# Patient Record
Sex: MALE | URBAN - METROPOLITAN AREA
[De-identification: names, ages, dates, MRNs, and addresses within clinical notes are randomized per-mention and may not be internally consistent; named-entity substitution may affect disease eponyms.]

---

## 2023-08-03 PROBLEM — N20.0 KIDNEY STONES: Status: ACTIVE | Noted: 2023-08-03

## 2023-08-03 PROBLEM — Z87.81: Status: ACTIVE | Noted: 2023-08-03

## 2023-08-03 PROBLEM — R19.7 DIARRHEA OF PRESUMED INFECTIOUS ORIGIN: Status: ACTIVE | Noted: 2023-08-03

## 2023-08-03 PROBLEM — R53.81 PHYSICAL DEBILITY: Status: ACTIVE | Noted: 2023-08-03

## 2023-08-03 PROBLEM — R63.0 ANOREXIA: Status: ACTIVE | Noted: 2023-08-03

## 2023-08-03 PROBLEM — F03.90 DEMENTIA WITHOUT BEHAVIORAL DISTURBANCE (HCC): Status: ACTIVE | Noted: 2023-08-03

## 2023-08-03 PROBLEM — R33.9 URINARY RETENTION: Status: ACTIVE | Noted: 2023-08-03

## 2023-08-11 ENCOUNTER — HOSPITAL ENCOUNTER (OUTPATIENT)
Facility: MEDICAL CENTER | Age: 88
End: 2023-08-11
Attending: NURSE PRACTITIONER
Payer: MEDICARE

## 2023-08-11 LAB
G LAMBLIA+C PARVUM AG STL QL RAPID: NORMAL
SIGNIFICANT IND 70042: NORMAL
SITE SITE: NORMAL
SOURCE SOURCE: NORMAL

## 2023-08-11 PROCEDURE — 87328 CRYPTOSPORIDIUM AG IA: CPT

## 2023-09-17 ENCOUNTER — HOSPITAL ENCOUNTER (OUTPATIENT)
Facility: MEDICAL CENTER | Age: 88
End: 2023-09-17
Attending: INTERNAL MEDICINE
Payer: COMMERCIAL

## 2023-09-19 ENCOUNTER — HOSPITAL ENCOUNTER (INPATIENT)
Facility: MEDICAL CENTER | Age: 88
LOS: 8 days | DRG: 871 | End: 2023-09-27
Attending: EMERGENCY MEDICINE | Admitting: INTERNAL MEDICINE
Payer: MEDICARE

## 2023-09-19 ENCOUNTER — APPOINTMENT (OUTPATIENT)
Dept: RADIOLOGY | Facility: MEDICAL CENTER | Age: 88
DRG: 871 | End: 2023-09-19
Attending: EMERGENCY MEDICINE
Payer: MEDICARE

## 2023-09-19 DIAGNOSIS — A41.9 SEPSIS, DUE TO UNSPECIFIED ORGANISM, UNSPECIFIED WHETHER ACUTE ORGAN DYSFUNCTION PRESENT (HCC): ICD-10-CM

## 2023-09-19 DIAGNOSIS — N17.9 SEPSIS WITH ACUTE RENAL FAILURE, DUE TO UNSPECIFIED ORGANISM, UNSPECIFIED ACUTE RENAL FAILURE TYPE, UNSPECIFIED WHETHER SEPTIC SHOCK PRESENT (HCC): ICD-10-CM

## 2023-09-19 DIAGNOSIS — A41.9 SEPSIS WITH ACUTE RENAL FAILURE, DUE TO UNSPECIFIED ORGANISM, UNSPECIFIED ACUTE RENAL FAILURE TYPE, UNSPECIFIED WHETHER SEPTIC SHOCK PRESENT (HCC): ICD-10-CM

## 2023-09-19 DIAGNOSIS — F03.90 DEMENTIA, UNSPECIFIED DEMENTIA SEVERITY, UNSPECIFIED DEMENTIA TYPE, UNSPECIFIED WHETHER BEHAVIORAL, PSYCHOTIC, OR MOOD DISTURBANCE OR ANXIETY (HCC): ICD-10-CM

## 2023-09-19 DIAGNOSIS — N39.0 URINARY TRACT INFECTION WITHOUT HEMATURIA, SITE UNSPECIFIED: ICD-10-CM

## 2023-09-19 DIAGNOSIS — R65.20 SEPSIS WITH ACUTE RENAL FAILURE, DUE TO UNSPECIFIED ORGANISM, UNSPECIFIED ACUTE RENAL FAILURE TYPE, UNSPECIFIED WHETHER SEPTIC SHOCK PRESENT (HCC): ICD-10-CM

## 2023-09-19 DIAGNOSIS — I95.89 OTHER SPECIFIED HYPOTENSION: ICD-10-CM

## 2023-09-19 PROBLEM — N28.9 RENAL INSUFFICIENCY: Status: ACTIVE | Noted: 2023-09-19

## 2023-09-19 PROBLEM — B96.89 UTI DUE TO KLEBSIELLA SPECIES: Status: ACTIVE | Noted: 2023-09-19

## 2023-09-19 PROBLEM — D69.6 THROMBOCYTOPENIA (HCC): Status: ACTIVE | Noted: 2023-09-19

## 2023-09-19 PROBLEM — D64.9 ANEMIA: Status: ACTIVE | Noted: 2023-09-19

## 2023-09-19 LAB
ALBUMIN SERPL BCP-MCNC: 2.1 G/DL (ref 3.2–4.9)
ALBUMIN/GLOB SERPL: 0.8 G/DL
ALP SERPL-CCNC: 147 U/L (ref 30–99)
ALT SERPL-CCNC: 13 U/L (ref 2–50)
AMORPH CRY #/AREA URNS HPF: PRESENT /HPF
ANION GAP SERPL CALC-SCNC: 13 MMOL/L (ref 7–16)
APPEARANCE UR: ABNORMAL
AST SERPL-CCNC: 22 U/L (ref 12–45)
BACTERIA #/AREA URNS HPF: ABNORMAL /HPF
BASOPHILS # BLD AUTO: 0 % (ref 0–1.8)
BASOPHILS # BLD: 0 K/UL (ref 0–0.12)
BILIRUB SERPL-MCNC: 0.7 MG/DL (ref 0.1–1.5)
BILIRUB UR QL STRIP.AUTO: NEGATIVE
BUN SERPL-MCNC: 40 MG/DL (ref 8–22)
BURR CELLS BLD QL SMEAR: NORMAL
CALCIUM ALBUM COR SERPL-MCNC: 8.9 MG/DL (ref 8.5–10.5)
CALCIUM SERPL-MCNC: 7.4 MG/DL (ref 8.5–10.5)
CHLORIDE SERPL-SCNC: 108 MMOL/L (ref 96–112)
CO2 SERPL-SCNC: 17 MMOL/L (ref 20–33)
COLOR UR: YELLOW
CORTIS SERPL-MCNC: 22 UG/DL (ref 0–23)
CREAT SERPL-MCNC: 1.74 MG/DL (ref 0.5–1.4)
EKG IMPRESSION: NORMAL
EOSINOPHIL # BLD AUTO: 0 K/UL (ref 0–0.51)
EOSINOPHIL NFR BLD: 0 % (ref 0–6.9)
EPI CELLS #/AREA URNS HPF: NEGATIVE /HPF
ERYTHROCYTE [DISTWIDTH] IN BLOOD BY AUTOMATED COUNT: 63.7 FL (ref 35.9–50)
GFR SERPLBLD CREATININE-BSD FMLA CKD-EPI: 37 ML/MIN/1.73 M 2
GLOBULIN SER CALC-MCNC: 2.5 G/DL (ref 1.9–3.5)
GLUCOSE SERPL-MCNC: 98 MG/DL (ref 65–99)
GLUCOSE UR STRIP.AUTO-MCNC: NEGATIVE MG/DL
HCT VFR BLD AUTO: 29.3 % (ref 42–52)
HGB BLD-MCNC: 9.3 G/DL (ref 14–18)
HYALINE CASTS #/AREA URNS LPF: ABNORMAL /LPF
INR PPP: 1.46 (ref 0.87–1.13)
KETONES UR STRIP.AUTO-MCNC: NEGATIVE MG/DL
LACTATE SERPL-SCNC: 1.6 MMOL/L (ref 0.5–2)
LEUKOCYTE ESTERASE UR QL STRIP.AUTO: ABNORMAL
LYMPHOCYTES # BLD AUTO: 0.15 K/UL (ref 1–4.8)
LYMPHOCYTES NFR BLD: 0.9 % (ref 22–41)
MANUAL DIFF BLD: NORMAL
MCH RBC QN AUTO: 29 PG (ref 27–33)
MCHC RBC AUTO-ENTMCNC: 31.7 G/DL (ref 32.3–36.5)
MCV RBC AUTO: 91.3 FL (ref 81.4–97.8)
METAMYELOCYTES NFR BLD MANUAL: 0.8 %
MICRO URNS: ABNORMAL
MONOCYTES # BLD AUTO: 0.28 K/UL (ref 0–0.85)
MONOCYTES NFR BLD AUTO: 1.7 % (ref 0–13.4)
MORPHOLOGY BLD-IMP: NORMAL
MUCOUS THREADS #/AREA URNS HPF: ABNORMAL /HPF
NEUTROPHILS # BLD AUTO: 15.94 K/UL (ref 1.82–7.42)
NEUTROPHILS NFR BLD: 95.8 % (ref 44–72)
NEUTS BAND NFR BLD MANUAL: 0.8 % (ref 0–10)
NITRITE UR QL STRIP.AUTO: POSITIVE
NRBC # BLD AUTO: 0 K/UL
NRBC BLD-RTO: 0 /100 WBC (ref 0–0.2)
OVALOCYTES BLD QL SMEAR: NORMAL
PH UR STRIP.AUTO: 6 [PH] (ref 5–8)
PLATELET # BLD AUTO: 114 K/UL (ref 164–446)
PLATELET BLD QL SMEAR: NORMAL
PMV BLD AUTO: 11.4 FL (ref 9–12.9)
POIKILOCYTOSIS BLD QL SMEAR: NORMAL
POTASSIUM SERPL-SCNC: 3.6 MMOL/L (ref 3.6–5.5)
PROT SERPL-MCNC: 4.6 G/DL (ref 6–8.2)
PROT UR QL STRIP: 100 MG/DL
PROTHROMBIN TIME: 17.9 SEC (ref 12–14.6)
RBC # BLD AUTO: 3.21 M/UL (ref 4.7–6.1)
RBC # URNS HPF: ABNORMAL /HPF
RBC BLD AUTO: PRESENT
RBC UR QL AUTO: ABNORMAL
RENAL EPI CELLS #/AREA URNS HPF: ABNORMAL /HPF
SODIUM SERPL-SCNC: 138 MMOL/L (ref 135–145)
SP GR UR STRIP.AUTO: 1.01
TRANS CELLS #/AREA URNS HPF: ABNORMAL /HPF
UROBILINOGEN UR STRIP.AUTO-MCNC: NORMAL MG/DL
WBC # BLD AUTO: 16.5 K/UL (ref 4.8–10.8)
WBC #/AREA URNS HPF: ABNORMAL /HPF

## 2023-09-19 PROCEDURE — 96374 THER/PROPH/DIAG INJ IV PUSH: CPT

## 2023-09-19 PROCEDURE — 87015 SPECIMEN INFECT AGNT CONCNTJ: CPT

## 2023-09-19 PROCEDURE — 87077 CULTURE AEROBIC IDENTIFY: CPT | Mod: 91

## 2023-09-19 PROCEDURE — 36415 COLL VENOUS BLD VENIPUNCTURE: CPT

## 2023-09-19 PROCEDURE — 71045 X-RAY EXAM CHEST 1 VIEW: CPT

## 2023-09-19 PROCEDURE — 303105 HCHG CATHETER EXTRA

## 2023-09-19 PROCEDURE — 81001 URINALYSIS AUTO W/SCOPE: CPT

## 2023-09-19 PROCEDURE — 99285 EMERGENCY DEPT VISIT HI MDM: CPT

## 2023-09-19 PROCEDURE — 87040 BLOOD CULTURE FOR BACTERIA: CPT

## 2023-09-19 PROCEDURE — 83605 ASSAY OF LACTIC ACID: CPT

## 2023-09-19 PROCEDURE — 700105 HCHG RX REV CODE 258: Performed by: EMERGENCY MEDICINE

## 2023-09-19 PROCEDURE — 85007 BL SMEAR W/DIFF WBC COUNT: CPT

## 2023-09-19 PROCEDURE — 85025 COMPLETE CBC W/AUTO DIFF WBC: CPT

## 2023-09-19 PROCEDURE — 51702 INSERT TEMP BLADDER CATH: CPT

## 2023-09-19 PROCEDURE — 770020 HCHG ROOM/CARE - TELE (206)

## 2023-09-19 PROCEDURE — 74018 RADEX ABDOMEN 1 VIEW: CPT

## 2023-09-19 PROCEDURE — 87106 FUNGI IDENTIFICATION YEAST: CPT

## 2023-09-19 PROCEDURE — 87186 SC STD MICRODIL/AGAR DIL: CPT | Mod: 91

## 2023-09-19 PROCEDURE — 99222 1ST HOSP IP/OBS MODERATE 55: CPT | Mod: AI | Performed by: HOSPITALIST

## 2023-09-19 PROCEDURE — 87086 URINE CULTURE/COLONY COUNT: CPT

## 2023-09-19 PROCEDURE — 85610 PROTHROMBIN TIME: CPT

## 2023-09-19 PROCEDURE — 700105 HCHG RX REV CODE 258: Performed by: HOSPITALIST

## 2023-09-19 PROCEDURE — 93005 ELECTROCARDIOGRAM TRACING: CPT

## 2023-09-19 PROCEDURE — 700111 HCHG RX REV CODE 636 W/ 250 OVERRIDE (IP): Mod: JZ | Performed by: EMERGENCY MEDICINE

## 2023-09-19 PROCEDURE — 82533 TOTAL CORTISOL: CPT

## 2023-09-19 PROCEDURE — 80053 COMPREHEN METABOLIC PANEL: CPT

## 2023-09-19 RX ORDER — MEMANTINE HYDROCHLORIDE 10 MG/1
5 TABLET ORAL DAILY
Status: DISCONTINUED | OUTPATIENT
Start: 2023-09-20 | End: 2023-09-27 | Stop reason: HOSPADM

## 2023-09-19 RX ORDER — AMOXICILLIN 250 MG
2 CAPSULE ORAL 2 TIMES DAILY
Status: DISCONTINUED | OUTPATIENT
Start: 2023-09-19 | End: 2023-09-27 | Stop reason: HOSPADM

## 2023-09-19 RX ORDER — OMEPRAZOLE 20 MG/1
20 CAPSULE, DELAYED RELEASE ORAL DAILY
Status: DISCONTINUED | OUTPATIENT
Start: 2023-09-20 | End: 2023-09-27 | Stop reason: HOSPADM

## 2023-09-19 RX ORDER — ONDANSETRON 4 MG/1
4 TABLET, ORALLY DISINTEGRATING ORAL EVERY 4 HOURS PRN
Status: DISCONTINUED | OUTPATIENT
Start: 2023-09-19 | End: 2023-09-27 | Stop reason: HOSPADM

## 2023-09-19 RX ORDER — OXYBUTYNIN CHLORIDE 5 MG/1
5 TABLET, EXTENDED RELEASE ORAL DAILY
COMMUNITY

## 2023-09-19 RX ORDER — LABETALOL HYDROCHLORIDE 5 MG/ML
10 INJECTION, SOLUTION INTRAVENOUS EVERY 4 HOURS PRN
Status: DISCONTINUED | OUTPATIENT
Start: 2023-09-19 | End: 2023-09-27 | Stop reason: HOSPADM

## 2023-09-19 RX ORDER — TAMSULOSIN HYDROCHLORIDE 0.4 MG/1
0.4 CAPSULE ORAL EVERY EVENING
Status: DISCONTINUED | OUTPATIENT
Start: 2023-09-20 | End: 2023-09-27 | Stop reason: HOSPADM

## 2023-09-19 RX ORDER — SODIUM CHLORIDE 9 MG/ML
INJECTION, SOLUTION INTRAVENOUS CONTINUOUS
Status: ON HOLD | COMMUNITY
Start: 2023-09-17 | End: 2023-09-27

## 2023-09-19 RX ORDER — CEFTRIAXONE 2 G/1
2000 INJECTION, POWDER, FOR SOLUTION INTRAMUSCULAR; INTRAVENOUS ONCE
Status: DISCONTINUED | OUTPATIENT
Start: 2023-09-19 | End: 2023-09-19

## 2023-09-19 RX ORDER — TERAZOSIN 5 MG/1
5 CAPSULE ORAL NIGHTLY
COMMUNITY
End: 2023-10-17

## 2023-09-19 RX ORDER — BISACODYL 10 MG
10 SUPPOSITORY, RECTAL RECTAL
Status: DISCONTINUED | OUTPATIENT
Start: 2023-09-19 | End: 2023-09-27 | Stop reason: HOSPADM

## 2023-09-19 RX ORDER — GUAIFENESIN 200 MG/10ML
10 LIQUID ORAL EVERY 4 HOURS PRN
COMMUNITY
End: 2023-10-17

## 2023-09-19 RX ORDER — SODIUM CHLORIDE, SODIUM LACTATE, POTASSIUM CHLORIDE, CALCIUM CHLORIDE 600; 310; 30; 20 MG/100ML; MG/100ML; MG/100ML; MG/100ML
1000 INJECTION, SOLUTION INTRAVENOUS ONCE
Status: COMPLETED | OUTPATIENT
Start: 2023-09-19 | End: 2023-09-19

## 2023-09-19 RX ORDER — LANOLIN ALCOHOL/MO/W.PET/CERES
3 CREAM (GRAM) TOPICAL
COMMUNITY

## 2023-09-19 RX ORDER — SODIUM CHLORIDE, SODIUM LACTATE, POTASSIUM CHLORIDE, AND CALCIUM CHLORIDE .6; .31; .03; .02 G/100ML; G/100ML; G/100ML; G/100ML
500 INJECTION, SOLUTION INTRAVENOUS
Status: COMPLETED | OUTPATIENT
Start: 2023-09-19 | End: 2023-09-19

## 2023-09-19 RX ORDER — TERAZOSIN 5 MG/1
5 CAPSULE ORAL DAILY
Status: ON HOLD | COMMUNITY
End: 2023-09-27

## 2023-09-19 RX ORDER — ONDANSETRON 2 MG/ML
4 INJECTION INTRAMUSCULAR; INTRAVENOUS EVERY 4 HOURS PRN
Status: DISCONTINUED | OUTPATIENT
Start: 2023-09-19 | End: 2023-09-27 | Stop reason: HOSPADM

## 2023-09-19 RX ORDER — POLYETHYLENE GLYCOL 3350 17 G/17G
1 POWDER, FOR SOLUTION ORAL
Status: DISCONTINUED | OUTPATIENT
Start: 2023-09-19 | End: 2023-09-27 | Stop reason: HOSPADM

## 2023-09-19 RX ORDER — OMEPRAZOLE 20 MG/1
20 CAPSULE, DELAYED RELEASE ORAL DAILY
COMMUNITY

## 2023-09-19 RX ORDER — MEMANTINE HYDROCHLORIDE 5 MG/1
5 TABLET ORAL DAILY
COMMUNITY

## 2023-09-19 RX ORDER — SODIUM CHLORIDE, SODIUM LACTATE, POTASSIUM CHLORIDE, CALCIUM CHLORIDE 600; 310; 30; 20 MG/100ML; MG/100ML; MG/100ML; MG/100ML
INJECTION, SOLUTION INTRAVENOUS CONTINUOUS
Status: DISCONTINUED | OUTPATIENT
Start: 2023-09-19 | End: 2023-09-20

## 2023-09-19 RX ORDER — AMOXICILLIN 250 MG
1 CAPSULE ORAL 2 TIMES DAILY
COMMUNITY

## 2023-09-19 RX ORDER — TAMSULOSIN HYDROCHLORIDE 0.4 MG/1
0.4 CAPSULE ORAL EVERY EVENING
COMMUNITY

## 2023-09-19 RX ORDER — POTASSIUM CHLORIDE 750 MG/1
10 TABLET, EXTENDED RELEASE ORAL DAILY
COMMUNITY

## 2023-09-19 RX ORDER — CEFTRIAXONE 2 G/1
2000 INJECTION, POWDER, FOR SOLUTION INTRAMUSCULAR; INTRAVENOUS ONCE
Status: COMPLETED | OUTPATIENT
Start: 2023-09-19 | End: 2023-09-19

## 2023-09-19 RX ORDER — OXYBUTYNIN CHLORIDE 5 MG/1
5 TABLET, EXTENDED RELEASE ORAL DAILY
Status: DISCONTINUED | OUTPATIENT
Start: 2023-09-20 | End: 2023-09-27 | Stop reason: HOSPADM

## 2023-09-19 RX ORDER — ENOXAPARIN SODIUM 100 MG/ML
40 INJECTION SUBCUTANEOUS DAILY
Status: DISCONTINUED | OUTPATIENT
Start: 2023-09-20 | End: 2023-09-20

## 2023-09-19 RX ADMIN — SODIUM CHLORIDE, POTASSIUM CHLORIDE, SODIUM LACTATE AND CALCIUM CHLORIDE 1000 ML: 600; 310; 30; 20 INJECTION, SOLUTION INTRAVENOUS at 11:47

## 2023-09-19 RX ADMIN — SODIUM CHLORIDE, POTASSIUM CHLORIDE, SODIUM LACTATE AND CALCIUM CHLORIDE 500 ML: 600; 310; 30; 20 INJECTION, SOLUTION INTRAVENOUS at 19:11

## 2023-09-19 RX ADMIN — SODIUM CHLORIDE, POTASSIUM CHLORIDE, SODIUM LACTATE AND CALCIUM CHLORIDE: 600; 310; 30; 20 INJECTION, SOLUTION INTRAVENOUS at 22:49

## 2023-09-19 RX ADMIN — CEFTRIAXONE SODIUM 2000 MG: 2 INJECTION, POWDER, FOR SOLUTION INTRAMUSCULAR; INTRAVENOUS at 12:49

## 2023-09-19 ASSESSMENT — ENCOUNTER SYMPTOMS
FEVER: 0
SHORTNESS OF BREATH: 0
ABDOMINAL PAIN: 0
DIZZINESS: 0
NAUSEA: 0
NERVOUS/ANXIOUS: 0
BACK PAIN: 0

## 2023-09-19 NOTE — ED PROVIDER NOTES
"CHIEF COMPLAINT  Chief Complaint   Patient presents with    Hypotension     Pt BIBA from [facility]. Pt noted to be hypotensive, systolic in the 80s.  Pt received 2L NS from [facility] facility and sent here. Pt denies dizziness at this time.     Abnormal Labs     Pt has blood work performed at [facility] last night and results came back with potassium of 2.8 and WBC count of 17.  Pt received 60mcg potassium replacement at the [facility].        LIMITATION TO HISTORY   Select: The patient has a history of dementia    HPI    Trev Carl is a 88 y.o. male with a history of dementia who presents to the Emergency Department for hypotension and abnormal labs onset prior to arrival. The patient states \"all of a sudden I was on the floor\". He reports he does not know where he was when this happened. He states he knows he is in the ED, but thinks he is in [facility] and does not know what city he is in. The patient states he has pain in his little finger, but denies any other pain or shortness of breath. Per nursing, the patient was noted to have a systolic blood pressure in the 80's at an outlying facility. He was given 2L NS from [facility] facility and was sent here. He also had blood work performed last night and the results came back with potassium of 2.8 and WBC of 17. He received 60mcg potassium replacement at the [facility].    OUTSIDE HISTORIAN(S):  Select: Nursing staff    EXTERNAL RECORDS REVIEWED  Select: Other   Urinary retention  The patient was admitted to the hospital  on 7- with obstructive acute renal failure with ureteral lithiasis and evidence of urinary tract  infection. He underwent urology consultation and subsequently double-J stenting on July 28. The bladder was noted to have  significant trabeculation and oxybutynin was initiated. Urology has requested the [facility] to remain in place until outpatient followup  with voiding trial in approximately 1 week.  Status post fracture of hip  Status post discharge " from skilled nursing facility.  Has home health ordered.  Denies pain on exam.     Physical debility  Status post recent discharge from skilled nursing facility.  Has home health.     Kidney stones  The patient was admitted to the hospital  on 7- with obstructive acute renal failure with ureteral lithiasis and evidence of urinary tract  infection. He underwent urology consultation and subsequently double-J stenting on July 28. The bladder was noted to have  significant trabeculation and oxybutynin was initiated. Urology has requested the [facility] to remain in place until outpatient followup  with voiding trial in approximately 1 week.  Diarrhea of presumed infectious origin  Patient is having constant liquid stools.  No foul odor reported.  To have a ER assess.     Dementia without behavioral disturbance (HCC)  Patient is oriented to person and situation.  Pleasant and cooperative.  Staff reports patient has been pleasant and cooperative.  No problems with mood or behavior.  Continue 24/7 care and group home setting with assist with all ADLs and medication administration.  Reassess as needed for changes in status      PAST MEDICAL HISTORY  Past Medical History:   Diagnosis Date    Hearing loss      .    SURGICAL HISTORY  History reviewed. No pertinent surgical history.  Patient recently had bilateral ureteral stents placed because of obstructive kidney stones and urosepsis    FAMILY HISTORY  No family history noted.   Unable to provide much of a history for this.    SOCIAL HISTORY  Social History     Socioeconomic History    Marital status:      Spouse name: Not on file    Number of children: Not on file    Years of education: Not on file    Highest education level: Not on file   Occupational History    Not on file   Tobacco Use    Smoking status: Not on file    Smokeless tobacco: Not on file   Substance and Sexual Activity    Alcohol use: Not on file    Drug use: Not on file    Sexual activity: Not on  "file   Other Topics Concern    Not on file   Social History Narrative    Not on file     Social Determinants of Health     Financial Resource Strain: Not on file   Food Insecurity: Not on file   Transportation Needs: Not on file   Physical Activity: Not on file   Stress: Not on file   Social Connections: Not on file   Intimate Partner Violence: Not on file   Housing Stability: Not on file         CURRENT MEDICATIONS  No current facility-administered medications on file prior to encounter.     Current Outpatient Medications on File Prior to Encounter   Medication Sig Dispense Refill    melatonin 3 MG Tab Take 3 mg by mouth at bedtime.      memantine (NAMENDA) 5 MG Tab Take 5 mg by mouth every day.      omeprazole (PRILOSEC) 20 MG delayed-release capsule Take 20 mg by mouth every day.      oxybutynin SR (DITROPAN-XL) 5 MG TABLET SR 24 HR Take 5 mg by mouth every day.      potassium chloride SA (KDUR) 20 MEQ Tab CR Take 60 mEq by mouth every day. 20 meq x 3 tablets = 60 meq      tamsulosin (FLOMAX) 0.4 MG capsule Take 0.4 mg by mouth every evening.      terazosin (HYTRIN) 5 MG Cap Take 5 mg by mouth every evening.      terazosin (HYTRIN) 5 MG Cap Take 5 mg by mouth every day.      senna-docusate (PERICOLACE OR SENOKOT S) 8.6-50 MG Tab Take 1 Tablet by mouth 2 times a day.      Sodium Chloride (NS) Solution Infuse  into a venous catheter continuous.      nystatin (MYCOSTATIN) 469195 UNIT/ML Suspension Take 500,000 Units by mouth 4 times a day.      guaiFENesin (ROBITUSSIN) 100 MG/5ML liquid Take 10 mL by mouth every four hours as needed for Cough.             ALLERGIES  No Known Allergies    PHYSICAL EXAM  VITAL SIGNS:BP 92/53   Pulse 87   Temp 35.9 °C (96.6 °F) (Temporal)   Resp 20   Ht 1.778 m (5' 10\")   Wt 90.7 kg (200 lb)   SpO2 96%   BMI 28.70 kg/m²     Constitutional: Elderly in appearance, confused, no acute ditress  HENT: Normocephalic, Atraumatic, Bilateral external ears normal. Dry mucus " membranes  Eyes:  conjunctiva are normal.   Neck: Supple.  Nontender midline  Cardiovascular: Regular rate and rhythm without murmurs gallops or rubs.   Thorax & Lungs: No respiratory distress. Breathing comfortably. Lungs are clear to auscultation bilaterally, there are no wheezes no rales. Chest wall is nontender.  Abdomen: Soft, non distended, non tender, chronic graham catheter in place  Skin: Warm, Dry, No erythema,   Back: No tenderness, No CVA tenderness.  Musculoskeletal: No clubbing or cyanosis, good range of motion, 2-3+ pitting edema in lower extremities, generalized weakness throughout  Neurologic: Alert & oriented x 1, normal sensation moving all extremities appears normal   Psychiatric: Pleasant     DIAGNOSTIC STUDIES / PROCEDURES  EKG  I have independently interpreted this EKG  Interpreted below by myself     LABS  Results for orders placed or performed during the hospital encounter of 09/19/23   LACTIC ACID   Result Value Ref Range    Lactic Acid 1.6 0.5 - 2.0 mmol/L   CBC WITH DIFFERENTIAL   Result Value Ref Range    WBC 16.5 (H) 4.8 - 10.8 K/uL    RBC 3.21 (L) 4.70 - 6.10 M/uL    Hemoglobin 9.3 (L) 14.0 - 18.0 g/dL    Hematocrit 29.3 (L) 42.0 - 52.0 %    MCV 91.3 81.4 - 97.8 fL    MCH 29.0 27.0 - 33.0 pg    MCHC 31.7 (L) 32.3 - 36.5 g/dL    RDW 63.7 (H) 35.9 - 50.0 fL    Platelet Count 114 (L) 164 - 446 K/uL    MPV 11.4 9.0 - 12.9 fL    Neutrophils-Polys 95.80 (H) 44.00 - 72.00 %    Lymphocytes 0.90 (L) 22.00 - 41.00 %    Monocytes 1.70 0.00 - 13.40 %    Eosinophils 0.00 0.00 - 6.90 %    Basophils 0.00 0.00 - 1.80 %    Nucleated RBC 0.00 0.00 - 0.20 /100 WBC    Neutrophils (Absolute) 15.94 (H) 1.82 - 7.42 K/uL    Lymphs (Absolute) 0.15 (L) 1.00 - 4.80 K/uL    Monos (Absolute) 0.28 0.00 - 0.85 K/uL    Eos (Absolute) 0.00 0.00 - 0.51 K/uL    Baso (Absolute) 0.00 0.00 - 0.12 K/uL    NRBC (Absolute) 0.00 K/uL   COMP METABOLIC PANEL   Result Value Ref Range    Sodium 138 135 - 145 mmol/L    Potassium 3.6  3.6 - 5.5 mmol/L    Chloride 108 96 - 112 mmol/L    Co2 17 (L) 20 - 33 mmol/L    Anion Gap 13.0 7.0 - 16.0    Glucose 98 65 - 99 mg/dL    Bun 40 (H) 8 - 22 mg/dL    Creatinine 1.74 (H) 0.50 - 1.40 mg/dL    Calcium 7.4 (L) 8.5 - 10.5 mg/dL    Correct Calcium 8.9 8.5 - 10.5 mg/dL    AST(SGOT) 22 12 - 45 U/L    ALT(SGPT) 13 2 - 50 U/L    Alkaline Phosphatase 147 (H) 30 - 99 U/L    Total Bilirubin 0.7 0.1 - 1.5 mg/dL    Albumin 2.1 (L) 3.2 - 4.9 g/dL    Total Protein 4.6 (L) 6.0 - 8.2 g/dL    Globulin 2.5 1.9 - 3.5 g/dL    A-G Ratio 0.8 g/dL   URINALYSIS    Specimen: Urine   Result Value Ref Range    Color Yellow     Character Turbid (A)     Specific Gravity 1.015 <1.035    Ph 6.0 5.0 - 8.0    Glucose Negative Negative mg/dL    Ketones Negative Negative mg/dL    Protein 100 (A) Negative mg/dL    Bilirubin Negative Negative    Urobilinogen, Urine Normal Negative    Nitrite Positive (A) Negative    Leukocyte Esterase Large (A) Negative    Occult Blood Moderate (A) Negative    Micro Urine Req Microscopic    URINE MICROSCOPIC (W/UA)   Result Value Ref Range    WBC 20-50 (A) /hpf    RBC 10-20 (A) /hpf    Bacteria Moderate (A) None /hpf    Epithelial Cells Negative /hpf    Epithelial Cells Renal Moderate (A) /hpf    Trans Epithelial Cells Few /hpf    Mucous Threads Few /hpf    Amorphous Crystal Present /hpf    Hyaline Cast 0-2 /lpf   DIFFERENTIAL MANUAL   Result Value Ref Range    Bands-Stabs 0.80 0.00 - 10.00 %    Metamyelocytes 0.80 %    Manual Diff Status PERFORMED    PERIPHERAL SMEAR REVIEW   Result Value Ref Range    Peripheral Smear Review see below    PLATELET ESTIMATE   Result Value Ref Range    Plt Estimation Decreased    MORPHOLOGY   Result Value Ref Range    RBC Morphology Present     Poikilocytosis 2+     Ovalocytes 1+     Echinocytes 2+    ESTIMATED GFR   Result Value Ref Range    GFR (CKD-EPI) 37 (A) >60 mL/min/1.73 m 2   Prothrombin time (INR)   Result Value Ref Range    PT 17.9 (H) 12.0 - 14.6 sec    INR 1.46  (H) 0.87 - 1.13   CORTISOL   Result Value Ref Range    Cortisol 22.0 0.0 - 23.0 ug/dL   EKG   Result Value Ref Range    Report       [facility] Emergency Dept.    Test Date:  2023  Pt Name:    TOOTIE DOWNING                   Department: ER  MRN:        [id number]                      Room:       [facility]  Gender:     Male                         Technician: [id number]  :        [id number]                   Requested By:RONNY VELASQUEZ  Order #:    [id number]                    Reading MD: RONNY VELASQUEZ MD    Measurements  Intervals                                Axis  Rate:       73                           P:          0  NH:         0                            QRS:        -23  QRSD:       93                           T:          36  QT:         416  QTc:        459    Interpretive Statements  Atrial fibrillation  Borderline left axis deviation  Low voltage, precordial leads  No previous ECG available for comparison  Electronically Signed On 2023 14:54:34 PDT by RONNY VELASQUEZ MD           RADIOLOGY  I have independently interpreted the diagnostic imaging associated with this visit and am waiting the final reading from the radiologist.   My preliminary interpretation is as follows: X-ray of the abdomen does show bilateral aorta stents in in place possible pneumonia on chest x-ray multi patchy areas      Radiologist interpretation:   [id number] VIEW   Final Result      1.  Bilateral ureteral stents are in place and appear appropriately positioned.   2.  Nonspecific bowel gas pattern.      DX-CHEST-PORTABLE (1 VIEW)   Final Result      Patchy bilateral airspace opacities suggestive of multifocal pneumonitis.           COURSE & MEDICAL DECISION MAKING    ED COURSE:    ED Observation Status? Yes;I am placing the patient in to an observation status due to a diagnostic uncertainty as well as therapeutic intensity. Patient placed in observation status at 10:50 AM 2023    Observation  plan is as follows: Will evaluate for sepsis until proven otherwise. Will administer antibiotics and fluids.    Severe dehydration dehydrated weak hypotensive, will give fluids and evaluate for sepsis until proven otherwise, antibiotics and fluids    INTERVENTIONS BY ME:  Medications   senna-docusate (Pericolace Or Senokot S) 8.6-50 MG per tablet 2 Tablet (has no administration in time range)     And   polyethylene glycol/lytes (Miralax) PACKET 1 Packet (has no administration in time range)     And   magnesium hydroxide (Milk Of Magnesia) suspension 30 mL (has no administration in time range)     And   bisacodyl (Dulcolax) suppository 10 mg (has no administration in time range)   Respiratory Therapy Consult (has no administration in time range)   lactated ringers infusion (BOLUS) (has no administration in time range)   lactated ringers infusion (has no administration in time range)   enoxaparin (Lovenox) inj 40 mg (has no administration in time range)   cefTRIAXone (Rocephin) syringe 2,000 mg (has no administration in time range)   labetalol (Normodyne/Trandate) injection 10 mg (has no administration in time range)   ondansetron (Zofran) syringe/vial injection 4 mg (has no administration in time range)   ondansetron (Zofran ODT) dispertab 4 mg (has no administration in time range)   cefTRIAXone (Rocephin) injection 2,000 mg (2,000 mg Intravenous Given 9/19/23 1249)   lactated ringers (LR) bolus (0 mL Intravenous Stopped 9/19/23 1437)       11:14 AM - Patient seen and examined at bedside. The patient is an 88 year old male who presents today for hypotension and abnormal labs. At outside facility, the patient's systolic blood pressure was in the 80's, his potassium was 2.8, and his WBC was 17. He has a history of dementia and is unable to give an accurate history. He appears dehydrated. Will evaluate for sepsis. Discussed plan of care, including obtaining lab work and imaging to monitor his symptoms. Patient agrees to  the plan of care. Ordered for DX-Chest, [id number] view, EKG, LA, CBC w/ diff, CMMP, UA, Urine culture, and Blood culture x2 to evaluate his symptoms.      Response on recheck: Patient is unchanged on recheck.      I have discussed management of the patient with the following physicians and sources:   Hospitalist for admission    Patient discharged from ED observation at 6:41 PM 09/19/23 will be escalated to admission.      INITIAL ASSESSMENT, COURSE AND PLAN  Care Narrative: Patient presents to the emergency department for evaluation.  The patient is altered but apparently that is his baseline.  He was hypotensive I did start the patient with 2 L bolus of lactated Ringer's.  Septic protocol was initiated and it does appear the patient has a significant white blood cell count of 17 here as well as what appears to be a urinary tract infection.  Of started the patient on empiric antibiotics of Rocephin.  At this point because of his hypotension and need for antibiotics we will admit the patient for overnight antibiotic treatment.  He does have stents in place which can certainly cause a pyuria.  The elevated white blood cell count as well as the hypotension is certainly a concern and we will admit the patient for further treatment and care.  Have spoken to the hospitalist for this admission.      HYDRATION: Based on the patient's presentation of hypotension and sepsis,  the patient was given IV fluids. IV Hydration was used because oral hydration is unable to be done due to the patient's symptoms. Upon recheck following hydration, the patient was improved.    CRITICAL CARE  The very real possibilty of a deterioration of this patient's condition required the highest level of my preparedness for sudden, emergent intervention.  I provided critical care services, which included medication orders, frequent reevaluations of the patient's condition and response to treatment, ordering and reviewing test results, and  discussing the case with various consultants.  The critical care time associated with the care of the patient was 33 minutes. Review chart for interventions. This time is exclusive of any other billable procedures.          ADDITIONAL PROBLEM LIST  1.  Dementia  2.  Bilateral ureteral stents  DISPOSITION AND DISCUSSIONS  Patient will be admitted for further treatment and care.    FINAL DIAGNOSIS  1. Urinary tract infection without hematuria, site unspecified    2. Sepsis, due to unspecified organism, unspecified whether acute organ dysfunction present (HCC)    3. Other specified hypotension    4. Dementia, unspecified dementia severity, unspecified dementia type, unspecified whether behavioral, psychotic, or mood disturbance or anxiety (HCC)       Jean MORENO (Scribe), am scribing for, and in the presence of, Jonny Spear M.D..    Electronically signed by: Jean Izquierdo (Scribe), 9/19/2023    Jonny MORENO M.D. personally performed the services described in this documentation, as scribed by Jean Izquierdo in my presence, and it is both accurate and complete.          Electronically signed by: Jonny Spear M.D.,6:41 PM 09/19/23

## 2023-09-19 NOTE — H&P
Hospital Medicine History & Physical Note    Date of Service  2023    Primary Care Physician  DELPHINE Leslie.      Code Status  Full Code    Chief Complaint  Chief Complaint   Patient presents with    Hypotension     Pt BIBA from [facility]. Pt noted to be hypotensive, systolic in the 80s.  Pt received 2L NS from outlying facility and sent here. Pt denies dizziness at this time.     Abnormal Labs     Pt has blood work performed at  last night and results came back with potassium of 2.8 and WBC count of 17.  Pt received 60mcg potassium replacement at the .        History of Presenting Illness  Trev Carl is a 88 y.o. male with cognitive decline who presented 2023 with a urinary tract infection and hypotension from [facility].  Reportedly he had recently been diagnosed with the UTI two days prior and was on oral antibiotics but despite this was found to be hypotensive at the [facility] care facility.  He was given 2L of NS and then sent to [facility] for further care.  The patient has poor recall of recent events but states he normally lives with his grandson but has been at the [facility] recently.    I discussed the plan of care with bedside RN.    Review of Systems  Review of Systems   Constitutional:  Negative for fever and malaise/fatigue.   Respiratory:  Negative for shortness of breath.    Gastrointestinal:  Negative for abdominal pain and nausea.   Genitourinary:  Positive for dysuria and frequency.   Musculoskeletal:  Negative for back pain.   Neurological:  Negative for dizziness.   Psychiatric/Behavioral:  The patient is not nervous/anxious.        Past Medical History   has a past medical history of Hearing loss.    Surgical History   has no past surgical history on file.     Family History  He states his father is alive at 105 yrs old (can't confirm)  Mother  of a stroke   Family history reviewed with patient. There is no family history that is pertinent to the  chief complaint.     Social History   Retired.  States he is .  Had children and has grandchild.  Nonsmoker. Occasional beer.    Allergies  No Known Allergies    Medications  Prior to Admission Medications   Prescriptions Last Dose Informant Patient Reported? Taking?   melatonin 3 MG Tab 9/18/2023 at 2100  Yes Yes   Sig: Take 3 mg by mouth at bedtime.   memantine (NAMENDA) 5 MG Tab 9/19/2023 at 0800  Yes Yes   Sig: Take 5 mg by mouth every day.   omeprazole (PRILOSEC) 20 MG delayed-release capsule 9/19/2023 at 0800  Yes Yes   Sig: Take 20 mg by mouth every day.      Facility-Administered Medications: None       Physical Exam  Temp:  [35.9 °C (96.6 °F)] 35.9 °C (96.6 °F)  Pulse:  [] 83  Resp:  [16-26] 22  BP: ()/(48-65) 86/48  SpO2:  [91 %-96 %] 91 %  Blood Pressure : 93/56   Temperature: 35.9 °C (96.6 °F)   Pulse: 100   Respiration: (!) 25   Pulse Oximetry: 93 %       Physical Exam  Vitals reviewed.   Constitutional:       Appearance: Normal appearance. He is not diaphoretic.   HENT:      Head: Normocephalic and atraumatic.      Nose: Nose normal.      Mouth/Throat:      Mouth: Mucous membranes are moist.      Pharynx: No oropharyngeal exudate.   Eyes:      General: No scleral icterus.        Right eye: No discharge.         Left eye: No discharge.      Extraocular Movements: Extraocular movements intact.      Conjunctiva/sclera: Conjunctivae normal.   Cardiovascular:      Rate and Rhythm: Normal rate and regular rhythm.      Pulses:           Radial pulses are 2+ on the right side and 2+ on the left side.        Dorsalis pedis pulses are 2+ on the right side and 2+ on the left side.      Heart sounds: No murmur heard.  Pulmonary:      Effort: Pulmonary effort is normal. No respiratory distress.      Breath sounds: Normal breath sounds. No wheezing or rales.   Abdominal:      General: Bowel sounds are normal. There is no distension.      Palpations: Abdomen is soft.   Musculoskeletal:          "General: No swelling or tenderness.      Cervical back: Neck supple. No muscular tenderness.      Right lower leg: No edema.      Left lower leg: No edema.   Lymphadenopathy:      Cervical: No cervical adenopathy.   Skin:     Coloration: Skin is not jaundiced or pale.   Neurological:      General: No focal deficit present.      Mental Status: He is alert. He is disoriented.      Cranial Nerves: No cranial nerve deficit.   Psychiatric:         Attention and Perception: Attention normal.         Mood and Affect: Mood normal.         Speech: Speech normal.         Behavior: Behavior normal.         Cognition and Memory: Cognition is impaired.         Laboratory:  Recent Labs     09/19/23  1150   WBC 16.5*   RBC 3.21*   HEMOGLOBIN 9.3*   HEMATOCRIT 29.3*   MCV 91.3   MCH 29.0   MCHC 31.7*   RDW 63.7*   PLATELETCT 114*   MPV 11.4     Recent Labs     09/19/23  1150   SODIUM 138   POTASSIUM 3.6   CHLORIDE 108   CO2 17*   GLUCOSE 98   BUN 40*   CREATININE 1.74*   CALCIUM 7.4*     Recent Labs     09/19/23  1150   ALTSGPT 13   ASTSGOT 22   ALKPHOSPHAT 147*   TBILIRUBIN 0.7   GLUCOSE 98     Recent Labs     09/19/23  1150   INR 1.46*     No results for input(s): \"NTPROBNP\" in the last 72 hours.      No results for input(s): \"TROPONINT\" in the last 72 hours.    Imaging:  [id number] VIEW   Final Result      1.  Bilateral ureteral stents are in place and appear appropriately positioned.   2.  Nonspecific bowel gas pattern.      DX-CHEST-PORTABLE (1 VIEW)   Final Result      Patchy bilateral airspace opacities suggestive of multifocal pneumonitis.            Assessment/Plan:  Justification for Admission Status  I anticipate this patient will require at least two midnights for appropriate medical management, necessitating inpatient admission because failure of outpatient antibiotic for UTI and hypotension requiring further evaluation and IV fluids.    Patient will need a Med/Surg bed on EMERGENCY service .  The need is secondary " to IV antibiotics and IV fluids for sepsis from UTI.    * Sepsis (HCC)  Assessment & Plan  This is Sepsis Present on admission  SIRS criteria identified on my evaluation include: Tachycardia, with heart rate greater than 90 BPM and Leukocytosis, with WBC greater than 12,000  Clinical indicators of end organ dysfunction include Hypotension with systolic blood pressure less than 90 or MAP less than 65  Source is urinary  Sepsis protocol initiated  Crystalloid Fluid Administration: Fluid resuscitation ordered per standard protocol - 30 mL/kg per current or ideal body weight  IV antibiotics as appropriate for source of sepsis  Reassessment: I have reassessed the patient's hemodynamic status    Thrombocytopenia (HCC)  Assessment & Plan  Plt:114  Monitor cbc    UTI due to Klebsiella species  Assessment & Plan  From urine culture from 9/17  Monitor culture for susceptibilities.  IV rocephin    Anemia  Assessment & Plan  Hgb:9.3 without sign of hemorrhage  Monitor CBC    Renal insufficiency  Assessment & Plan  9/19 BUN:40, Cr:1.74  IV fluids  Monitor I/O's  No prior labs here     Dementia without behavioral disturbance (HCC)- (present on admission)  Assessment & Plan  Unknown baseline mentation.  Will need family input        VTE prophylaxis: SCDs/TEDs

## 2023-09-19 NOTE — ED TRIAGE NOTES
"Chief Complaint   Patient presents with    Hypotension     Pt BIBA from [facility]. Pt noted to be hypotensive, systolic in the 80s.  Pt received 2L NS from outlying facility and sent here. Pt denies dizziness at this time.     Abnormal Labs     Pt has blood work performed at [facility] last night and results came back with potassium of 2.8 and WBC count of 17.  Pt received 60mcg potassium replacement at the [facility].      BP 92/53   Pulse 87   Temp 35.9 °C (96.6 °F) (Temporal)   Resp 20   Ht 1.778 m (5' 10\")   Wt 90.7 kg (200 lb)   SpO2 96%   BMI 28.70 kg/m²     Pt placed on the monitor. Pt denies any complaints at this time. Pt does have a graham catheter in place with cloudy drainage present. EKG ordered on arrival.   "

## 2023-09-19 NOTE — ED NOTES
Med rec updated and complete. Allergies reviewed.  Per MARS from [facility]. No antibiotics noted.    Home pharmacy  [facility] [phone]

## 2023-09-19 NOTE — ED NOTES
Bedside report with LUCY Barry.  Pt connected to the monitor.  Fluid bolus currently infusing.  Call light within reach.

## 2023-09-20 ENCOUNTER — APPOINTMENT (OUTPATIENT)
Dept: CARDIOLOGY | Facility: MEDICAL CENTER | Age: 88
DRG: 871 | End: 2023-09-20
Attending: INTERNAL MEDICINE
Payer: MEDICARE

## 2023-09-20 PROBLEM — N17.9 ACUTE KIDNEY INJURY (HCC): Status: ACTIVE | Noted: 2023-09-19

## 2023-09-20 PROBLEM — I48.91 ATRIAL FIBRILLATION (HCC): Status: ACTIVE | Noted: 2023-09-20

## 2023-09-20 PROBLEM — R78.81 BACTEREMIA: Status: ACTIVE | Noted: 2023-09-20

## 2023-09-20 PROBLEM — R65.21 SEPTIC SHOCK (HCC): Status: ACTIVE | Noted: 2023-09-19

## 2023-09-20 LAB
ALBUMIN SERPL BCP-MCNC: 2.3 G/DL (ref 3.2–4.9)
ALBUMIN/GLOB SERPL: 0.8 G/DL
ALP SERPL-CCNC: 183 U/L (ref 30–99)
ALT SERPL-CCNC: 19 U/L (ref 2–50)
ANION GAP SERPL CALC-SCNC: 13 MMOL/L (ref 7–16)
ANION GAP SERPL CALC-SCNC: 14 MMOL/L (ref 7–16)
AST SERPL-CCNC: 30 U/L (ref 12–45)
BACTERIA UR CULT: ABNORMAL
BACTERIA UR CULT: ABNORMAL
BASOPHILS # BLD AUTO: 0.6 % (ref 0–1.8)
BASOPHILS # BLD: 0.18 K/UL (ref 0–0.12)
BILIRUB SERPL-MCNC: 0.7 MG/DL (ref 0.1–1.5)
BUN SERPL-MCNC: 30 MG/DL (ref 8–22)
BUN SERPL-MCNC: 32 MG/DL (ref 8–22)
CALCIUM ALBUM COR SERPL-MCNC: 9.3 MG/DL (ref 8.5–10.5)
CALCIUM SERPL-MCNC: 7.9 MG/DL (ref 8.5–10.5)
CALCIUM SERPL-MCNC: 8.1 MG/DL (ref 8.5–10.5)
CHLORIDE SERPL-SCNC: 109 MMOL/L (ref 96–112)
CHLORIDE SERPL-SCNC: 110 MMOL/L (ref 96–112)
CO2 SERPL-SCNC: 16 MMOL/L (ref 20–33)
CO2 SERPL-SCNC: 18 MMOL/L (ref 20–33)
CREAT SERPL-MCNC: 1.51 MG/DL (ref 0.5–1.4)
CREAT SERPL-MCNC: 1.54 MG/DL (ref 0.5–1.4)
EKG IMPRESSION: NORMAL
EOSINOPHIL # BLD AUTO: 0.03 K/UL (ref 0–0.51)
EOSINOPHIL NFR BLD: 0.1 % (ref 0–6.9)
ERYTHROCYTE [DISTWIDTH] IN BLOOD BY AUTOMATED COUNT: 63.7 FL (ref 35.9–50)
ERYTHROCYTE [DISTWIDTH] IN BLOOD BY AUTOMATED COUNT: 64 FL (ref 35.9–50)
GFR SERPLBLD CREATININE-BSD FMLA CKD-EPI: 43 ML/MIN/1.73 M 2
GFR SERPLBLD CREATININE-BSD FMLA CKD-EPI: 44 ML/MIN/1.73 M 2
GLOBULIN SER CALC-MCNC: 2.9 G/DL (ref 1.9–3.5)
GLUCOSE BLD STRIP.AUTO-MCNC: 113 MG/DL (ref 65–99)
GLUCOSE BLD STRIP.AUTO-MCNC: 143 MG/DL (ref 65–99)
GLUCOSE BLD STRIP.AUTO-MCNC: 180 MG/DL (ref 65–99)
GLUCOSE BLD STRIP.AUTO-MCNC: 68 MG/DL (ref 65–99)
GLUCOSE SERPL-MCNC: 158 MG/DL (ref 65–99)
GLUCOSE SERPL-MCNC: 63 MG/DL (ref 65–99)
HCT VFR BLD AUTO: 33.5 % (ref 42–52)
HCT VFR BLD AUTO: 33.8 % (ref 42–52)
HGB BLD-MCNC: 10.5 G/DL (ref 14–18)
HGB BLD-MCNC: 10.9 G/DL (ref 14–18)
IMM GRANULOCYTES # BLD AUTO: 1 K/UL (ref 0–0.11)
IMM GRANULOCYTES NFR BLD AUTO: 3.4 % (ref 0–0.9)
LACTATE SERPL-SCNC: 2.8 MMOL/L (ref 0.5–2)
LV EJECT FRACT  99904: 60
LV EJECT FRACT MOD 2C 99903: 82.76
LV EJECT FRACT MOD 4C 99902: 59.3
LYMPHOCYTES # BLD AUTO: 1.7 K/UL (ref 1–4.8)
LYMPHOCYTES NFR BLD: 5.8 % (ref 22–41)
MCH RBC QN AUTO: 28.5 PG (ref 27–33)
MCH RBC QN AUTO: 29.1 PG (ref 27–33)
MCHC RBC AUTO-ENTMCNC: 31.3 G/DL (ref 32.3–36.5)
MCHC RBC AUTO-ENTMCNC: 32.2 G/DL (ref 32.3–36.5)
MCV RBC AUTO: 90.1 FL (ref 81.4–97.8)
MCV RBC AUTO: 90.8 FL (ref 81.4–97.8)
MONOCYTES # BLD AUTO: 0.64 K/UL (ref 0–0.85)
MONOCYTES NFR BLD AUTO: 2.2 % (ref 0–13.4)
NEUTROPHILS # BLD AUTO: 25.65 K/UL (ref 1.82–7.42)
NEUTROPHILS NFR BLD: 87.9 % (ref 44–72)
NRBC # BLD AUTO: 0 K/UL
NRBC BLD-RTO: 0 /100 WBC (ref 0–0.2)
PLATELET # BLD AUTO: 121 K/UL (ref 164–446)
PLATELET # BLD AUTO: 95 K/UL (ref 164–446)
PMV BLD AUTO: 10.7 FL (ref 9–12.9)
PMV BLD AUTO: 10.8 FL (ref 9–12.9)
POTASSIUM SERPL-SCNC: 3.9 MMOL/L (ref 3.6–5.5)
POTASSIUM SERPL-SCNC: 4 MMOL/L (ref 3.6–5.5)
PROT SERPL-MCNC: 5.2 G/DL (ref 6–8.2)
RBC # BLD AUTO: 3.69 M/UL (ref 4.7–6.1)
RBC # BLD AUTO: 3.75 M/UL (ref 4.7–6.1)
SIGNIFICANT IND 70042: ABNORMAL
SITE SITE: ABNORMAL
SODIUM SERPL-SCNC: 140 MMOL/L (ref 135–145)
SODIUM SERPL-SCNC: 140 MMOL/L (ref 135–145)
SOURCE SOURCE: ABNORMAL
TSH SERPL DL<=0.005 MIU/L-ACNC: 0.86 UIU/ML (ref 0.38–5.33)
WBC # BLD AUTO: 14.2 K/UL (ref 4.8–10.8)
WBC # BLD AUTO: 29.2 K/UL (ref 4.8–10.8)

## 2023-09-20 PROCEDURE — 700105 HCHG RX REV CODE 258

## 2023-09-20 PROCEDURE — 87077 CULTURE AEROBIC IDENTIFY: CPT

## 2023-09-20 PROCEDURE — 99233 SBSQ HOSP IP/OBS HIGH 50: CPT | Mod: GC | Performed by: INTERNAL MEDICINE

## 2023-09-20 PROCEDURE — 700105 HCHG RX REV CODE 258: Performed by: INTERNAL MEDICINE

## 2023-09-20 PROCEDURE — 700101 HCHG RX REV CODE 250: Performed by: INTERNAL MEDICINE

## 2023-09-20 PROCEDURE — 92610 EVALUATE SWALLOWING FUNCTION: CPT

## 2023-09-20 PROCEDURE — 85025 COMPLETE CBC W/AUTO DIFF WBC: CPT

## 2023-09-20 PROCEDURE — 700102 HCHG RX REV CODE 250 W/ 637 OVERRIDE(OP): Performed by: HOSPITALIST

## 2023-09-20 PROCEDURE — 84443 ASSAY THYROID STIM HORMONE: CPT

## 2023-09-20 PROCEDURE — 93306 TTE W/DOPPLER COMPLETE: CPT | Mod: 26 | Performed by: INTERNAL MEDICINE

## 2023-09-20 PROCEDURE — A9270 NON-COVERED ITEM OR SERVICE: HCPCS | Performed by: HOSPITALIST

## 2023-09-20 PROCEDURE — A9270 NON-COVERED ITEM OR SERVICE: HCPCS

## 2023-09-20 PROCEDURE — 93010 ELECTROCARDIOGRAM REPORT: CPT | Performed by: INTERNAL MEDICINE

## 2023-09-20 PROCEDURE — 80048 BASIC METABOLIC PNL TOTAL CA: CPT

## 2023-09-20 PROCEDURE — 700111 HCHG RX REV CODE 636 W/ 250 OVERRIDE (IP)

## 2023-09-20 PROCEDURE — 99291 CRITICAL CARE FIRST HOUR: CPT | Performed by: INTERNAL MEDICINE

## 2023-09-20 PROCEDURE — 700102 HCHG RX REV CODE 250 W/ 637 OVERRIDE(OP)

## 2023-09-20 PROCEDURE — 93005 ELECTROCARDIOGRAM TRACING: CPT | Performed by: INTERNAL MEDICINE

## 2023-09-20 PROCEDURE — 83605 ASSAY OF LACTIC ACID: CPT | Mod: 91

## 2023-09-20 PROCEDURE — 700101 HCHG RX REV CODE 250

## 2023-09-20 PROCEDURE — 80053 COMPREHEN METABOLIC PANEL: CPT

## 2023-09-20 PROCEDURE — 85027 COMPLETE CBC AUTOMATED: CPT

## 2023-09-20 PROCEDURE — 36415 COLL VENOUS BLD VENIPUNCTURE: CPT

## 2023-09-20 PROCEDURE — 93306 TTE W/DOPPLER COMPLETE: CPT

## 2023-09-20 PROCEDURE — 87040 BLOOD CULTURE FOR BACTERIA: CPT

## 2023-09-20 PROCEDURE — 82962 GLUCOSE BLOOD TEST: CPT | Mod: 91

## 2023-09-20 PROCEDURE — 770022 HCHG ROOM/CARE - ICU (200)

## 2023-09-20 RX ORDER — HEPARIN SODIUM 5000 [USP'U]/ML
5000 INJECTION, SOLUTION INTRAVENOUS; SUBCUTANEOUS EVERY 8 HOURS
Status: DISCONTINUED | OUTPATIENT
Start: 2023-09-20 | End: 2023-09-21

## 2023-09-20 RX ORDER — SODIUM CHLORIDE, SODIUM LACTATE, POTASSIUM CHLORIDE, AND CALCIUM CHLORIDE .6; .31; .03; .02 G/100ML; G/100ML; G/100ML; G/100ML
30 INJECTION, SOLUTION INTRAVENOUS ONCE
Status: COMPLETED | OUTPATIENT
Start: 2023-09-20 | End: 2023-09-20

## 2023-09-20 RX ORDER — NOREPINEPHRINE BITARTRATE 0.03 MG/ML
0-1 INJECTION, SOLUTION INTRAVENOUS CONTINUOUS
Status: DISCONTINUED | OUTPATIENT
Start: 2023-09-20 | End: 2023-09-21

## 2023-09-20 RX ORDER — SODIUM CHLORIDE 9 MG/ML
500 INJECTION, SOLUTION INTRAVENOUS ONCE
Status: COMPLETED | OUTPATIENT
Start: 2023-09-20 | End: 2023-09-20

## 2023-09-20 RX ORDER — SODIUM CHLORIDE, SODIUM LACTATE, POTASSIUM CHLORIDE, AND CALCIUM CHLORIDE .6; .31; .03; .02 G/100ML; G/100ML; G/100ML; G/100ML
1000 INJECTION, SOLUTION INTRAVENOUS ONCE
Status: COMPLETED | OUTPATIENT
Start: 2023-09-20 | End: 2023-09-20

## 2023-09-20 RX ORDER — NOREPINEPHRINE BITARTRATE 0.03 MG/ML
INJECTION, SOLUTION INTRAVENOUS
Status: ACTIVE
Start: 2023-09-20 | End: 2023-09-20

## 2023-09-20 RX ORDER — DEXTROSE MONOHYDRATE 25 G/50ML
25 INJECTION, SOLUTION INTRAVENOUS
Status: DISCONTINUED | OUTPATIENT
Start: 2023-09-20 | End: 2023-09-27 | Stop reason: HOSPADM

## 2023-09-20 RX ADMIN — SODIUM CHLORIDE, POTASSIUM CHLORIDE, SODIUM LACTATE AND CALCIUM CHLORIDE 1500 ML: 600; 310; 30; 20 INJECTION, SOLUTION INTRAVENOUS at 08:54

## 2023-09-20 RX ADMIN — NOREPINEPHRINE BITARTRATE 0.1 MCG/KG/MIN: 1 INJECTION INTRAVENOUS at 08:13

## 2023-09-20 RX ADMIN — MEROPENEM 500 MG: 500 INJECTION, POWDER, FOR SOLUTION INTRAVENOUS at 22:57

## 2023-09-20 RX ADMIN — SODIUM CHLORIDE, POTASSIUM CHLORIDE, SODIUM LACTATE AND CALCIUM CHLORIDE 1000 ML: 600; 310; 30; 20 INJECTION, SOLUTION INTRAVENOUS at 08:00

## 2023-09-20 RX ADMIN — DEXTROSE MONOHYDRATE 25 G: 25 INJECTION, SOLUTION INTRAVENOUS at 07:52

## 2023-09-20 RX ADMIN — SENNOSIDES AND DOCUSATE SODIUM 2 TABLET: 50; 8.6 TABLET ORAL at 17:27

## 2023-09-20 RX ADMIN — OXYBUTYNIN CHLORIDE 5 MG: 5 TABLET, EXTENDED RELEASE ORAL at 05:10

## 2023-09-20 RX ADMIN — MEROPENEM 500 MG: 500 INJECTION, POWDER, FOR SOLUTION INTRAVENOUS at 09:12

## 2023-09-20 RX ADMIN — SENNOSIDES AND DOCUSATE SODIUM 2 TABLET: 50; 8.6 TABLET ORAL at 05:10

## 2023-09-20 RX ADMIN — SODIUM CHLORIDE 500 ML: 9 INJECTION, SOLUTION INTRAVENOUS at 22:06

## 2023-09-20 RX ADMIN — OMEPRAZOLE 20 MG: 20 CAPSULE, DELAYED RELEASE ORAL at 05:10

## 2023-09-20 RX ADMIN — TAMSULOSIN HYDROCHLORIDE 0.4 MG: 0.4 CAPSULE ORAL at 17:27

## 2023-09-20 RX ADMIN — MEROPENEM 500 MG: 500 INJECTION, POWDER, FOR SOLUTION INTRAVENOUS at 13:29

## 2023-09-20 RX ADMIN — MEMANTINE HYDROCHLORIDE 5 MG: 10 TABLET ORAL at 05:10

## 2023-09-20 RX ADMIN — SODIUM CHLORIDE, POTASSIUM CHLORIDE, SODIUM LACTATE AND CALCIUM CHLORIDE: 600; 310; 30; 20 INJECTION, SOLUTION INTRAVENOUS at 07:39

## 2023-09-20 ASSESSMENT — PATIENT HEALTH QUESTIONNAIRE - PHQ9
1. LITTLE INTEREST OR PLEASURE IN DOING THINGS: NOT AT ALL
2. FEELING DOWN, DEPRESSED, IRRITABLE, OR HOPELESS: NOT AT ALL
SUM OF ALL RESPONSES TO PHQ9 QUESTIONS 1 AND 2: 0

## 2023-09-20 ASSESSMENT — COGNITIVE AND FUNCTIONAL STATUS - GENERAL
MOVING FROM LYING ON BACK TO SITTING ON SIDE OF FLAT BED: UNABLE
MOBILITY SCORE: 6
SUGGESTED CMS G CODE MODIFIER DAILY ACTIVITY: CN
CLIMB 3 TO 5 STEPS WITH RAILING: TOTAL
TOILETING: TOTAL
PERSONAL GROOMING: TOTAL
DRESSING REGULAR UPPER BODY CLOTHING: TOTAL
SUGGESTED CMS G CODE MODIFIER MOBILITY: CN
HELP NEEDED FOR BATHING: TOTAL
DAILY ACTIVITIY SCORE: 6
DRESSING REGULAR LOWER BODY CLOTHING: TOTAL
EATING MEALS: TOTAL
TURNING FROM BACK TO SIDE WHILE IN FLAT BAD: UNABLE
STANDING UP FROM CHAIR USING ARMS: TOTAL
MOVING TO AND FROM BED TO CHAIR: UNABLE
WALKING IN HOSPITAL ROOM: TOTAL

## 2023-09-20 ASSESSMENT — FIBROSIS 4 INDEX
FIB4 SCORE: 5.65
FIB4 SCORE: 5.65
FIB4 SCORE: 4.71
FIB4 SCORE: 4.71

## 2023-09-20 ASSESSMENT — ENCOUNTER SYMPTOMS
SHORTNESS OF BREATH: 1
HEMOPTYSIS: 0
DIZZINESS: 0
COUGH: 0
DIARRHEA: 0
VOMITING: 0
PALPITATIONS: 0
FEVER: 0
SPEECH CHANGE: 0
ABDOMINAL PAIN: 0
NAUSEA: 0
CHILLS: 0
ORTHOPNEA: 0
BLURRED VISION: 0
HEADACHES: 0
WEIGHT LOSS: 0
FOCAL WEAKNESS: 0
SPUTUM PRODUCTION: 0
WEAKNESS: 1
BLOOD IN STOOL: 0
DOUBLE VISION: 0

## 2023-09-20 ASSESSMENT — LIFESTYLE VARIABLES
EVER HAD A DRINK FIRST THING IN THE MORNING TO STEADY YOUR NERVES TO GET RID OF A HANGOVER: NO
CONSUMPTION TOTAL: INCOMPLETE
ALCOHOL_USE: NO
EVER FELT BAD OR GUILTY ABOUT YOUR DRINKING: NO
DOES PATIENT WANT TO STOP DRINKING: NO
HOW MANY TIMES IN THE PAST YEAR HAVE YOU HAD 5 OR MORE DRINKS IN A DAY: 0
TOTAL SCORE: 0
DOES PATIENT WANT TO STOP DRINKING: NO
AVERAGE NUMBER OF DAYS PER WEEK YOU HAVE A DRINK CONTAINING ALCOHOL: 0
ON A TYPICAL DAY WHEN YOU DRINK ALCOHOL HOW MANY DRINKS DO YOU HAVE: 0
TOTAL SCORE: 0
HAVE PEOPLE ANNOYED YOU BY CRITICIZING YOUR DRINKING: NO
CONSUMPTION TOTAL: NEGATIVE
AVERAGE NUMBER OF DAYS PER WEEK YOU HAVE A DRINK CONTAINING ALCOHOL: 0
HAVE YOU EVER FELT YOU SHOULD CUT DOWN ON YOUR DRINKING: NO
ON A TYPICAL DAY WHEN YOU DRINK ALCOHOL HOW MANY DRINKS DO YOU HAVE: 0
EVER HAD A DRINK FIRST THING IN THE MORNING TO STEADY YOUR NERVES TO GET RID OF A HANGOVER: NO
TOTAL SCORE: 0
HAVE PEOPLE ANNOYED YOU BY CRITICIZING YOUR DRINKING: NO
ALCOHOL_USE: NO
HOW MANY TIMES IN THE PAST YEAR HAVE YOU HAD 5 OR MORE DRINKS IN A DAY: 0
DOES PATIENT WANT TO TALK TO SOMEONE ABOUT QUITTING: NO
HAVE YOU EVER FELT YOU SHOULD CUT DOWN ON YOUR DRINKING: NO

## 2023-09-20 ASSESSMENT — PAIN DESCRIPTION - PAIN TYPE: TYPE: ACUTE PAIN

## 2023-09-20 NOTE — DIETARY
"Nutrition Services: Day 1 of admit.  Trev Carl is a 88 y.o. male with admitting DX of Sepsis (HCC).    RD alerted to pt with Nutrition Screening Score of 2:  Nutrition Screen   Have you recently lost weight without trying? Unsure       Have you been eating poorly because of a decreased appetite? No       SCORE 2 Abnormal        Does the patient currently have a > Stage 3 Pressure ulcer, wound dehiscence or significant burns? Does the patient have a history of pressure ulcers?  Yes         Assessment:  Height: 177.8 cm (5' 10\")  Weight: 81.9 kg (180 lb 8.9 oz)  Body mass index is 25.91 kg/m²., BMI classification: Normal-overweight.    Diet/Intake: Regular diet ordered this afternoon. No meals recorded yet in ADLs.    Evaluation/Plan:   Pt carries a Dx of Dementia without behavioral disturbance (HCC). Unable to obtain reliable weight Hx from pt. No recent admissions for review of weight trends.  Wound Team evaluation pending.  No RD interventions warranted @ this time. Will follow.  "

## 2023-09-20 NOTE — PROGRESS NOTES
Lab called to injury about lactic acid results.    Per  there needs to be an gray top sent.      Lab to come draw the lab.

## 2023-09-20 NOTE — PROGRESS NOTES
Handoff report received from ED shift nurse. Pt care assumed. Brought in by hospital bed. POC discussed with Pt and Pt verbalizes no questions at this time. Pt is AAOx2, on RA on Tele monitoring, and VSS. Call light and belongings within reach, bed in lowest and locked position, and Pt educated on use of call light. Will continue to monitor.

## 2023-09-20 NOTE — DOCUMENTATION QUERY
[facility]                                                                       Query Response Note      PATIENT:               TOOTIE DOWNING  ACCT #:                  [id number]  MRN:                     [id number]  :                      1935  ADMIT DATE:       2023 10:27 AM  DISCH DATE:          RESPONDING  PROVIDER #:        [id number]           QUERY TEXT:    Use of supplemental oxygen use is documented in the Medical Record.    The patient's Clinical Indicators include:  87yo with dx of gram negative sepsis, shock, gastroenteritis, UTI, dementia, anemia     Epic Nurse VS RR 25-26, pulse oximetry 97 on 2 L bnc   Epic Nurse VS pulse oximetry 80% on RA    Risk factors: gram negative sepsis with shock, UTI, anemia, thrombocytopenia, dementia  Treatments: IVF, supplemental oxygen, monitoring, imaging, medication    Contact me with questions.     Thank you,   NICOLE Roper, CDI  [email or web address]  Options provided:   -- Acute respiratory failure with hypoxia POA   -- Acute respiratory failure with hypoxia not POA   -- Hypoxia POA   -- Hypoxia not POA   -- Other explanation:other explanation-, please specify   -- Unable to determine      Query created by: Dian Zarate on 2023 9:49 AM    RESPONSE TEXT:    Acute respiratory failure with hypoxia not POA          Electronically signed by:  ANN MARIE FREEMAN MD 2023 10:01 AM

## 2023-09-20 NOTE — H&P
Critical Care/Pulmonary H&P    Date of Service: 9/20/2023    Date of Admission:  9/19/2023 10:27 AM    Consulting Physician: EMILY Shipley*    Chief Complaint:  Hypotension (Pt BIBA from [facility]. Pt noted to be hypotensive, systolic in the 80s.  Pt received 2L NS from outlying facility and sent here. Pt denies dizziness at this time. ) and Abnormal Labs (Pt has blood work performed at [facility] last night and results came back with potassium of 2.8 and WBC count of 17.  Pt received 60mcg potassium replacement at the SNF. )      History of Present Illness: Trev Carl is a 88 y.o. male with a past medical history of dementia came to ED from nursing facility for hypotension and abnormal lab findings. Patient was altered per reports on arrival although uncertain what his cognitive baseline is. He knew he was in the ED but otherwise had no idea where he was or why he's here.    In ED patient was hypotensive to the 80s and was given fluid bolus both at the outside facility and in the ED. WBC was elevated and low potassium was repleted. He was started on empiric abx with initial cultures drawn before being sent to tele floor. However, patients clinical picture continued to decline as MAPs remained low despite fluid boluses. U/a suggests infection and b/c grew positive for klebsiella pneumonia. Patient was transferred to ICU on norepi and abx escalated to meropenem.    Review of Systems   Unable to perform ROS: Dementia       Home Medications       Reviewed by Abigail Granda (Pharmacy Tech) on 09/19/23 at 1556  Med List Status: Complete     Medication Last Dose Status   guaiFENesin (ROBITUSSIN) 100 MG/5ML liquid 9/14/2023 Active   melatonin 3 MG Tab 9/18/2023 Active   memantine (NAMENDA) 5 MG Tab 9/19/2023 Active   nystatin (MYCOSTATIN) 954614 UNIT/ML Suspension 9/6/2023 Active   omeprazole (PRILOSEC) 20 MG delayed-release capsule 9/19/2023 Active   oxybutynin SR (DITROPAN-XL) 5 MG TABLET SR 24 HR 9/19/2023  "Active   potassium chloride SA (KDUR) 20 MEQ Tab CR 9/19/2023 Active   senna-docusate (PERICOLACE OR SENOKOT S) 8.6-50 MG Tab 9/19/2023 Active   Sodium Chloride (NS) Solution 9/19/2023 Active   tamsulosin (FLOMAX) 0.4 MG capsule 9/18/2023 Active   terazosin (HYTRIN) 5 MG Cap 9/18/2023 Active   terazosin (HYTRIN) 5 MG Cap 9/18/2023 Active                    Social History     Tobacco Use    Smoking status: Never    Smokeless tobacco: Never   Vaping Use    Vaping Use: Every day        Past Medical History:   Diagnosis Date    Hearing loss        History reviewed. No pertinent surgical history.    Allergies: Patient has no known allergies.    No family history on file.    Vitals:    09/19/23 1035 09/19/23 1039 09/19/23 1047 09/19/23 1102   Height: 1.778 m (5' 10\")      Weight: 90.7 kg (200 lb)      Weight % change since last entry.: 0 %      BP:  92/53 (!) 86/50 94/53   Pulse:  87  71   BMI (Calculated): 28.7      Resp:  20 18 (!) 24   Temp:  35.9 °C (96.6 °F)     TempSrc:  Temporal      09/19/23 1133 09/19/23 1134 09/19/23 1203 09/19/23 1230   Height:       Weight:       Weight % change since last entry.:       BP: 105/64  90/64 91/62   Pulse: (!) 163  77 87   BMI (Calculated):       Resp:  20  20   Temp:       TempSrc:        09/19/23 1245 09/19/23 1315 09/19/23 1330 09/19/23 1400   Height:       Weight:       Weight % change since last entry.:       BP: 117/65 112/59 115/55 (!) 89/53   Pulse: 83 98 87 78   BMI (Calculated):       Resp: 20 20 20 (!) 23   Temp:       TempSrc:        09/19/23 1430 09/19/23 1500 09/19/23 1628 09/19/23 1700   Height:       Weight:       Weight % change since last entry.:       BP: 90/54 93/56 100/56 103/52   Pulse: (!) 101 100 92 80   BMI (Calculated):       Resp: (!) 26 (!) 25 17 16   Temp:       TempS:        09/19/23 1730 09/19/23 1800 09/19/23 1902 09/19/23 2000   Height:       Weight:       Weight % change since last entry.:       BP: 91/53 (!) 89/55 (!) 86/48 (!) 88/50   Pulse: " "90 79 83 91   BMI (Calculated):       Resp: 16 16 (!) 22    Temp:       TempSrc:        09/19/23 2030 09/19/23 2100 09/19/23 2130 09/19/23 2200   Height:       Weight:       Weight % change since last entry.:       BP: 94/52 (!) 87/50 96/52 (!) 82/53   Pulse: (!) 105 84 89 91   BMI (Calculated):       Resp:   (!) 23 (!) 24   Temp:       TempSrc:        09/19/23 2242 09/19/23 2328 09/20/23 0012 09/20/23 0018   Height:    1.778 m (5' 10\")   Weight:   81.3 kg (179 lb 3.7 oz) 81.3 kg (179 lb 3.7 oz)   Weight % change since last entry.:   -10.38 % 0 %   BP: 95/54 125/81     Pulse: 98 73     BMI (Calculated):    25.72   Resp: 19 18     Temp:  36.1 °C (97 °F)     TempSrc:  Temporal      09/20/23 0324 09/20/23 0400 09/20/23 0722 09/20/23 0801   Height:       Weight:  82.6 kg (182 lb 1.6 oz)     Weight % change since last entry.:  1.6 %     BP: 101/61  (!) 75/42 (!) 67/41   Pulse: 71  95 100   BMI (Calculated):       Resp: 18 19 20   Temp: 36.4 °C (97.6 °F)  36.9 °C (98.5 °F) 36.8 °C (98.2 °F)   TempSrc: Temporal  Temporal     09/20/23 0803 09/20/23 0805 09/20/23 0814 09/20/23 0820   Height:       Weight:       Weight % change since last entry.:       BP:  (!) 72/42 (!) 77/43 (!) 84/49   Pulse: (!) 115  (!) 110 74   BMI (Calculated):       Resp:       Temp:       TempSrc:        09/20/23 0835 09/20/23 0900 09/20/23 0905 09/20/23 0915   Height:       Weight:  81.9 kg (180 lb 8.9 oz)     Weight % change since last entry.:  -0.85 %     BP: (!) 87/51 100/55 94/51    Pulse:  86 99 95   BMI (Calculated):       Resp:  (!) 25 (!) 26 (!) 26   Temp:  36.6 °C (97.8 °F)     TempSrc:  Temporal      09/20/23 0930 09/20/23 0945   Height:     Weight:     Weight % change since last entry.:     BP: 99/57 93/63   Pulse: (!) 104 92   BMI (Calculated):     Resp: (!) 36 (!) 22   Temp:     TempSrc:         Physical Exam  Constitutional:       Appearance: He is ill-appearing.   HENT:      Head: Normocephalic and atraumatic.      Right Ear: " Tympanic membrane, ear canal and external ear normal.      Left Ear: Tympanic membrane, ear canal and external ear normal.      Nose: Nose normal.      Mouth/Throat:      Pharynx: Oropharynx is clear.   Eyes:      Extraocular Movements: Extraocular movements intact.      Conjunctiva/sclera: Conjunctivae normal.      Pupils: Pupils are equal, round, and reactive to light.   Cardiovascular:      Rate and Rhythm: Normal rate. Rhythm irregular.      Pulses: Normal pulses.      Heart sounds: Normal heart sounds.   Pulmonary:      Effort: Pulmonary effort is normal.      Breath sounds: Normal breath sounds.   Abdominal:      General: Abdomen is flat. Bowel sounds are normal.      Palpations: Abdomen is soft.   Musculoskeletal:         General: Normal range of motion.      Cervical back: Normal range of motion.   Skin:     General: Skin is warm.   Neurological:      Mental Status: He is disoriented.             Intake/Output Summary (Last 24 hours) at 9/20/2023 0837  Last data filed at 9/20/2023 0000  Gross per 24 hour   Intake 120 ml   Output 800 ml   Net -680 ml       Recent Labs     09/19/23  1150 09/20/23  0143   WBC 16.5* 14.2*   NEUTSPOLYS 95.80*  --    LYMPHOCYTES 0.90*  --    MONOCYTES 1.70  --    EOSINOPHILS 0.00  --    BASOPHILS 0.00  --    ASTSGOT 22  --    ALTSGPT 13  --    ALKPHOSPHAT 147*  --    TBILIRUBIN 0.7  --      Recent Labs     09/19/23  1150 09/20/23  0143   SODIUM 138 140   POTASSIUM 3.6 4.0   CHLORIDE 108 110   CO2 17* 16*   BUN 40* 32*   CREATININE 1.74* 1.51*   CALCIUM 7.4* 8.1*     Recent Labs     09/19/23  1150 09/20/23  0143   ALTSGPT 13  --    ASTSGOT 22  --    ALKPHOSPHAT 147*  --    TBILIRUBIN 0.7  --    GLUCOSE 98 63*         [id number] VIEW   Final Result      1.  Bilateral ureteral stents are in place and appear appropriately positioned.   2.  Nonspecific bowel gas pattern.      DX-CHEST-PORTABLE (1 VIEW)   Final Result      Patchy bilateral airspace opacities suggestive of multifocal  pneumonitis.      EC-ECHOCARDIOGRAM COMPLETE W/O CONT    (Results Pending)       Patient Active Problem List   Diagnosis    Kidney stones    Diarrhea of presumed infectious origin    Urinary retention    Dementia without behavioral disturbance (HCC)    Physical debility    Status post fracture of hip    Anorexia    Acute kidney injury (HCC)    Anemia    Septic shock (HCC)    UTI due to Klebsiella species    Thrombocytopenia (HCC)    Bacteremia    Atrial fibrillation (HCC)       Assessment and Plan:    * Septic shock (HCC)  Assessment & Plan  This is Sepsis Present on admission  SIRS criteria identified on my evaluation include: Tachycardia, with heart rate greater than 90 BPM and Leukocytosis, with WBC greater than 12,000  Clinical indicators of end organ dysfunction include Hypotension with systolic blood pressure less than 90 or MAP less than 65  Source is urinary  Sepsis protocol initiated  Crystalloid Fluid Administration: Fluid resuscitation ordered per standard protocol - 30 mL/kg per current or ideal body weight  IV antibiotics as appropriate for source of sepsis    Patient has UTI and blood cultures positive for klebsiella pneumonia. cxr also shows infiltrates concerning for pneumonitis  Continue meropenem with pending susceptibilities  IV maintenance fluids  MAP goal > 65. On norepi, wean as tolerated  Lactic acid trend  Seizure precautions  Aspiration precautions  Fall precautions    UTI due to Klebsiella species  Assessment & Plan  From urine culture from 9/17  Also with klebsiella bacteremia  Monitor culture for susceptibilities.  Meropenem    Bacteremia  Assessment & Plan  Cultures growing klebsiella  cxr also shows patchy infiltrates concerning for pneumonitis  Continue meropenem  Refer to plan for shock    Acute kidney injury (HCC)  Assessment & Plan  9/19 BUN:40, Cr:1.74    Cr slight improvement now 1.51  IV fluids  Monitor I/O's  Ctm. Has UTI and shock component    Atrial fibrillation  (HCC)  Assessment & Plan  Unclear history. Possibly paroxysmal  Not in RVR  TSH  ECHO  CHADSvasc 2      Thrombocytopenia (HCC)  Assessment & Plan  Plt:95  Monitor cbc    Anemia  Assessment & Plan  Hgb:9.3 without sign of hemorrhage  Monitor CBC    Dementia without behavioral disturbance (HCC)- (present on admission)  Assessment & Plan  Unknown baseline mentation.  Will need family input  memantine          Hoa Chan M.D., MD  [facility]  Patient is critically ill.

## 2023-09-20 NOTE — ASSESSMENT & PLAN NOTE
Chronic stable anemia. No signs of active bleed. Denies lightheadedness,dyspnoe likely dilutional anemia.   F/u CBC

## 2023-09-20 NOTE — ASSESSMENT & PLAN NOTE
He likely has underlying CKD, creatinine improving today after adequate perfusion with Levophed and fluid resuscitation  Bilateral ureteral stents are in place  Monitor renal function and urine output  Avoid nephrotoxins and renal dose medications

## 2023-09-20 NOTE — CARE PLAN
Problem: Pain - Standard  Goal: Alleviation of pain or a reduction in pain to the patient’s comfort goal  Outcome: Progressing     Problem: Knowledge Deficit - Standard  Goal: Patient and family/care givers will demonstrate understanding of plan of care, disease process/condition, diagnostic tests and medications  Outcome: Progressing     Problem: Hemodynamics  Goal: Patient's hemodynamics, fluid balance and neurologic status will be stable or improve  Outcome: Progressing     Problem: Mechanical Ventilation  Goal: Safe management of artificial airway and ventilation  Outcome: Progressing     Problem: Fall Risk  Goal: Patient will remain free from falls  Outcome: Progressing     Problem: Pain - Standard  Goal: Alleviation of pain or a reduction in pain to the patient’s comfort goal  Outcome: Progressing   The patient is Watcher - Medium risk of patient condition declining or worsening    Shift Goals  Clinical Goals: Increase BP  Patient Goals: Rest  Family Goals: MARIVEL    Progress made toward(s) clinical / shift goals:  Patient resting in bed. Titration down on pressor. Turned q2 hours while in bed.

## 2023-09-20 NOTE — ASSESSMENT & PLAN NOTE
From urine culture from 9/17 grew ESBL  klebsiella bacteremia  Repeat blood culture from 9/21/23 shows no growth.  Per ID  IV Meropenem for 7 days followed by chronic therapy by Minocycline.

## 2023-09-20 NOTE — ASSESSMENT & PLAN NOTE
Urine Cultures growing ESBL klebsiella.  Per ID  IV Meropenem for 7 days followed by chronic therapy by Minocycline.

## 2023-09-20 NOTE — PROGRESS NOTES
Pt vitals at 0722 75/42. Dr Mallory notified and at [facility]. 1L LR bolus ordered. BG checked and noted to be 68. D50 given. Rechecked BG of 180. Rapid called d/t BP unresponsive to fluids. Rapid nurse at bedside assisting with care. Pt started on Levophed drip and waiting transfer to CICU.

## 2023-09-20 NOTE — HOSPITAL COURSE
Trev Carl is a 88 y.o. male with cognitive decline who presented 9/19/2023 with a urinary tract infection and hypotension from [facility].  Reportedly he had recently been diagnosed with the UTI two days prior and was on oral antibiotics but despite this was found to be hypotensive at the skilled care facility.  He was given 2L of NS and then sent to [facility] for further care.  The patient has poor recall of recent events but states he normally lives with his grandson but has been at the skilled care recently. He received empiric ceftriaxone for his UTI complicated with sepsis. Patient was transferred to medical floor for further management. His urine culture from 9/17/2023 grew Klebsiella pneumoniae ESBL. We switched ceftriaxone to meropenem. He had episode of hypotension and received 1L bolus of LR. Patient MAP was less than 60 and unable to maintain blood pressure after bolus. Rapid response was called and patient was started on levophed and transferred to IM. Patient was then stabilized and transferred back to the floor. Has been continuing treatment with meropenem per ID, with recommendation for suppression with minocycline after meropenem 7 days are completed. Patient also with stent placement and was seen by urology who did not find emergent intervention need. Patient also with afib, question of whether or not to start anticoagulation. History of dementia and has been at baseline since hospitalization.

## 2023-09-20 NOTE — ASSESSMENT & PLAN NOTE
GNR in the blood, suspect Klebsiella from urinary source  Continue meropenem  Follow-up final culture results and sensitivities  Follow-up repeat surveillance blood culture

## 2023-09-20 NOTE — PROGRESS NOTES
[facility] Internal Medicine Daily Progress Note    Date of Service  9/20/2023    [facility] Team: [facility] IM Purple Team   Attending: Bunny Rodriguez M.d.  Senior Resident: Dr. Duyen chandler   Intern:  Dr. Dinesh barbosa  Contact Number: [phone]    Chief Complaint  Trev Carl is a 88 y.o. male admitted 9/19/2023 with UTI complicated with sepsis.     Hospital Course  Trev Carl is a 88 y.o. male with cognitive decline who presented 9/19/2023 with a urinary tract infection and hypotension from [facility].  Reportedly he had recently been diagnosed with the UTI two days prior and was on oral antibiotics but despite this was found to be hypotensive at the [facility] facility.  He was given 2L of NS and then sent to [facility] for further care.  The patient has poor recall of recent events but states he normally lives with his grandson but has been at the [facility] recently. He received empiric ceftriaxone for his UTI complicated with sepsis. Patient was transferred to medical floor for further management. His urine culture from 9/17/2023 grew Klebsiella pneumoniae ESBL. We switched ceftriaxone to meropenem. He had episode of hypotension and received 1L bolus of LR. Patient MAP was less than 60 and unable to maintain blood pressure after bolus. Rapid response was called and patient was started on levophed and transferred to [facility].       Interval Problem Update  -Presented with UTI complicated with sepsis, received empiric ceftriaxone for it. Patient was transferred to medical floor for further management. His urine culture from 9/17/2023 grew Klebsiella pneumoniae ESBL. We switched ceftriaxone to meropenem. He had episode of hypotension and received 1L bolus of LR. Patient MAP was less than 60 and unable to maintain blood pressure after bolus. Rapid response was called and patient was started on levophed and transferred to IM.     I have discussed this patient's plan of care and discharge plan at  IDT rounds today with Case Management, Nursing, Nursing leadership, and other members of the IDT team.    Consultants/Specialty  critical care    Code Status  Full Code    Disposition  The patient is not medically cleared for discharge to home or a post-acute facility.      I have placed the appropriate orders for post-discharge needs.    Review of Systems  Review of Systems   Constitutional:  Negative for chills, fever and weight loss.   HENT:  Negative for ear discharge, ear pain, nosebleeds and tinnitus.    Eyes:  Negative for blurred vision and double vision.   Respiratory:  Positive for shortness of breath. Negative for cough, hemoptysis and sputum production.    Cardiovascular:  Negative for chest pain, palpitations, orthopnea and leg swelling.   Gastrointestinal:  Negative for abdominal pain, blood in stool, diarrhea, nausea and vomiting.   Genitourinary:  Positive for hematuria. Negative for dysuria.   Neurological:  Positive for weakness. Negative for dizziness, speech change, focal weakness and headaches.        Physical Exam  Temp:  [36.1 °C (97 °F)-36.9 °C (98.5 °F)] 36.6 °C (97.8 °F)  Pulse:  [] 74  Resp:  [8-44] 14  BP: ()/(41-81) 120/56  SpO2:  [80 %-100 %] 98 %    Physical Exam  Constitutional:       Appearance: He is ill-appearing.      Comments: Not oriented to time place and person   HENT:      Head: Normocephalic and atraumatic.      Mouth/Throat:      Mouth: Mucous membranes are dry.      Pharynx: Oropharynx is clear.   Eyes:      Extraocular Movements: Extraocular movements intact.      Pupils: Pupils are equal, round, and reactive to light.   Cardiovascular:      Rate and Rhythm: Tachycardia present. Rhythm irregular.      Heart sounds: Normal heart sounds.   Pulmonary:      Effort: Pulmonary effort is normal. No respiratory distress.      Breath sounds: Normal breath sounds. No rales.   Abdominal:      General: There is no distension.      Palpations: Abdomen is soft.       Tenderness: There is no abdominal tenderness. There is no right CVA tenderness or left CVA tenderness.   Musculoskeletal:         General: Normal range of motion.      Cervical back: Normal range of motion.   Neurological:      Mental Status: He is disoriented.   Psychiatric:      Comments: Abnormal judgement          Fluids    Intake/Output Summary (Last 24 hours) at 9/20/2023 1429  Last data filed at 9/20/2023 1400  Gross per 24 hour   Intake 2240.59 ml   Output 1130 ml   Net 1110.59 ml       Laboratory  Recent Labs     09/19/23  1150 09/20/23  0143   WBC 16.5* 14.2*   RBC 3.21* 3.69*   HEMOGLOBIN 9.3* 10.5*   HEMATOCRIT 29.3* 33.5*   MCV 91.3 90.8   MCH 29.0 28.5   MCHC 31.7* 31.3*   RDW 63.7* 64.0*   PLATELETCT 114* 95*   MPV 11.4 10.7     Recent Labs     09/19/23  1150 09/20/23  0143   SODIUM 138 140   POTASSIUM 3.6 4.0   CHLORIDE 108 110   CO2 17* 16*   GLUCOSE 98 63*   BUN 40* 32*   CREATININE 1.74* 1.51*   CALCIUM 7.4* 8.1*     Recent Labs     09/19/23  1150   INR 1.46*               Imaging  EC-ECHOCARDIOGRAM COMPLETE W/O CONT   Final Result      [id number] VIEW   Final Result      1.  Bilateral ureteral stents are in place and appear appropriately positioned.   2.  Nonspecific bowel gas pattern.      DX-CHEST-PORTABLE (1 VIEW)   Final Result      Patchy bilateral airspace opacities suggestive of multifocal pneumonitis.           Assessment/Plan  Problem Representation:    * Septic shock (HCC)  Assessment & Plan  This is Sepsis Present on admission  SIRS criteria identified on my evaluation include: Tachycardia, with heart rate greater than 90 BPM and Leukocytosis, with WBC greater than 12,000  Clinical indicators of end organ dysfunction include Hypotension with systolic blood pressure less than 90 or MAP less than 65  Source is urinary  Sepsis protocol initiated  Crystalloid Fluid Administration: Fluid resuscitation ordered per standard protocol - 30 mL/kg per current or ideal body weight  IV  antibiotics as appropriate for source of sepsis    Patient has UTI and blood cultures positive for klebsiella pneumonia. cxr also shows infiltrates concerning for pneumonitis  Continue meropenem with pending susceptibilities  IV maintenance fluids  MAP goal > 65. On norepi, wean as tolerated  Lactic acid trend  Seizure precautions  Aspiration precautions  Fall precautions    Thrombocytopenia (HCC)  Assessment & Plan  Plt:95  Monitor cbc    UTI due to Klebsiella species  Assessment & Plan  From urine culture from 9/17  Also with klebsiella bacteremia  Monitor culture for susceptibilities.  Meropenem    Dementia without behavioral disturbance (HCC)- (present on admission)  Assessment & Plan  Unknown baseline mentation.  Will need family input  memantine    Atrial fibrillation (HCC)  Assessment & Plan  Unclear history. Possibly paroxysmal  Not in RVR  TSH  ECHO  CHADSvasc 2      Bacteremia  Assessment & Plan  Cultures growing klebsiella  cxr also shows patchy infiltrates concerning for pneumonitis  Continue meropenem  Refer to plan for shock    Anemia  Assessment & Plan  Hgb:9.3 without sign of hemorrhage  Monitor CBC    Acute kidney injury (HCC)  Assessment & Plan  9/19 BUN:40, Cr:1.74    Cr slight improvement now 1.51  IV fluids  Monitor I/O's  Ctm. Has UTI and shock component         VTE prophylaxis: SCDs/TEDs    I have performed a physical exam and reviewed and updated ROS and Plan today (9/20/2023). In review of yesterday's note (9/19/2023), there are no changes except as documented above.           Glycopyrrolate Pregnancy And Lactation Text: This medication is Pregnancy Category B and is considered safe during pregnancy. It is unknown if it is excreted breast milk.

## 2023-09-20 NOTE — ASSESSMENT & PLAN NOTE
Rate controlled, likely reactive in the setting of sepsis, echo with mild pulmonary pretension, TSH normal  [id number] score 2, After discussion with family member and [facility] facility, patient was not on anti-coag

## 2023-09-20 NOTE — PROGRESS NOTES
Patient moved to -[facility]. Patient is awake and confused. Wounds noted on ears and sacrum. Patient has no belongings. Bed alarm on, call light with in reach and POC discussed.

## 2023-09-20 NOTE — ASSESSMENT & PLAN NOTE
Rate control, likely due to sepsis, echo with mild pulmonary pretension, TSH normal  [id number] score 2  To my knowledge, this is new onset in the setting of sepsis     Will defer anticoagulation at this time as we are not able to consent him on risk v benefits of AC, will reach out to family to get a better idea of his medical conditions

## 2023-09-20 NOTE — PROGRESS NOTES
4 Eyes Skin Assessment Completed by LUCY Arzate and LUCY Murrieta.    Head WDL  Ears WDL  Nose WDL  Mouth WDL  Neck WDL  Breast/Chest WDL  Shoulder Blades WDL  Spine WDL  (R) Arm/Elbow/Hand WDL  (L) Arm/Elbow/Hand WDL  Abdomen WDL  Groin Abrasion  Scrotum/Coccyx/Buttocks Redness, Non-Blanching, Excoriation, and Moisture Fissure, pressure injury noted  (R) Leg WDL  (L) Leg WDL  (R) Heel/Foot/Toe WDL  (L) Heel/Foot/Toe WDL          Devices In Places Tele Box      Interventions In Place Sacral Mepilex    Possible Skin Injury Yes    Pictures Uploaded Into Epic Yes  Wound Consult Placed Yes  RN Wound Prevention Protocol Ordered Yes

## 2023-09-20 NOTE — CARE PLAN
Problem: Knowledge Deficit - Standard  Goal: Patient and family/care givers will demonstrate understanding of plan of care, disease process/condition, diagnostic tests and medications  9/20/2023 0041 by Carito Peñaloza, R.N.  Outcome: Progressing  9/20/2023 0040 by Carito Peñaloza, R.N.  Outcome: Progressing     Problem: Hemodynamics  Goal: Patient's hemodynamics, fluid balance and neurologic status will be stable or improve  9/20/2023 0041 by Carito Peñaloza, R.N.  Outcome: Progressing  9/20/2023 0040 by Carito Peñaloza, R.N.  Outcome: Progressing     Problem: Fluid Volume  Goal: Fluid volume balance will be maintained  9/20/2023 0041 by Carito Peñaloza, R.N.  Outcome: Progressing  9/20/2023 0040 by Carito Peñaloza, R.N.  Outcome: Progressing     Problem: Urinary - Renal Perfusion  Goal: Ability to achieve and maintain adequate renal perfusion and functioning will improve  9/20/2023 0041 by Carito Peñaloza, R.N.  Outcome: Progressing  9/20/2023 0040 by Carito Peñaloza, R.N.  Outcome: Progressing     Problem: Respiratory  Goal: Patient will achieve/maintain optimum respiratory ventilation and gas exchange  9/20/2023 0041 by Carito Peñaloza, R.N.  Outcome: Progressing  9/20/2023 0040 by Carito Peñaloza, R.N.  Outcome: Progressing     Problem: Physical Regulation  Goal: Diagnostic test results will improve  9/20/2023 0041 by Carito Peñaloza, R.N.  Outcome: Progressing  9/20/2023 0040 by Carito Peñaloza, R.N.  Outcome: Progressing  Goal: Signs and symptoms of infection will decrease  9/20/2023 0041 by Carito Peñaloza, R.N.  Outcome: Progressing  9/20/2023 0040 by Carito Peñaloza, R.N.  Outcome: Progressing     Problem: Skin Integrity  Goal: Skin integrity is maintained or improved  9/20/2023 0041 by Carito Peñaloza, R.N.  Outcome: Progressing  9/20/2023 0040 by Carito Peñaloza, R.N.  Outcome: Progressing     Problem: Fall Risk  Goal: Patient will remain free from falls  9/20/2023 0041 by Carito Peñaloza, R.N.  Outcome: Progressing  9/20/2023 0040 by Carito Peñaloza R.N.  Outcome:  Progressing   The patient is Stable - Low risk of patient condition declining or worsening    Shift Goals  Clinical Goals: VSS, safety  Patient Goals: to go home  Family Goals: MARIVEL    Progress made toward(s) clinical / shift goals:  VSS, safety    Patient is not progressing towards the following goals:

## 2023-09-20 NOTE — THERAPY
"Speech Language Pathology   Clinical Swallow Evaluation     Patient Name: Trev Carl  AGE:  88 y.o., SEX:  male  Medical Record #: [id number]  Date of Service: 2023      History of Present Illness  87 y/o male presented  from SNF with UTI and hypotension.    No hx SLP in Epic.     CMHx: Sepsis, UTI, hypotension, dementia, renal insufficiency  PMHx: Kidney stones, physical debility, anorexia    CXR :  \"Patchy bilateral airspace opacities suggestive of multifocal pneumonitis.\"        General Information:  Vitals  O2 (LPM): 2  O2 Delivery Device: Nasal Cannula  Level of Consciousness: Alert, Awake  Patient Behaviors: Confused  Orientation: Self,   Follows Directives: Yes - simple commands only      Prior Living Situation & Level of Function:  Housing / Facility: Skilled Nursing Facility  Lives with - Patient's Self Care Capacity: Attendant / Paid Care Giver  Communication: WFL  Swallowing: WFL       Oral Mechanism Evaluation:  Dentition: Fair, Natural dentition   Facial Symmetry: Equal  Facial Sensation: Equal     Labial Observations: WFL   Lingual Observations: Midline (bulbous protusion on posterior R-dorsal surface)  Motor Speech: WFL           Laryngeal Function:  Secretion Management: Adequate  Voice Quality: WFL  Cough: Perceptually WNL       Subjective  Patient cleared by RN for session. Pt received awake, son at beside, pleasantly confused, eager for PO.      Assessment  Current Method of Nutrition: NPO until cleared by speech pathology  Positioning: Watkins's (60-90 degrees)  Bolus Administration: SLP, Patient  O2 (LPM): 2 O2 Delivery Device: Nasal Cannula  Factor(s) Affecting Performance: Impaired mental status, Impaired command following  Tracheostomy : No          Swallowing Trials:  Swallowing Trials  Ice: WFL  Thin Liquid (TN0): WFL  Liquidised (LQ3): WFL  Soft & Bite Sized (SB6): WFL  Regular (RG7): WFL      Comments: Pt needing 1:1 feeding A d/t tremors (?baseline). Adequate oral bolus " "acceptance/containment. Adequate bite with prolonged but functional mastication of solids. Mild oral residue noted post swallow with trials of dry solids, cleared with thin liquid rinse. No cough/throat clear appreciated with PO intake. Vocal quality remained clear throughout session. Single swallow completed per bolus. No signs of esophageal dysfunction. No overt s/sx of aspiration appreciated.        Clinical Impressions  Patient presents with a functional swallow. Diet modification is not indicated. Service will follow likely 1-2x to ensure diet tolerance and monitor for changes.      Recommendations  Diet Consistency: Regular solids (RG7), thin liquids (TN0)  Instrumentation: None indicated at this time  Medication: As tolerated  Supervision: Encourage self-feeding, Assist with meal tray set up, Direct supervision during meals  Positioning: Fully upright and midline during oral intake  Risk Management : Small bites/sips, Slow rate of intake  Oral Care: BID         SLP Treatment Plan  Treatment Plan: Dysphagia Treatment, Patient/Family/Caregiver Training  SLP Frequency: 2x Per Week  Estimated Duration: Until Therapy Goals Met      Anticipated Discharge Needs  Discharge Recommendations: Anticipate that the patient will have no further speech therapy needs after discharge from the hospital   Therapy Recommendations Upon DC: Patient / Family / Caregiver Education        Patient / Family Goals  Patient / Family Goal #1: \"I really love steak!\"  Short Term Goals  Short Term Goal # 1: Patient will consume a RG/TN diet with no overt s/sx of aspiration.      Lynn Mckenzie MS,CCC-SLP    "

## 2023-09-20 NOTE — ED NOTES
Received bedside report from Jhonny CORONADO RN; assumed care of Pt.    No oxygen.  No SI precautions.  High fall risk precautions.  Cardiac monitor in place.    Introduced self to Pt; informed him that I am part of his care team.  Rounded on Pt. Updated Pt on plan of care. Pt verbalized understanding.  No acute distress at this time.  Will continue to monitor.

## 2023-09-20 NOTE — ASSESSMENT & PLAN NOTE
Present on admission  Urinary and blood source  s/p 30 mL/kg IV fluid bolus  Intravenous meropenem started yesterday with improvement in symtoms  I am titrating norepinephrine to keep mean arterial pressure greater than 65

## 2023-09-20 NOTE — PROGRESS NOTES
Multiple attempts to draw labs.    Unable to.     Reached out to phlebotomy for assistance via volate.

## 2023-09-20 NOTE — ASSESSMENT & PLAN NOTE
Unknown at baseline mentation.  After family member discussion, patient is at baseline dementia.  Continue memantine  PT/OT eval pending

## 2023-09-20 NOTE — PROGRESS NOTES
NOC CROSSCOVER    1 set of Blood cultures positive for gram negative rods.  Patient is already on ceftriaxone.  Ordered another set of blood cultures.

## 2023-09-20 NOTE — CONSULTS
PULMONARY AND CRITICAL CARE MEDICINE CONSULTATION    Date of Consultation:  9/20/2023    Requesting Physician:  Minh Gutierrez MD    Consulting Physician:  Bunny Rodriguez MD    Reason for Consultation: Septic shock    Chief Complaint: Septic shock    History of Present Illness:    I was kindly asked to see and evaluate Trev Carl, a 88 y.o. male for evaluation and management of the above problem.    The history is obtained from healthcare providers and the medical record as this gentleman has dementia and cannot provide a reliable history.  This gentleman has a history of dementia and urolithiasis with bilateral ureteral stents in place.  He was admitted on or about 9/19/2023 with acute cystitis from a urinary source.  I was asked to see him today due to persistent hypotension.    Medications Prior to Admission:    No current facility-administered medications on file prior to encounter.     Current Outpatient Medications on File Prior to Encounter   Medication Sig Dispense Refill    melatonin 3 MG Tab Take 3 mg by mouth at bedtime.      memantine (NAMENDA) 5 MG Tab Take 5 mg by mouth every day.      omeprazole (PRILOSEC) 20 MG delayed-release capsule Take 20 mg by mouth every day.      oxybutynin SR (DITROPAN-XL) 5 MG TABLET SR 24 HR Take 5 mg by mouth every day.      potassium chloride SA (KDUR) 20 MEQ Tab CR Take 60 mEq by mouth every day. 20 meq x 3 tablets = 60 meq      tamsulosin (FLOMAX) 0.4 MG capsule Take 0.4 mg by mouth every evening.      terazosin (HYTRIN) 5 MG Cap Take 5 mg by mouth every evening.      terazosin (HYTRIN) 5 MG Cap Take 5 mg by mouth every day.      senna-docusate (PERICOLACE OR SENOKOT S) 8.6-50 MG Tab Take 1 Tablet by mouth 2 times a day.      nystatin (MYCOSTATIN) 441129 UNIT/ML Suspension Take 500,000 Units by mouth 4 times a day.      guaiFENesin (ROBITUSSIN) 100 MG/5ML liquid Take 10 mL by mouth every four hours as needed for Cough.         Current  Medications:      Current Facility-Administered Medications:     Notify MD and PharmD if FSBG level is less than or equal to 70 mg/dL or patient is showing signs/symptoms of hypoglycemia (tachycardia, palpitations, diaphoresis, clammy, tremulousness, nausea, confused), , , Once **AND** Administer 20 grams of glucose (approximately 8 ounces of fruit juice) every 15 minutes PRN FSBS less than 70 mg/dL, , , PRN **AND** dextrose 50% (D50W) injection 25 g, 25 g, Intravenous, Q15 MIN PRN, DAVID Olson, 25 g at 09/20/23 0752    [Held by provider] heparin injection 5,000 Units, 5,000 Units, Subcutaneous, Q8HRS, Dinesh Cerda M.D.    meropenem (Merrem) 500 mg in  mL IV-MBP, 500 mg, Intravenous, Q8HRS, Dinesh Cerda M.D., Last Rate: 200 mL/hr at 09/20/23 0912, 500 mg at 09/20/23 0912    NOREPINEPHRINE-SODIUM CHLORIDE 8-0.9 MG/250ML-% IV SOLN, , , ,     norepinephrine (Levophed) 8 mg in 250 mL NS infusion (premix), 0-1 mcg/kg/min (Ideal), Intravenous, Continuous, Bunny Rodriguez M.D., Last Rate: 27.4 mL/hr at 09/20/23 0836, 0.2 mcg/kg/min at 09/20/23 0836    MD Alert...ICU Electrolyte Replacement per Pharmacy, , Other, PHARMACY TO DOSE, Bunny Rodriguez M.D.    senna-docusate (Pericolace Or Senokot S) 8.6-50 MG per tablet 2 Tablet, 2 Tablet, Oral, BID, 2 Tablet at 09/20/23 0510 **AND** polyethylene glycol/lytes (Miralax) PACKET 1 Packet, 1 Packet, Oral, QDAY PRN **AND** magnesium hydroxide (Milk Of Magnesia) suspension 30 mL, 30 mL, Oral, QDAY PRN **AND** bisacodyl (Dulcolax) suppository 10 mg, 10 mg, Rectal, QDAY PRN, Bunny Martínez D.O.    Respiratory Therapy Consult, , Nebulization, Continuous RT, Bunny Martínez D.O.    labetalol (Normodyne/Trandate) injection 10 mg, 10 mg, Intravenous, Q4HRS PRN, Bunny Martínez D.O.    ondansetron (Zofran) syringe/vial injection 4 mg, 4 mg, Intravenous, Q4HRS PRN, Bunny Martínez D.O.    ondansetron (Zofran ODT) dispertab 4 mg, 4 mg, Oral, Q4HRS  "PRN, Bunny Martínez D.O.    memantine (Namenda) tablet 5 mg, 5 mg, Oral, DAILY, Zee Smiley, A.P.R.N., 5 mg at 09/20/23 0510    omeprazole (PriLOSEC) capsule 20 mg, 20 mg, Oral, DAILY, Zee Smiley A.P.R.N., 20 mg at 09/20/23 0510    oxybutynin SR (Ditropan-XL) tablet 5 mg, 5 mg, Oral, DAILY, Zee Smiley A.P.R.N., 5 mg at 09/20/23 0510    tamsulosin (Flomax) capsule 0.4 mg, 0.4 mg, Oral, Q EVENING, Zee Smiley A.P.R.N.    Allergies:    Patient has no known allergies.    Past Surgical History:    History reviewed. No pertinent surgical history.    Past Medical History:    Past Medical History:   Diagnosis Date    Hearing loss        Social History:    Social History     Socioeconomic History    Marital status:      Spouse name: Not on file    Number of children: Not on file    Years of education: Not on file    Highest education level: Not on file   Occupational History    Not on file   Tobacco Use    Smoking status: Never    Smokeless tobacco: Never   Vaping Use    Vaping Use: Every day   Substance and Sexual Activity    Alcohol use: Not on file    Drug use: Not on file    Sexual activity: Not on file   Other Topics Concern    Not on file   Social History Narrative    Not on file     Social Determinants of Health     Financial Resource Strain: Not on file   Food Insecurity: Not on file   Transportation Needs: Not on file   Physical Activity: Not on file   Stress: Not on file   Social Connections: Not on file   Intimate Partner Violence: Not on file   Housing Stability: Not on file       Family History:    No family history on file.    Review of System:    Review of Systems   Unable to perform ROS: Acuity of condition       Physical Examination:    BP 94/51   Pulse 99   Temp 36.6 °C (97.8 °F) (Temporal)   Resp (!) 26   Ht 1.778 m (5' 10\")   Wt 81.9 kg (180 lb 8.9 oz)   SpO2 92%   BMI 25.91 kg/m²   Physical Exam  Constitutional:       Appearance: He is not diaphoretic. "   HENT:      Head: Normocephalic.      Mouth/Throat:      Pharynx: Oropharynx is clear.   Eyes:      Pupils: Pupils are equal, round, and reactive to light.   Cardiovascular:      Comments: Atrial fibrillation  Pulmonary:      Breath sounds: No wheezing or rales.   Abdominal:      General: There is no distension.      Tenderness: There is no abdominal tenderness.   Musculoskeletal:      Right lower leg: No edema.      Left lower leg: No edema.   Skin:     General: Skin is warm.      Capillary Refill: Capillary refill takes less than 2 seconds.   Neurological:      Comments: Confused, but very pleasant.  No focal weakness.         Laboratory Data:        Recent Labs     09/19/23  1150 09/20/23  0143   WBC 16.5* 14.2*   RBC 3.21* 3.69*   HEMOGLOBIN 9.3* 10.5*   HEMATOCRIT 29.3* 33.5*   MCV 91.3 90.8   MCH 29.0 28.5   MCHC 31.7* 31.3*   RDW 63.7* 64.0*   PLATELETCT 114* 95*   MPV 11.4 10.7     Recent Labs     09/19/23  1150 09/20/23  0143   SODIUM 138 140   POTASSIUM 3.6 4.0   CHLORIDE 108 110   CO2 17* 16*   GLUCOSE 98 63*   BUN 40* 32*   CREATININE 1.74* 1.51*   CALCIUM 7.4* 8.1*                   Imaging:    I personally viewed all the available CXR and CT scan images as well as reviewed the radiology interpretation reports.    [id number] VIEW   Final Result      1.  Bilateral ureteral stents are in place and appear appropriately positioned.   2.  Nonspecific bowel gas pattern.      DX-CHEST-PORTABLE (1 VIEW)   Final Result      Patchy bilateral airspace opacities suggestive of multifocal pneumonitis.      EC-ECHOCARDIOGRAM COMPLETE W/O CONT    (Results Pending)       Assessment and Plan:    * Septic shock (HCC)  Assessment & Plan  Present on admission  Urinary source  I administered 30 mL/kg IV fluid bolus  Intravenous antimicrobial chemotherapy  I am titrating norepinephrine to keep mean arterial pressure greater than 65    Atrial fibrillation (HCC)  Assessment & Plan  Rate control  Echocardiogram  Check thyroid  function  [id number] score 2    Bacteremia  Assessment & Plan  Klebsiella species in the blood  Suspect ESBL Klebsiella from urinary source  Change ceftriaxone to meropenem    UTI due to Klebsiella species  Assessment & Plan  ESBL Klebsiella in the urine  Change ceftriaxone to meropenem    Acute kidney injury (HCC)  Assessment & Plan  He likely has underlying CKD  Bilateral ureteral stents are in place  Monitor renal function and urine output  Avoid nephrotoxins and renal dose medications    Dementia without behavioral disturbance (HCC)- (present on admission)  Assessment & Plan           High risk of deterioration and worsening vital organ dysfunction and death without the above critical care interventions.    I have assessed and reassessed his blood pressure, hemodynamics and cardiovascular status with the titration of norepinephrine.  He is at increased risk for worsening cardiovascular system dysfunction.    Thank you for allowing me to participate in the care of this gentleman.  I will continue to follow him with great interest.    The patient is critically ill.  Critical care time = 35 minutes in directly providing and coordinating critical care and extensive data review.  No time overlap and excludes procedures.    Discussed with hospitalist, RN, [facility] internal medicine resident    Bunny Rodriguez MD  Pulmonary and Critical Care Medicine

## 2023-09-20 NOTE — ASSESSMENT & PLAN NOTE
This is Sepsis Present on admission  SIRS criteria identified on my evaluation include: Tachycardia, with heart rate greater than 90 BPM and Leukocytosis, with WBC greater than 12,000  Clinical indicators of end organ dysfunction include Hypotension with systolic blood pressure less than 90 or MAP less than 65 he was transferred to the ICU for IV pressors  Resolved  Source is urinary stents.  Sepsis protocol was initiated  Crystalloid Fluid Administration: Fluid resuscitation ordered per standard protocol was given.  Patient was transferred to IMCU for vasopressors. Bounced back to medical floor on 09/22/23 after blood pressure was normalized.   Per ID  IV Meropenem for 7 days followed by chronic therapy by Minocycline.

## 2023-09-20 NOTE — ASSESSMENT & PLAN NOTE
resolved  2/2 to septic shock and third spacing of fluids  Bun improving 26, Creatinine at baseline.  F/u renal functions.

## 2023-09-20 NOTE — ED NOTES
Placed Pt on hospital bed; bed alarm armed.    Meal tray at bedside.    Rounded on Pt.    Updated Pt on plan of care. Pt verbalized understanding.  No acute distress at this time.  Will continue to monitor.

## 2023-09-21 LAB
ANION GAP SERPL CALC-SCNC: 10 MMOL/L (ref 7–16)
BACTERIA UR CULT: ABNORMAL
BASOPHILS # BLD AUTO: 0.3 % (ref 0–1.8)
BASOPHILS # BLD: 0.04 K/UL (ref 0–0.12)
BUN SERPL-MCNC: 31 MG/DL (ref 8–22)
CALCIUM SERPL-MCNC: 7.2 MG/DL (ref 8.5–10.5)
CHLORIDE SERPL-SCNC: 112 MMOL/L (ref 96–112)
CO2 SERPL-SCNC: 21 MMOL/L (ref 20–33)
CREAT SERPL-MCNC: 1.35 MG/DL (ref 0.5–1.4)
EOSINOPHIL # BLD AUTO: 0.1 K/UL (ref 0–0.51)
EOSINOPHIL NFR BLD: 0.8 % (ref 0–6.9)
ERYTHROCYTE [DISTWIDTH] IN BLOOD BY AUTOMATED COUNT: 64.2 FL (ref 35.9–50)
GFR SERPLBLD CREATININE-BSD FMLA CKD-EPI: 50 ML/MIN/1.73 M 2
GLUCOSE SERPL-MCNC: 105 MG/DL (ref 65–99)
HCT VFR BLD AUTO: 26.8 % (ref 42–52)
HCT VFR BLD AUTO: 29.7 % (ref 42–52)
HGB BLD-MCNC: 8.4 G/DL (ref 14–18)
HGB BLD-MCNC: 9.6 G/DL (ref 14–18)
IMM GRANULOCYTES # BLD AUTO: 0.38 K/UL (ref 0–0.11)
IMM GRANULOCYTES NFR BLD AUTO: 3 % (ref 0–0.9)
LACTATE SERPL-SCNC: 2.4 MMOL/L (ref 0.5–2)
LACTATE SERPL-SCNC: 2.5 MMOL/L (ref 0.5–2)
LACTATE SERPL-SCNC: 3 MMOL/L (ref 0.5–2)
LYMPHOCYTES # BLD AUTO: 1.76 K/UL (ref 1–4.8)
LYMPHOCYTES NFR BLD: 13.8 % (ref 22–41)
MAGNESIUM SERPL-MCNC: 1.6 MG/DL (ref 1.5–2.5)
MCH RBC QN AUTO: 28.5 PG (ref 27–33)
MCHC RBC AUTO-ENTMCNC: 31.3 G/DL (ref 32.3–36.5)
MCV RBC AUTO: 90.8 FL (ref 81.4–97.8)
MONOCYTES # BLD AUTO: 0.41 K/UL (ref 0–0.85)
MONOCYTES NFR BLD AUTO: 3.2 % (ref 0–13.4)
NEUTROPHILS # BLD AUTO: 10.03 K/UL (ref 1.82–7.42)
NEUTROPHILS NFR BLD: 78.9 % (ref 44–72)
NRBC # BLD AUTO: 0 K/UL
NRBC BLD-RTO: 0 /100 WBC (ref 0–0.2)
PHOSPHATE SERPL-MCNC: 3.2 MG/DL (ref 2.5–4.5)
PLATELET # BLD AUTO: 107 K/UL (ref 164–446)
PMV BLD AUTO: 10.6 FL (ref 9–12.9)
POTASSIUM SERPL-SCNC: 3.2 MMOL/L (ref 3.6–5.5)
RBC # BLD AUTO: 2.95 M/UL (ref 4.7–6.1)
SIGNIFICANT IND 70042: ABNORMAL
SITE SITE: ABNORMAL
SODIUM SERPL-SCNC: 143 MMOL/L (ref 135–145)
SOURCE SOURCE: ABNORMAL
WBC # BLD AUTO: 12.7 K/UL (ref 4.8–10.8)

## 2023-09-21 PROCEDURE — 99233 SBSQ HOSP IP/OBS HIGH 50: CPT | Performed by: HOSPITALIST

## 2023-09-21 PROCEDURE — 80048 BASIC METABOLIC PNL TOTAL CA: CPT

## 2023-09-21 PROCEDURE — 99291 CRITICAL CARE FIRST HOUR: CPT | Performed by: INTERNAL MEDICINE

## 2023-09-21 PROCEDURE — A9270 NON-COVERED ITEM OR SERVICE: HCPCS | Mod: JZ | Performed by: INTERNAL MEDICINE

## 2023-09-21 PROCEDURE — A9270 NON-COVERED ITEM OR SERVICE: HCPCS

## 2023-09-21 PROCEDURE — 770020 HCHG ROOM/CARE - TELE (206)

## 2023-09-21 PROCEDURE — 85025 COMPLETE CBC W/AUTO DIFF WBC: CPT

## 2023-09-21 PROCEDURE — 700105 HCHG RX REV CODE 258

## 2023-09-21 PROCEDURE — 700102 HCHG RX REV CODE 250 W/ 637 OVERRIDE(OP): Performed by: HOSPITALIST

## 2023-09-21 PROCEDURE — 700102 HCHG RX REV CODE 250 W/ 637 OVERRIDE(OP): Mod: JZ | Performed by: INTERNAL MEDICINE

## 2023-09-21 PROCEDURE — 700111 HCHG RX REV CODE 636 W/ 250 OVERRIDE (IP)

## 2023-09-21 PROCEDURE — 85014 HEMATOCRIT: CPT

## 2023-09-21 PROCEDURE — 85018 HEMOGLOBIN: CPT

## 2023-09-21 PROCEDURE — A9270 NON-COVERED ITEM OR SERVICE: HCPCS | Performed by: HOSPITALIST

## 2023-09-21 PROCEDURE — 700102 HCHG RX REV CODE 250 W/ 637 OVERRIDE(OP)

## 2023-09-21 PROCEDURE — 84100 ASSAY OF PHOSPHORUS: CPT

## 2023-09-21 PROCEDURE — 700111 HCHG RX REV CODE 636 W/ 250 OVERRIDE (IP): Mod: JZ | Performed by: INTERNAL MEDICINE

## 2023-09-21 PROCEDURE — 83735 ASSAY OF MAGNESIUM: CPT

## 2023-09-21 RX ORDER — POTASSIUM CHLORIDE 20 MEQ/1
40 TABLET, EXTENDED RELEASE ORAL EVERY 6 HOURS
Status: COMPLETED | OUTPATIENT
Start: 2023-09-21 | End: 2023-09-21

## 2023-09-21 RX ORDER — MAGNESIUM SULFATE HEPTAHYDRATE 40 MG/ML
2 INJECTION, SOLUTION INTRAVENOUS ONCE
Status: COMPLETED | OUTPATIENT
Start: 2023-09-21 | End: 2023-09-21

## 2023-09-21 RX ADMIN — POTASSIUM CHLORIDE 40 MEQ: 1500 TABLET, EXTENDED RELEASE ORAL at 08:25

## 2023-09-21 RX ADMIN — MAGNESIUM SULFATE HEPTAHYDRATE 2 G: 2 INJECTION, SOLUTION INTRAVENOUS at 08:27

## 2023-09-21 RX ADMIN — SENNOSIDES AND DOCUSATE SODIUM 2 TABLET: 50; 8.6 TABLET ORAL at 05:24

## 2023-09-21 RX ADMIN — POTASSIUM CHLORIDE 40 MEQ: 1500 TABLET, EXTENDED RELEASE ORAL at 15:29

## 2023-09-21 RX ADMIN — MEROPENEM 500 MG: 500 INJECTION, POWDER, FOR SOLUTION INTRAVENOUS at 05:22

## 2023-09-21 RX ADMIN — MEROPENEM 500 MG: 500 INJECTION, POWDER, FOR SOLUTION INTRAVENOUS at 22:51

## 2023-09-21 RX ADMIN — OMEPRAZOLE 20 MG: 20 CAPSULE, DELAYED RELEASE ORAL at 05:24

## 2023-09-21 RX ADMIN — RIVAROXABAN 10 MG: 10 TABLET, FILM COATED ORAL at 17:52

## 2023-09-21 RX ADMIN — TAMSULOSIN HYDROCHLORIDE 0.4 MG: 0.4 CAPSULE ORAL at 17:52

## 2023-09-21 RX ADMIN — MEROPENEM 500 MG: 500 INJECTION, POWDER, FOR SOLUTION INTRAVENOUS at 16:29

## 2023-09-21 RX ADMIN — MEMANTINE HYDROCHLORIDE 5 MG: 10 TABLET ORAL at 05:24

## 2023-09-21 RX ADMIN — OXYBUTYNIN CHLORIDE 5 MG: 5 TABLET, EXTENDED RELEASE ORAL at 05:25

## 2023-09-21 RX ADMIN — HEPARIN SODIUM 5000 UNITS: 5000 INJECTION, SOLUTION INTRAVENOUS; SUBCUTANEOUS at 15:29

## 2023-09-21 ASSESSMENT — COGNITIVE AND FUNCTIONAL STATUS - GENERAL
MOVING TO AND FROM BED TO CHAIR: UNABLE
TOILETING: TOTAL
EATING MEALS: A LITTLE
WALKING IN HOSPITAL ROOM: TOTAL
MOVING FROM LYING ON BACK TO SITTING ON SIDE OF FLAT BED: UNABLE
SUGGESTED CMS G CODE MODIFIER MOBILITY: CM
CLIMB 3 TO 5 STEPS WITH RAILING: TOTAL
DRESSING REGULAR LOWER BODY CLOTHING: A LOT
TURNING FROM BACK TO SIDE WHILE IN FLAT BAD: A LOT
DRESSING REGULAR UPPER BODY CLOTHING: TOTAL
MOBILITY SCORE: 7
HELP NEEDED FOR BATHING: TOTAL
DAILY ACTIVITIY SCORE: 11
PERSONAL GROOMING: A LITTLE
STANDING UP FROM CHAIR USING ARMS: TOTAL
SUGGESTED CMS G CODE MODIFIER DAILY ACTIVITY: CL

## 2023-09-21 ASSESSMENT — FIBROSIS 4 INDEX: FIB4 SCORE: 5.66

## 2023-09-21 ASSESSMENT — PAIN DESCRIPTION - PAIN TYPE
TYPE: ACUTE PAIN

## 2023-09-21 NOTE — WOUND TEAM
[facility]  Inpatient Services  Initial Wound and Skin Care Evaluation    Admission Date: 9/19/2023     Last order of IP CONSULT TO WOUND CARE was found on 9/20/2023 from Hospital Encounter on 9/19/2023     HPI, PMH, SH: Reviewed    History reviewed. No pertinent surgical history.  Social History     Tobacco Use    Smoking status: Never    Smokeless tobacco: Never   Substance Use Topics    Alcohol use: Not on file     Chief Complaint   Patient presents with    Hypotension     Pt BIBA from [facility]. Pt noted to be hypotensive, systolic in the 80s.  Pt received 2L NS from outlying facility and sent here. Pt denies dizziness at this time.     Abnormal Labs     Pt has blood work performed at [facility] last night and results came back with potassium of 2.8 and WBC count of 17.  Pt received 60mcg potassium replacement at the [facility].      Diagnosis: Sepsis (HCC) [A41.9]    Unit where seen by Wound Team: T620/00     WOUND CONSULT RELATED TO:  Sacrum     WOUND TEAM PLAN OF CARE - Frequency of Follow-up:   Nursing to follow dressing orders written for wound care. Contact wound team if area fails to progress, deteriorates or with any questions/concerns if something comes up before next scheduled follow up (See below as to whether wound is following and frequency of wound follow up)     Weekly -      WOUND HISTORY:   Brought from [facility], noted to be hypotensive.        WOUND ASSESSMENT/LDA    Wound 09/20/23 Pressure Injury Sacrum POA unstageable (Active)   Date First Assessed/Time First Assessed: 09/20/23 0000   Present on Original Admission: Yes  Hand Hygiene Completed: Yes  Primary Wound Type: Pressure Injury  Location: Sacrum  Wound Description (Comments): POA unstageable      Assessments 9/21/2023 12:16 PM   Wound Image     Site Assessment Yellow;Slough   Periwound Assessment Denuded   Margins Defined edges   Closure Open to air   Drainage Amount Small   Drainage Description Sanguineous   Treatments Cleansed;Site  care   Wound Cleansing Foam Cleanser/Washcloth   Periwound Protectant Barrier Paste   Dressing Status Open to Air   Dressing Cleansing/Solutions Not Applicable   Dressing Options Open to Air   Dressing Change/Treatment Frequency Every Shift, and As Needed   NEXT Dressing Change/Treatment Date 09/21/23   NEXT Weekly Photo (Inpatient Only) 09/28/23   Wound Team Following Weekly   Pressure Injury Stage Unstageable   Non-staged Wound Description Not applicable   Shape irregular   Wound Odor None   WOUND NURSE ONLY - Time Spent with Patient (mins) 30        Vascular:    FIONA:   No results found.    Lab Values:    Lab Results   Component Value Date/Time    WBC 12.7 (H) 09/21/2023 04:05 AM    RBC 2.95 (L) 09/21/2023 04:05 AM    HEMOGLOBIN 8.4 (L) 09/21/2023 04:05 AM    HEMATOCRIT 26.8 (L) 09/21/2023 04:05 AM         Culture Results show:  No results found for this or any previous visit (from the past 720 hour(s)).    Pain Level/Medicated:  Patient denies pain       INTERVENTIONS BY WOUND TEAM:  Chart and images reviewed. Discussed with bedside RN. All areas of concern (based on picture review, LDA review and discussion with bedside RN) have been thoroughly assessed. Documentation of areas based on significant findings. This RN in to assess patient. Performed standard wound care which includes appropriate positioning, dressing removal and non-selective debridement. Pictures and measurements obtained weekly if/when required.    Wound:  Sacrum   Barrier paste applied     Advanced Wound Care Discharge Planning  Number of Clinicians necessary to complete wound care: 2  Is patient requiring IV pain medications for dressing changes:  No   Length of time for dressing change 20 min. (This does not include chart review, pre-medication time, set up, clean up or time spent charting.)    Interdisciplinary consultation: Patient, Bedside RN, Irma (Wound Tech).  Pressure injury and staging reviewed with N/A.    EVALUATION / RATIONALE FOR  TREATMENT:     Date:  09/21/23  Wound Status:  Initial evaluation    Patient with unstageable pressure injury to sacrum with surrounding dermatitis. Slough obscures majority of wound bed. Pt incontinent of stool thus barrier paste applied.          Goals: Steady decrease in wound area and depth weekly.    NURSING PLAN OF CARE ORDERS:  Skin care: See Skin Care orders    NUTRITION RECOMMENDATIONS   Wound Team Recommendations:  N/A    DIET ORDERS (From admission to next 24h)       Start     Ordered    09/20/23 1222  Diet Order Diet: Regular; Tray Modifications (optional): SLP - Deliver to Nursing Station  ALL MEALS        Question Answer Comment   Diet: Regular    Tray Modifications (optional) SLP - Deliver to Nursing Station        09/20/23 1222                    PREVENTATIVE INTERVENTIONS:    Q shift Lawrence - performed per nursing policy  Q shift pressure point assessments - performed per nursing policy    Surface/Positioning  Low Airloss - Currently in Place  Reposition q 2 hours - Currently in Place    Containment/Moisture Prevention    Dri-janak pad - Currently in Place  Barrier paste - Currently in Place    Anticipated discharge plans:  Skilled Nursing        Vac Discharge Needs:  Vac Discharge plan is purely a recommendation from wound team and not a requirement for discharge unless otherwise stated by physician.  Not Applicable Pt not on a wound vac

## 2023-09-21 NOTE — ASSESSMENT & PLAN NOTE
Hemoglobin acutely dropped from 10.4-8.5 today, I suspect that this may be dilutional anemia after patient received IV fluids yesterday in the setting of septic shock  Will repeat hemoglobin at this time

## 2023-09-21 NOTE — CARE PLAN
Patient transferred from [facility] unit to [facility] unit. Admission assessment done. Patient complains of hip pain when moved. Attended to needs. Call light within reach.     The patient is Stable - Low risk of patient condition declining or worsening    Shift Goals  Clinical Goals: Hemodynamic stability, out of ICU  Patient Goals: Rest  Family Goals: MARIVEL      Problem: Knowledge Deficit - Standard  Goal: Patient and family/care givers will demonstrate understanding of plan of care, disease process/condition, diagnostic tests and medications  Outcome: Progressing     Problem: Hemodynamics  Goal: Patient's hemodynamics, fluid balance and neurologic status will be stable or improve  Outcome: Progressing     Problem: Fluid Volume  Goal: Fluid volume balance will be maintained  Outcome: Progressing

## 2023-09-21 NOTE — PROGRESS NOTES
[facility] ICU Progress Note      Admit Date: 9/19/2023  ICU Day:  2      Resident(s): Zev Zhao D.O.  Attending: Dr. Crabtree    Date & Time:   9/21/2023   9:17 AM       Patient ID:    Name:             Trev Carl   YOB: 1935  Age:                 88 y.o.  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  He initially responded to fluid resuscitation and was admitted to the telemetry floor.  He subsequently developed septic shock due to urinary source and was transferred to the ICU for blood pressure support with vasopressors.   Urine cultures growing ESBL klebsiella Pneumoniae, and now blood cultures positive as well.      Hospital Course:  9/19:  Admission  9/20: septic shock requiring vasopressor support    Interval Events:  NAEON.  Was able to be weaned off vasopressors around 1600 yesterday afternoon.    BP remains low, responds well to small fluid boluses.  Has fluctuated between NSR and a-fib.  Net positive 1297ml  Currently maintaining stable SpO2 on 2LPM.  Hemoglobin did decrease, no obvious signs of bleeding.    Consultants:  Critical care     Procedures:  N/a      Review of Systems   Unable to perform ROS: Dementia       PHYSICAL EXAM    Physical Exam  Vitals and nursing note reviewed.   Constitutional:       General: He is not in acute distress.     Appearance: He is ill-appearing (chronically ill appearing). He is not toxic-appearing.   HENT:      Head: Normocephalic and atraumatic.      Mouth/Throat:      Mouth: Mucous membranes are moist.      Pharynx: Oropharynx is clear.   Eyes:      General: No scleral icterus.     Conjunctiva/sclera: Conjunctivae normal.   Cardiovascular:      Rate and Rhythm: Normal rate and regular rhythm.      Heart sounds: No murmur heard.     No friction rub. No  gallop.   Pulmonary:      Effort: No respiratory distress.      Breath sounds: No wheezing, rhonchi or rales.   Abdominal:      General: Abdomen is flat. There is no distension.      Palpations: Abdomen is soft.      Tenderness: There is no abdominal tenderness.   Musculoskeletal:      Right lower leg: Edema present.      Left lower leg: Edema present.      Comments: 1+ pitting edema bilaterally   Skin:     General: Skin is warm and dry.   Neurological:      Mental Status: He is alert.      Comments: A+Ox1-2.  Moving all 4 extremities spontaneously, not participatory in thorough neuro exam.          Respiratory:     Respiration: 17, Pulse Oximetry: 98 %    Chest Tube Drains:          HemoDynamics:  Pulse: 71 Blood Pressure : 113/59      Neuro:      Fluids:       Intake/Output Summary (Last 24 hours) at 2023 0917  Last data filed at 2023 0600  Gross per 24 hour   Intake 2477.2 ml   Output 1255 ml   Net 1222.2 ml       Weight: 80.8 kg (178 lb 2.1 oz)  Body mass index is 25.56 kg/m².    Recent Labs     23  0143 09/20/23  1508 23  0405   SODIUM 140 140 143   POTASSIUM 4.0 3.9 3.2*   CHLORIDE 110 109 112   CO2 16* 18* 21   BUN 32* 30* 31*   CREATININE 1.51* 1.54* 1.35   MAGNESIUM  --   --  1.6   PHOSPHORUS  --   --  3.2   CALCIUM 8.1* 7.9* 7.2*       GI/Nutrition:  Recent Labs     23  1150 23  0143 09/20/23  1508 23  0405   ALTSGPT 13  --  19  --    ASTSGOT 22  --  30  --    ALKPHOSPHAT 147*  --  183*  --    TBILIRUBIN 0.7  --  0.7  --    GLUCOSE 98 63* 158* 105*       Heme:  Recent Labs     23  1150 23  0143 23  1508 23  0405   RBC 3.21* 3.69* 3.75* 2.95*   HEMOGLOBIN 9.3* 10.5* 10.9* 8.4*   HEMATOCRIT 29.3* 33.5* 33.8* 26.8*   PLATELETCT 114* 95* 121* 107*   PROTHROMBTM 17.9*  --   --   --    INR 1.46*  --   --   --        Infectious Disease:  Temp  Av.9 °C (98.4 °F)  Min: 36.7 °C (98.1 °F)  Max: 37 °C (98.6 °F)  Recent Labs     23  1155  09/20/23  0143 09/20/23  1508 09/21/23  0405   WBC 16.5* 14.2* 29.2* 12.7*   NEUTSPOLYS 95.80*  --  87.90* 78.90*   LYMPHOCYTES 0.90*  --  5.80* 13.80*   MONOCYTES 1.70  --  2.20 3.20   EOSINOPHILS 0.00  --  0.10 0.80   BASOPHILS 0.00  --  0.60 0.30   ASTSGOT 22  --  30  --    ALTSGPT 13  --  19  --    ALKPHOSPHAT 147*  --  183*  --    TBILIRUBIN 0.7  --  0.7  --        Meds:   potassium chloride SA  40 mEq      magnesium sulfate  2 g 2 g (09/21/23 0827)    dextrose bolus  25 g      [Held by provider] heparin  5,000 Units      meropenem  500 mg Stopped (09/21/23 0552)    NORepinephrine  0-1 mcg/kg/min (Ideal) Stopped (09/20/23 1644)    MD Alert...Adult ICU Electrolyte Replacement per Pharmacy        senna-docusate  2 Tablet      And    polyethylene glycol/lytes  1 Packet      And    magnesium hydroxide  30 mL      And    bisacodyl  10 mg      Respiratory Therapy Consult        labetalol  10 mg      ondansetron  4 mg      ondansetron  4 mg      memantine  5 mg      omeprazole  20 mg      oxybutynin SR  5 mg      tamsulosin  0.4 mg            Assessment and Plan:  * Septic shock (HCC)  Assessment & Plan  Present on admission  Urinary and blood source  s/p 30 mL/kg IV fluid bolus  Intravenous meropenem started yesterday with improvement in symtoms  Has been stable off norepinephrine now and responsive to fluid boluses.    Bacteremia  Assessment & Plan  GNR in the blood, suspect Klebsiella from urinary source.  He does have ureteral stents in place.  Continue meropenem  Follow-up final culture results and sensitivities  Follow-up repeat surveillance blood culture ordered 9/21  Urology consulted for assessment of whether or not stents will need to come out, appreciate assistance    UTI due to Klebsiella species  Assessment & Plan  Klebsiella in the urine  meropenem  Follow-up sensitivities    Atrial fibrillation (HCC)  Assessment & Plan  Rate controlled, likely reactive in the setting of sepsis, echo with mild pulmonary  pretension, TSH normal  [id number] score 2, will need to discuss with family whether or not patient would tolerate anticoagulation, will hold off for now.      Anemia  Assessment & Plan  Hemoglobin acutely dropped from 10.4-8.5 today, Likely dilutional anemia after patient received IV fluids yesterday in the setting of septic shock  Repeat hemoglobin this afternoon    Acute kidney injury (HCC)  Assessment & Plan  He likely has underlying CKD, creatinine improving today after adequate perfusion with Levophed and fluid resuscitation  Bilateral ureteral stents are in place  Monitor renal function and urine output  Avoid nephrotoxins and renal dose medications    Dementia without behavioral disturbance (HCC)  Assessment & Plan  Continue with memantine  Delirium precautions      Quality Measures:  Feeding:  regular  Analgesia: n/a  Sedation: n/a  Thromboprophylaxis: heparin  Head of bed: >30 degrees  Ulcer prophylaxis: home PPI  Glycemic control: n/a  Bowel care: bowel regimen  Indwelling lines: pIV  Deescalation of antibiotics: keep meropenem in setting of ESBL      CODE STATUS:   Full  DISPO:    Ok to downgrade from ICU.

## 2023-09-21 NOTE — PROGRESS NOTES
"Pulmonary Progress Note    Date of admission  9/19/2023    Chief Complaint: Septic shock     History of Present Illness:  \"I was kindly asked to see and evaluate Trev Carl, a 88 y.o. male for evaluation and management of the above problem.     The history is obtained from healthcare providers and the medical record as this gentleman has dementia and cannot provide a reliable history.  This gentleman has a history of dementia and urolithiasis with bilateral ureteral stents in place.  He was admitted on or about 9/19/2023 with acute cystitis from a urinary source.  I was asked to see him today due to persistent hypotension.\" - from Dr Rodriguez' consult     Hospital Course  9/21 - improving overnight, received 500 cc IVF    Interval Problem Update  Chart review from the past 24 hours includes imaging, laboratory studies, vital signs and notes available.  Pertinent data for today's visit includes    Neuro: dementia, A&Ox1  Cardiac: off of levophed, afib 60-90s  Pulm: on 2lpm  Heme: Hg 8.4 from 10.9  /renal: Cr improved, K 3.2  GI: no acute issues    Endo: glucose WNL with no insulin given in last 24 hours   ID:  UTI sepsis, urine and blood + klebsiella, afebrile - on meropenem    I/O: +2477/-1255=+1222    Review of Systems   Unable to perform ROS: Dementia      Vital Signs for last 24 hours   Temp:  [36.6 °C (97.8 °F)-37 °C (98.6 °F)] 36.8 °C (98.2 °F)  Pulse:  [] 71  Resp:  [8-56] 17  BP: ()/() 113/59  SpO2:  [90 %-100 %] 98 %    Hemodynamic parameters for last 24 hours       Respiratory Information for the last 24 hours       Physical Exam  Vitals reviewed.   Constitutional:       Appearance: Normal appearance.   HENT:      Head: Normocephalic.   Cardiovascular:      Rate and Rhythm: Normal rate. Rhythm irregular.   Pulmonary:      Effort: Pulmonary effort is normal.      Breath sounds: Normal breath sounds.   Abdominal:      Palpations: Abdomen is soft.   Skin:     General: Skin is warm " and dry.   Neurological:      Mental Status: Mental status is at baseline.         Medications  Current Facility-Administered Medications   Medication Dose Route Frequency Provider Last Rate Last Admin    potassium chloride SA (Kdur) tablet 40 mEq  40 mEq Oral Q6HR Alfreda Crabtree D.O.   40 mEq at 09/21/23 0825    magnesium sulfate IVPB premix 2 g  2 g Intravenous Once Alfreda Crabtree D.O. 25 mL/hr at 09/21/23 0827 2 g at 09/21/23 0827    dextrose 50% (D50W) injection 25 g  25 g Intravenous Q15 MIN PRN Zee Smiley A.P.R.N.   25 g at 09/20/23 0752    [Held by provider] heparin injection 5,000 Units  5,000 Units Subcutaneous Q8HRS Dinesh Cerda M.D.        meropenem (Merrem) 500 mg in  mL IV-MBP  500 mg Intravenous Q8HRS Dinesh Cerda M.D.   Stopped at 09/21/23 0552    norepinephrine (Levophed) 8 mg in 250 mL NS infusion (premix)  0-1 mcg/kg/min (Ideal) Intravenous Continuous Bunny Rodriguez M.D.   Stopped at 09/20/23 1644    MD Alert...ICU Electrolyte Replacement per Pharmacy   Other PHARMACY TO DOSE Bunny Rodriguez M.D.        senna-docusate (Pericolace Or Senokot S) 8.6-50 MG per tablet 2 Tablet  2 Tablet Oral BID ANTHONY AlvarengaOVeena   2 Tablet at 09/21/23 0524    And    polyethylene glycol/lytes (Miralax) PACKET 1 Packet  1 Packet Oral QDAY PRN Bunny Martínez D.O.        And    magnesium hydroxide (Milk Of Magnesia) suspension 30 mL  30 mL Oral QDAY PRN Bunny Martínez D.O.        And    bisacodyl (Dulcolax) suppository 10 mg  10 mg Rectal QDAY PRN Bunny Martínez D.O.        Respiratory Therapy Consult   Nebulization Continuous RT Bunny Martínez D.O.        labetalol (Normodyne/Trandate) injection 10 mg  10 mg Intravenous Q4HRS PRN Bunny Martínez D.O.        ondansetron (Zofran) syringe/vial injection 4 mg  4 mg Intravenous Q4HRS PRN ANTHONY AlvarengaO.        ondansetron (Zofran ODT) dispertab 4 mg  4 mg Oral Q4HRS PRN ANTHONY AlvarengaO.        memantine (Namenda)  tablet 5 mg  5 mg Oral DAILY DEBORAH Olson.P.R.N.   5 mg at 09/21/23 0524    omeprazole (PriLOSEC) capsule 20 mg  20 mg Oral DAILY DEBORAH Olson.P.R.N.   20 mg at 09/21/23 0524    oxybutynin SR (Ditropan-XL) tablet 5 mg  5 mg Oral DAILY REJI OlsonP.R.N.   5 mg at 09/21/23 0525    tamsulosin (Flomax) capsule 0.4 mg  0.4 mg Oral Q EVENING REJI OlsonP.R.N.   0.4 mg at 09/20/23 1727       Fluids    Intake/Output Summary (Last 24 hours) at 9/21/2023 0848  Last data filed at 9/21/2023 0600  Gross per 24 hour   Intake 2477.2 ml   Output 1255 ml   Net 1222.2 ml       Laboratory          Recent Labs     09/20/23 0143 09/20/23  1508 09/21/23  0405   SODIUM 140 140 143   POTASSIUM 4.0 3.9 3.2*   CHLORIDE 110 109 112   CO2 16* 18* 21   BUN 32* 30* 31*   CREATININE 1.51* 1.54* 1.35   MAGNESIUM  --   --  1.6   PHOSPHORUS  --   --  3.2   CALCIUM 8.1* 7.9* 7.2*     Recent Labs     09/19/23  1150 09/20/23  0143 09/20/23  1508 09/21/23  0405   ALTSGPT 13  --  19  --    ASTSGOT 22  --  30  --    ALKPHOSPHAT 147*  --  183*  --    TBILIRUBIN 0.7  --  0.7  --    GLUCOSE 98 63* 158* 105*     Recent Labs     09/19/23  1150 09/20/23  0143 09/20/23  1508 09/21/23  0405   WBC 16.5* 14.2* 29.2* 12.7*   NEUTSPOLYS 95.80*  --  87.90* 78.90*   LYMPHOCYTES 0.90*  --  5.80* 13.80*   MONOCYTES 1.70  --  2.20 3.20   EOSINOPHILS 0.00  --  0.10 0.80   BASOPHILS 0.00  --  0.60 0.30   ASTSGOT 22  --  30  --    ALTSGPT 13  --  19  --    ALKPHOSPHAT 147*  --  183*  --    TBILIRUBIN 0.7  --  0.7  --      Recent Labs     09/19/23  1150 09/20/23  0143 09/20/23  1508 09/21/23  0405   RBC 3.21* 3.69* 3.75* 2.95*   HEMOGLOBIN 9.3* 10.5* 10.9* 8.4*   HEMATOCRIT 29.3* 33.5* 33.8* 26.8*   PLATELETCT 114* 95* 121* 107*   PROTHROMBTM 17.9*  --   --   --    INR 1.46*  --   --   --        Imaging  Echo:   Reviewed    Assessment/Plan  * Septic shock (HCC)  Assessment & Plan  Present on admission  Urinary and blood source  s/p  30 mL/kg IV fluid bolus  Intravenous meropenem started yesterday with improvement in symtoms  I am titrating norepinephrine to keep mean arterial pressure greater than 65    Atrial fibrillation (HCC)  Assessment & Plan  Rate control, likely due to sepsis, echo with mild pulmonary pretension, TSH normal  [id number] score 2  To my knowledge, this is new onset in the setting of sepsis     Will defer anticoagulation at this time as we are not able to consent him on risk v benefits of AC, will reach out to family to get a better idea of his medical conditions    Bacteremia  Assessment & Plan  GNR in the blood, suspect Klebsiella from urinary source  Continue meropenem  Follow-up final culture results and sensitivities  Follow-up repeat surveillance blood culture    UTI due to Klebsiella species  Assessment & Plan  Klebsiella in the urine  Change ceftriaxone to meropenem  Follow-up sensitivities  We will reach out to urology to discuss patient's existing ureteral stents, unsure if they need to be removed    Anemia  Assessment & Plan  Hemoglobin acutely dropped from 10.4-8.5 today, I suspect that this may be dilutional anemia after patient received IV fluids yesterday in the setting of septic shock  Will repeat hemoglobin at this time    Acute kidney injury (HCC)  Assessment & Plan  He likely has underlying CKD, creatinine improving today after adequate perfusion with Levophed and fluid resuscitation  Bilateral ureteral stents are in place  Monitor renal function and urine output  Avoid nephrotoxins and renal dose medications    Dementia without behavioral disturbance (HCC)- (present on admission)  Assessment & Plan  Continue with memantine  Delirium precautions         Lines: PIVs  Tubes: graham 9/20  Diet: regular    DVT ppx: heparin  GI ppx: omeprazole  Bowel regiment: yes    I have performed a physical exam and reviewed and updated ROS and Plan today (9/21/2023). In review of yesterday's note (9/20/2023), there are no  changes except as documented above.     Discussed patient condition and risk of morbidity and/or mortality with RN, RT, Pharmacy, and [facility] resident    The patient remains critically ill.  Critical care time = 52 minutes in directly providing and coordinating critical care and extensive data review.  No time overlap and excludes procedures.    Alfreda Crabtree, DO   Pulmonary and Critical Care

## 2023-09-21 NOTE — CARE PLAN
Problem: Hemodynamics  Goal: Patient's hemodynamics, fluid balance and neurologic status will be stable or improve  Outcome: Progressing  Note: Patient remains hemodynamically stable without use of Vasopressors.      Problem: Fall Risk  Goal: Patient will remain free from falls  Outcome: Progressing  Note: Fall precautions discussed and reinforced with patient.     Problem: Pain - Standard  Goal: Alleviation of pain or a reduction in pain to the patient’s comfort goal  Outcome: Progressing  Note: Adequate pain control this shift with reported pain level 0.   The patient is Watcher - Medium risk of patient condition declining or worsening    Shift Goals  Clinical Goals: Maintain hemodynamics  Patient Goals: Sleep  Family Goals: MARIVEL    Progress made toward(s) clinical / shift goals:  Progress toward goals as stated above.     Patient is not progressing towards the following goals:

## 2023-09-21 NOTE — PROGRESS NOTES
[facility] Medicine Daily Progress Note    Date of Service  9/21/2023    Chief Complaint  Trev Carl is a 88 y.o. male admitted 9/19/2023 with hypotension    Hospital Course  Mr. Carl is a 88 y.o. male with cognitive decline who presented 9/19/2023 with a urinary tract infection and hypotension from [facility].  Reportedly he had recently been diagnosed with the UTI two days prior and was on oral antibiotics but despite this was found to be hypotensive at the [facility] care facility.  He was given 2L of NS and then sent to [facility] for further care.  The patient has poor recall of recent events but states he normally lives with his grandson but has been at the [facility] recently.  On 9/20 he was transferred to the ICU due to hypotension requiring IV pressors.     Interval Problem Update  9/21: Mr. Joyce was evaluated and examined in the ICU. He is hypotensive though off IV pressors. He does not have a central line. Due to ESBL Klebsiella in urine and blood he is on IV Meropenem. Repeat blood cultures have ordered.     I have discussed this patient's plan of care and discharge plan at IDT rounds today with Case Management, Nursing, Nursing leadership, and other members of the IDT team.    Consultants/Specialty  Critical care    Code Status  Full Code    Disposition  The patient is not medically cleared for discharge to home or a post-acute facility.  Anticipate discharge to: skilled nursing facility    I have placed the appropriate orders for post-discharge needs.    Review of Systems  Review of Systems   Unable to perform ROS: Dementia        Physical Exam  Temp:  [36.7 °C (98.1 °F)-37 °C (98.6 °F)] 36.8 °C (98.2 °F)  Pulse:  [] 74  Resp:  [6-56] 27  BP: ()/() 93/64  SpO2:  [81 %-100 %] 99 %    Physical Exam  Vitals and nursing note reviewed.   Constitutional:       Appearance: He is ill-appearing. He is not toxic-appearing.   Cardiovascular:      Rate and Rhythm: Normal rate and  regular rhythm.   Pulmonary:      Effort: Pulmonary effort is normal.      Breath sounds: Normal breath sounds.   Abdominal:      General: There is no distension.      Tenderness: There is no abdominal tenderness.   Musculoskeletal:      Right lower leg: Edema present.      Left lower leg: Edema present.   Skin:     Comments: Bruises on arms   Neurological:      Mental Status: He is alert.      Comments: Oriented to person  He moves extremities equally   Psychiatric:      Comments: He is in good spirits         Fluids    Intake/Output Summary (Last 24 hours) at 9/21/2023 1228  Last data filed at 9/21/2023 1000  Gross per 24 hour   Intake 655.07 ml   Output 1325 ml   Net -669.93 ml       Laboratory  Recent Labs     09/20/23  0143 09/20/23  1508 09/21/23  0405   WBC 14.2* 29.2* 12.7*   RBC 3.69* 3.75* 2.95*   HEMOGLOBIN 10.5* 10.9* 8.4*   HEMATOCRIT 33.5* 33.8* 26.8*   MCV 90.8 90.1 90.8   MCH 28.5 29.1 28.5   MCHC 31.3* 32.2* 31.3*   RDW 64.0* 63.7* 64.2*   PLATELETCT 95* 121* 107*   MPV 10.7 10.8 10.6     Recent Labs     09/20/23  0143 09/20/23  1508 09/21/23  0405   SODIUM 140 140 143   POTASSIUM 4.0 3.9 3.2*   CHLORIDE 110 109 112   CO2 16* 18* 21   GLUCOSE 63* 158* 105*   BUN 32* 30* 31*   CREATININE 1.51* 1.54* 1.35   CALCIUM 8.1* 7.9* 7.2*     Recent Labs     09/19/23  1150   INR 1.46*               Imaging  EC-ECHOCARDIOGRAM COMPLETE W/O CONT   Final Result      [id number] VIEW   Final Result      1.  Bilateral ureteral stents are in place and appear appropriately positioned.   2.  Nonspecific bowel gas pattern.      DX-CHEST-PORTABLE (1 VIEW)   Final Result      Patchy bilateral airspace opacities suggestive of multifocal pneumonitis.           Assessment/Plan  * Septic shock (HCC)  Assessment & Plan  This is Sepsis Present on admission  SIRS criteria identified on my evaluation include: Tachycardia, with heart rate greater than 90 BPM and Leukocytosis, with WBC greater than 12,000  Clinical indicators of  end organ dysfunction include Hypotension with systolic blood pressure less than 90 or MAP less than 65 he was transferred to the ICU for IV pressors  Source is urinary  Sepsis protocol initiated  Crystalloid Fluid Administration: Fluid resuscitation ordered per standard protocol was given  IV antibiotics as appropriate for source of sepsis IV Meropenem.         Atrial fibrillation (HCC)  Assessment & Plan  Appears new onset and paroxysmal  TSH 0.86  ECHO reveals a normal EF  CHADSvasc 2  Refrain from anticoagulation for now.      Bacteremia  Assessment & Plan  Cultures growing klebsiella likely from urine source  Continue meropenem      Thrombocytopenia (HCC)  Assessment & Plan  Plt:95  Monitor cbc    UTI due to Klebsiella species  Assessment & Plan  From urine culture from 9/17  Also with klebsiella bacteremia  Meropenem    Anemia  Assessment & Plan  Hb 9.6  We do not have any prior records to refer to.  Stop daily labs    Acute kidney injury (HCC)  Assessment & Plan  9/19 BUN:40, Cr:1.74  Creatinine has improved to 1.35 with IV fluids which will now be stopped.    Dementia without behavioral disturbance (HCC)- (present on admission)  Assessment & Plan  Unknown baseline mentation.  Will need family input  memantine         VTE prophylaxis:    Xarelto 10mg daily as prophylaxis      I have performed a physical exam and reviewed and updated ROS and Plan today (9/21/2023). In review of yesterday's note (9/20/2023), there are no changes except as documented above.

## 2023-09-21 NOTE — PROGRESS NOTES
Lactic that was called and reported as 2.5 was actually resulted at 3. Notified MD of increasing value, no new orders at this time.

## 2023-09-21 NOTE — CONSULTS
UROLOGY Consult Note:    LUKE Sr  Date & Time note created:    9/21/2023   2:13 PM     Referring MD:  Dr. Crabtree    Patient ID:   Name:             Trev Carl   YOB: 1935  Age:                 88 y.o.  male   MRN:               [id number]                                                             Reason for Consult:      History of Kidney Stones  Urosepsis  Erika  ESBL UTIs  Bilateral Hydronephrosis  Chronic Bilateral Ureteral Stents    History of Present Illness:    This is a pleasant 88yoM with history of dementia, kidney stones, rUTIs, and incomplete emptying p/w urosepsis with bilateral ureteral stents in place, last exchanged 8/16/2023. Patient presented with urosepsis, urine and blood cultures + ESBL Klebsiella pneumoniae and yeast. KUB (9/19) demonstrated stents in good position. Patient is on meropenum, WBC is declining, 12.7 (29.2) and ESTEE resolving, Cr 1.35 (1.54). He is afebrile, transferred to floor from ICU 9/21.     On chart review, patient had graham placed for ORIF of hip 06/14/23, with GH following thus CTU was ordered 06/18/23 demonstrating large L ureteral stone and mild hydro. S/p L CULTS 06/18/23. Stent removed 06/26/23.     Represented to S ED on 07/27/23 with 2mm distal L ureteral stone and moderate b/l hydro noted. Graham and b/l stents placed 07/28/23 with pus draining from L ureter. +UTI with Klebsiella at that time.    Subsequently, patient was admitted to the ICU for urosepsis in 8/2023. CT 08/11/23 showed both ureteral stents in good position, with persistent bilateral hydronephrosis and no significant interval decompression despite stent placement 07/28/23. Prior cysto stent exchange revealed small capacity bladder with trabeculations. He has a history of elevated PVRs ~140cc in 7/2023. During 8/2023 admission, a graham was placed for elevated residuals (~525cc). Patient had b/l stent exchange with Dr. Irizarry 08/16/23 for UTI at that time, per ID  recommendations. Prior plan was to continue with stents and do urodynamics to further assess bladder function--possible high pressure, neurogenic bladder contributing to retention and hydronephrosis.     Review of Systems:      Unable to assess due to dementia/poor historian.              Past Medical History:   Past Medical History:   Diagnosis Date    Hearing loss      Active Hospital Problems    Diagnosis     Bacteremia [R78.81]     Atrial fibrillation (HCC) [I48.91]     Acute kidney injury (HCC) [N17.9]     Anemia [D64.9]     Septic shock (HCC) [A41.9, R65.21]     UTI due to Klebsiella species [N39.0, B96.89]     Thrombocytopenia (HCC) [D69.6]     Dementia without behavioral disturbance (HCC) [F03.90]        Past Surgical History:  History reviewed. No pertinent surgical history.    Hospital Medications:    Current Facility-Administered Medications:     potassium chloride SA (Kdur) tablet 40 mEq, 40 mEq, Oral, Q6HR, MARISEL Brandt.IVONNE, 40 mEq at 09/21/23 0825    Notify MD and PharmD if FSBG level is less than or equal to 70 mg/dL or patient is showing signs/symptoms of hypoglycemia (tachycardia, palpitations, diaphoresis, clammy, tremulousness, nausea, confused), , , Once **AND** Administer 20 grams of glucose (approximately 8 ounces of fruit juice) every 15 minutes PRN FSBS less than 70 mg/dL, , , PRN **AND** dextrose 50% (D50W) injection 25 g, 25 g, Intravenous, Q15 MIN PRN, Zee Smiley, A.P.R.N., 25 g at 09/20/23 0752    heparin injection 5,000 Units, 5,000 Units, Subcutaneous, Q8HRS, Dinesh Cerda M.D.    meropenem (Merrem) 500 mg in  mL IV-MBP, 500 mg, Intravenous, Q8HRS, Dinesh Cerda M.D., Stopped at 09/21/23 0552    MD Alert...ICU Electrolyte Replacement per Pharmacy, , Other, PHARMACY TO DOSE, Bunny Rodriguez M.D.    senna-docusate (Pericolace Or Senokot S) 8.6-50 MG per tablet 2 Tablet, 2 Tablet, Oral, BID, 2 Tablet at 09/21/23 0524 **AND** polyethylene glycol/lytes  (Miralax) PACKET 1 Packet, 1 Packet, Oral, QDAY PRN **AND** magnesium hydroxide (Milk Of Magnesia) suspension 30 mL, 30 mL, Oral, QDAY PRN **AND** bisacodyl (Dulcolax) suppository 10 mg, 10 mg, Rectal, QDAY PRN, Bunny Martínez D.O.    Respiratory Therapy Consult, , Nebulization, Continuous RT, Bunny Martínez D.O.    labetalol (Normodyne/Trandate) injection 10 mg, 10 mg, Intravenous, Q4HRS PRN, Bunny Martínez D.O.    ondansetron (Zofran) syringe/vial injection 4 mg, 4 mg, Intravenous, Q4HRS PRN, Bunny Martínez D.O.    ondansetron (Zofran ODT) dispertab 4 mg, 4 mg, Oral, Q4HRS PRN, Bunny Martínez D.O.    memantine (Namenda) tablet 5 mg, 5 mg, Oral, DAILY, Zee Smiley A.P.R.N., 5 mg at 09/21/23 0524    omeprazole (PriLOSEC) capsule 20 mg, 20 mg, Oral, DAILY, Zee Smiley A.P.R.N., 20 mg at 09/21/23 0524    oxybutynin SR (Ditropan-XL) tablet 5 mg, 5 mg, Oral, DAILY, Zee Smiley A.P.R.N., 5 mg at 09/21/23 0525    tamsulosin (Flomax) capsule 0.4 mg, 0.4 mg, Oral, Q EVENING, Zee Smiley A.P.R.N., 0.4 mg at 09/20/23 1727    Current Outpatient Medications:  Medications Prior to Admission   Medication Sig Dispense Refill Last Dose    melatonin 3 MG Tab Take 3 mg by mouth at bedtime.   9/18/2023 at 2100    memantine (NAMENDA) 5 MG Tab Take 5 mg by mouth every day.   9/19/2023 at 0800    omeprazole (PRILOSEC) 20 MG delayed-release capsule Take 20 mg by mouth every day.   9/19/2023 at 0800    oxybutynin SR (DITROPAN-XL) 5 MG TABLET SR 24 HR Take 5 mg by mouth every day.   9/19/2023 at 0800    potassium chloride SA (KDUR) 20 MEQ Tab CR Take 60 mEq by mouth every day. 20 meq x 3 tablets = 60 meq   9/19/2023 at 0800    tamsulosin (FLOMAX) 0.4 MG capsule Take 0.4 mg by mouth every evening.   9/18/2023 at 2000    terazosin (HYTRIN) 5 MG Cap Take 5 mg by mouth every evening.   9/18/2023 at 2000    terazosin (HYTRIN) 5 MG Cap Take 5 mg by mouth every day.   9/18/2023 at 0800    senna-docusate  "(PERICOLACE OR SENOKOT S) 8.6-50 MG Tab Take 1 Tablet by mouth 2 times a day.   2023 at 0800    [] Sodium Chloride (NS) Solution Infuse  into a venous catheter continuous.   2023 at continuous    nystatin (MYCOSTATIN) 839269 UNIT/ML Suspension Take 500,000 Units by mouth 4 times a day.   2023 at finished    guaiFENesin (ROBITUSSIN) 100 MG/5ML liquid Take 10 mL by mouth every four hours as needed for Cough.   2023 at finished       Medication Allergy:  No Known Allergies    Family History:  No family history on file.    Social History:  Social History     Socioeconomic History    Marital status:      Spouse name: Not on file    Number of children: Not on file    Years of education: Not on file    Highest education level: Not on file   Occupational History    Not on file   Tobacco Use    Smoking status: Never    Smokeless tobacco: Never   Vaping Use    Vaping Use: Every day   Substance and Sexual Activity    Alcohol use: Not on file    Drug use: Not on file    Sexual activity: Not on file   Other Topics Concern    Not on file   Social History Narrative    Not on file     Social Determinants of Health     Financial Resource Strain: Not on file   Food Insecurity: Not on file   Transportation Needs: Not on file   Physical Activity: Not on file   Stress: Not on file   Social Connections: Not on file   Intimate Partner Violence: Not on file   Housing Stability: Not on file         Physical Exam:  Vitals/ General Appearance:   Weight/BMI: Body mass index is 25.56 kg/m².  BP 93/64   Pulse 74   Temp 36.7 °C (98 °F) (Temporal)   Resp (!) 27   Ht 1.778 m (5' 10\")   Wt 80.8 kg (178 lb 2.1 oz)   SpO2 99%   Vitals:    23 0800 23 0900 23 1000 23 1200   BP: 105/72 100/58 93/64    Pulse: 68 (!) 105 74    Resp: 19 (!) 28 (!) 27    Temp: 36.8 °C (98.2 °F)   36.7 °C (98 °F)   TempSrc: Temporal   Temporal   SpO2: 98% (!) 81% 99%    Weight:       Height:         Oxygen " Therapy:  Pulse Oximetry: 99 %, O2 (LPM): 0, O2 Delivery Device: None - Room Air    Constitutional:   Well developed, Well nourished, No acute distress  HENMT:  Normocephalic, Atraumatic,.  Eyes:  EOMI, Conjunctiva normal, No discharge.  Lungs:  Normal effort.   : -CVAT. [place] in p0lace draining cloudy ight yellow urine in bag.   Skin: Warm, Dry, No erythema, No rash, no induration.  Neurologic: Alert & oriented x 3, No focal deficits noted.  Psychiatric: Affect normal, Judgment normal, Mood normal.      MDM (Data Review):     Records reviewed and summarized in current documentation    Lab Data Review:  Recent Results (from the past 24 hour(s))   LACTIC ACID    Collection Time: 09/20/23  3:08 PM   Result Value Ref Range    Lactic Acid 2.5 (H) 0.5 - 2.0 mmol/L   CBC WITH DIFFERENTIAL    Collection Time: 09/20/23  3:08 PM   Result Value Ref Range    WBC 29.2 (H) 4.8 - 10.8 K/uL    RBC 3.75 (L) 4.70 - 6.10 M/uL    Hemoglobin 10.9 (L) 14.0 - 18.0 g/dL    Hematocrit 33.8 (L) 42.0 - 52.0 %    MCV 90.1 81.4 - 97.8 fL    MCH 29.1 27.0 - 33.0 pg    MCHC 32.2 (L) 32.3 - 36.5 g/dL    RDW 63.7 (H) 35.9 - 50.0 fL    Platelet Count 121 (L) 164 - 446 K/uL    MPV 10.8 9.0 - 12.9 fL    Neutrophils-Polys 87.90 (H) 44.00 - 72.00 %    Lymphocytes 5.80 (L) 22.00 - 41.00 %    Monocytes 2.20 0.00 - 13.40 %    Eosinophils 0.10 0.00 - 6.90 %    Basophils 0.60 0.00 - 1.80 %    Immature Granulocytes 3.40 (H) 0.00 - 0.90 %    Nucleated RBC 0.00 0.00 - 0.20 /100 WBC    Neutrophils (Absolute) 25.65 (H) 1.82 - 7.42 K/uL    Lymphs (Absolute) 1.70 1.00 - 4.80 K/uL    Monos (Absolute) 0.64 0.00 - 0.85 K/uL    Eos (Absolute) 0.03 0.00 - 0.51 K/uL    Baso (Absolute) 0.18 (H) 0.00 - 0.12 K/uL    Immature Granulocytes (abs) 1.00 (H) 0.00 - 0.11 K/uL    NRBC (Absolute) 0.00 K/uL   Comp Metabolic Panel    Collection Time: 09/20/23  3:08 PM   Result Value Ref Range    Sodium 140 135 - 145 mmol/L    Potassium 3.9 3.6 - 5.5 mmol/L    Chloride 109 96 - 112  mmol/L    Co2 18 (L) 20 - 33 mmol/L    Anion Gap 13.0 7.0 - 16.0    Glucose 158 (H) 65 - 99 mg/dL    Bun 30 (H) 8 - 22 mg/dL    Creatinine 1.54 (H) 0.50 - 1.40 mg/dL    Calcium 7.9 (L) 8.5 - 10.5 mg/dL    Correct Calcium 9.3 8.5 - 10.5 mg/dL    AST(SGOT) 30 12 - 45 U/L    ALT(SGPT) 19 2 - 50 U/L    Alkaline Phosphatase 183 (H) 30 - 99 U/L    Total Bilirubin 0.7 0.1 - 1.5 mg/dL    Albumin 2.3 (L) 3.2 - 4.9 g/dL    Total Protein 5.2 (L) 6.0 - 8.2 g/dL    Globulin 2.9 1.9 - 3.5 g/dL    A-G Ratio 0.8 g/dL   ESTIMATED GFR    Collection Time: 09/20/23  3:08 PM   Result Value Ref Range    GFR (CKD-EPI) 43 (A) >60 mL/min/1.73 m 2   TSH WITH REFLEX TO FT4    Collection Time: 09/20/23  5:25 PM   Result Value Ref Range    TSH 0.860 0.380 - 5.330 uIU/mL   Lactic Acid Every four hours after STAT order    Collection Time: 09/20/23  5:25 PM   Result Value Ref Range    Lactic Acid 2.8 (H) 0.5 - 2.0 mmol/L   Lactic Acid Every four hours after STAT order    Collection Time: 09/20/23 10:45 PM   Result Value Ref Range    Lactic Acid 3.0 (H) 0.5 - 2.0 mmol/L   Lactic Acid -STAT Once    Collection Time: 09/20/23 11:45 PM   Result Value Ref Range    Lactic Acid 2.4 (H) 0.5 - 2.0 mmol/L   CBC WITH DIFFERENTIAL    Collection Time: 09/21/23  4:05 AM   Result Value Ref Range    WBC 12.7 (H) 4.8 - 10.8 K/uL    RBC 2.95 (L) 4.70 - 6.10 M/uL    Hemoglobin 8.4 (L) 14.0 - 18.0 g/dL    Hematocrit 26.8 (L) 42.0 - 52.0 %    MCV 90.8 81.4 - 97.8 fL    MCH 28.5 27.0 - 33.0 pg    MCHC 31.3 (L) 32.3 - 36.5 g/dL    RDW 64.2 (H) 35.9 - 50.0 fL    Platelet Count 107 (L) 164 - 446 K/uL    MPV 10.6 9.0 - 12.9 fL    Neutrophils-Polys 78.90 (H) 44.00 - 72.00 %    Lymphocytes 13.80 (L) 22.00 - 41.00 %    Monocytes 3.20 0.00 - 13.40 %    Eosinophils 0.80 0.00 - 6.90 %    Basophils 0.30 0.00 - 1.80 %    Immature Granulocytes 3.00 (H) 0.00 - 0.90 %    Nucleated RBC 0.00 0.00 - 0.20 /100 WBC    Neutrophils (Absolute) 10.03 (H) 1.82 - 7.42 K/uL    Lymphs (Absolute)  1.76 1.00 - 4.80 K/uL    Monos (Absolute) 0.41 0.00 - 0.85 K/uL    Eos (Absolute) 0.10 0.00 - 0.51 K/uL    Baso (Absolute) 0.04 0.00 - 0.12 K/uL    Immature Granulocytes (abs) 0.38 (H) 0.00 - 0.11 K/uL    NRBC (Absolute) 0.00 K/uL   Basic Metabolic Panel    Collection Time: 09/21/23  4:05 AM   Result Value Ref Range    Sodium 143 135 - 145 mmol/L    Potassium 3.2 (L) 3.6 - 5.5 mmol/L    Chloride 112 96 - 112 mmol/L    Co2 21 20 - 33 mmol/L    Glucose 105 (H) 65 - 99 mg/dL    Bun 31 (H) 8 - 22 mg/dL    Creatinine 1.35 0.50 - 1.40 mg/dL    Calcium 7.2 (L) 8.5 - 10.5 mg/dL    Anion Gap 10.0 7.0 - 16.0   MAGNESIUM    Collection Time: 09/21/23  4:05 AM   Result Value Ref Range    Magnesium 1.6 1.5 - 2.5 mg/dL   PHOSPHORUS    Collection Time: 09/21/23  4:05 AM   Result Value Ref Range    Phosphorus 3.2 2.5 - 4.5 mg/dL   ESTIMATED GFR    Collection Time: 09/21/23  4:05 AM   Result Value Ref Range    GFR (CKD-EPI) 50 (A) >60 mL/min/1.73 m 2   HEMOGLOBIN AND HEMATOCRIT    Collection Time: 09/21/23 12:20 PM   Result Value Ref Range    Hemoglobin 9.6 (L) 14.0 - 18.0 g/dL    Hematocrit 29.7 (L) 42.0 - 52.0 %       Imaging/Procedures Review:    Reviewed    MDM (Assessment and Plan):     History of Kidney Stones  Urosepsis  Erika  ESBL UTIs  Bilateral Hydronephrosis  Chronic Bilateral Ureteral Stents    88yoM w/ hx kidney stones and initially had b/l ureteral stents placed due to infection with unknown source and b/l hydro 7/28/23. Subsequent CT at [facility] (8/11/23) with no evidence of nephrolithiasis and persistent b/l hydro, despite stents in place. B/l stents last exchanged 8/16/23. Previously undergoing work up for high pressure, neurogenic bladder--may be refluxing urine to kidneys and contributing to infections. Here now with urine/blood cultures positive for ESBL K pneumoniae/yeast, on meropenem, improving. KUB (9/19) demonstrating b/l stents in good position.     Plan:  -Continue abx per primary team/culture  sensitivities.  -Continue graham.   -No emergent plans for stent removal/exchange. To consider for worsening clinical picture.   -Urology to follow, appreciate the consult.     Case discussed with Urology, Dr. Lange, who has directed this plan of care.     Yee Wong PA-C  [facility] [place]

## 2023-09-21 NOTE — PROGRESS NOTES
MD rounding and notified of hypotension SBP 80s and possible need to restart Levophed. Per MD administer 500 mL NS bolus.

## 2023-09-22 PROBLEM — E87.29 HYPERCHLOREMIC METABOLIC ACIDOSIS: Status: ACTIVE | Noted: 2023-09-22

## 2023-09-22 LAB
ALBUMIN SERPL BCP-MCNC: 1.9 G/DL (ref 3.2–4.9)
ALBUMIN/GLOB SERPL: 0.7 G/DL
ALP SERPL-CCNC: 187 U/L (ref 30–99)
ALT SERPL-CCNC: 24 U/L (ref 2–50)
ANION GAP SERPL CALC-SCNC: 9 MMOL/L (ref 7–16)
AST SERPL-CCNC: 37 U/L (ref 12–45)
BACTERIA BLD CULT: ABNORMAL
BILIRUB SERPL-MCNC: 0.5 MG/DL (ref 0.1–1.5)
BUN SERPL-MCNC: 26 MG/DL (ref 8–22)
CALCIUM ALBUM COR SERPL-MCNC: 9.3 MG/DL (ref 8.5–10.5)
CALCIUM SERPL-MCNC: 7.6 MG/DL (ref 8.5–10.5)
CHLORIDE SERPL-SCNC: 115 MMOL/L (ref 96–112)
CO2 SERPL-SCNC: 18 MMOL/L (ref 20–33)
CREAT SERPL-MCNC: 1.24 MG/DL (ref 0.5–1.4)
ERYTHROCYTE [DISTWIDTH] IN BLOOD BY AUTOMATED COUNT: 64 FL (ref 35.9–50)
GFR SERPLBLD CREATININE-BSD FMLA CKD-EPI: 56 ML/MIN/1.73 M 2
GLOBULIN SER CALC-MCNC: 2.6 G/DL (ref 1.9–3.5)
GLUCOSE SERPL-MCNC: 90 MG/DL (ref 65–99)
HCT VFR BLD AUTO: 29.4 % (ref 42–52)
HGB BLD-MCNC: 9.3 G/DL (ref 14–18)
LACTATE SERPL-SCNC: 1.6 MMOL/L (ref 0.5–2)
MCH RBC QN AUTO: 28.4 PG (ref 27–33)
MCHC RBC AUTO-ENTMCNC: 31.6 G/DL (ref 32.3–36.5)
MCV RBC AUTO: 89.9 FL (ref 81.4–97.8)
PLATELET # BLD AUTO: 102 K/UL (ref 164–446)
PLATELETS.RETICULATED NFR BLD AUTO: 4.7 % (ref 0.6–13.1)
PMV BLD AUTO: 11.2 FL (ref 9–12.9)
POTASSIUM SERPL-SCNC: 4.2 MMOL/L (ref 3.6–5.5)
PROT SERPL-MCNC: 4.5 G/DL (ref 6–8.2)
RBC # BLD AUTO: 3.27 M/UL (ref 4.7–6.1)
SIGNIFICANT IND 70042: ABNORMAL
SITE SITE: ABNORMAL
SODIUM SERPL-SCNC: 142 MMOL/L (ref 135–145)
SOURCE SOURCE: ABNORMAL
WBC # BLD AUTO: 8.9 K/UL (ref 4.8–10.8)

## 2023-09-22 PROCEDURE — A9270 NON-COVERED ITEM OR SERVICE: HCPCS

## 2023-09-22 PROCEDURE — A9270 NON-COVERED ITEM OR SERVICE: HCPCS | Performed by: HOSPITALIST

## 2023-09-22 PROCEDURE — 700111 HCHG RX REV CODE 636 W/ 250 OVERRIDE (IP)

## 2023-09-22 PROCEDURE — 87040 BLOOD CULTURE FOR BACTERIA: CPT

## 2023-09-22 PROCEDURE — 85055 RETICULATED PLATELET ASSAY: CPT

## 2023-09-22 PROCEDURE — 36415 COLL VENOUS BLD VENIPUNCTURE: CPT

## 2023-09-22 PROCEDURE — 83605 ASSAY OF LACTIC ACID: CPT

## 2023-09-22 PROCEDURE — 99233 SBSQ HOSP IP/OBS HIGH 50: CPT | Mod: GC | Performed by: INTERNAL MEDICINE

## 2023-09-22 PROCEDURE — 700102 HCHG RX REV CODE 250 W/ 637 OVERRIDE(OP): Performed by: HOSPITALIST

## 2023-09-22 PROCEDURE — 99222 1ST HOSP IP/OBS MODERATE 55: CPT | Mod: GC | Performed by: INTERNAL MEDICINE

## 2023-09-22 PROCEDURE — 80053 COMPREHEN METABOLIC PANEL: CPT

## 2023-09-22 PROCEDURE — 700102 HCHG RX REV CODE 250 W/ 637 OVERRIDE(OP)

## 2023-09-22 PROCEDURE — 700105 HCHG RX REV CODE 258

## 2023-09-22 PROCEDURE — A9270 NON-COVERED ITEM OR SERVICE: HCPCS | Mod: JZ

## 2023-09-22 PROCEDURE — 770001 HCHG ROOM/CARE - MED/SURG/GYN PRIV*

## 2023-09-22 PROCEDURE — 85027 COMPLETE CBC AUTOMATED: CPT

## 2023-09-22 PROCEDURE — 700102 HCHG RX REV CODE 250 W/ 637 OVERRIDE(OP): Mod: JZ

## 2023-09-22 RX ORDER — POTASSIUM CHLORIDE 20 MEQ/1
40 TABLET, EXTENDED RELEASE ORAL ONCE
Status: COMPLETED | OUTPATIENT
Start: 2023-09-22 | End: 2023-09-22

## 2023-09-22 RX ORDER — MAGNESIUM SULFATE 1 G/100ML
1 INJECTION INTRAVENOUS ONCE
Status: COMPLETED | OUTPATIENT
Start: 2023-09-22 | End: 2023-09-22

## 2023-09-22 RX ADMIN — MAGNESIUM SULFATE HEPTAHYDRATE 1 G: 1 INJECTION, SOLUTION INTRAVENOUS at 08:16

## 2023-09-22 RX ADMIN — OXYBUTYNIN CHLORIDE 5 MG: 5 TABLET, EXTENDED RELEASE ORAL at 05:45

## 2023-09-22 RX ADMIN — MEMANTINE HYDROCHLORIDE 5 MG: 10 TABLET ORAL at 05:45

## 2023-09-22 RX ADMIN — TAMSULOSIN HYDROCHLORIDE 0.4 MG: 0.4 CAPSULE ORAL at 17:55

## 2023-09-22 RX ADMIN — OMEPRAZOLE 20 MG: 20 CAPSULE, DELAYED RELEASE ORAL at 05:45

## 2023-09-22 RX ADMIN — SENNOSIDES AND DOCUSATE SODIUM 2 TABLET: 50; 8.6 TABLET ORAL at 17:55

## 2023-09-22 RX ADMIN — MEROPENEM 500 MG: 500 INJECTION, POWDER, FOR SOLUTION INTRAVENOUS at 05:44

## 2023-09-22 RX ADMIN — MEROPENEM 500 MG: 500 INJECTION, POWDER, FOR SOLUTION INTRAVENOUS at 14:58

## 2023-09-22 RX ADMIN — POTASSIUM CHLORIDE 40 MEQ: 1500 TABLET, EXTENDED RELEASE ORAL at 08:12

## 2023-09-22 RX ADMIN — MEROPENEM 500 MG: 500 INJECTION, POWDER, FOR SOLUTION INTRAVENOUS at 22:25

## 2023-09-22 RX ADMIN — RIVAROXABAN 10 MG: 10 TABLET, FILM COATED ORAL at 17:55

## 2023-09-22 ASSESSMENT — ENCOUNTER SYMPTOMS
DIZZINESS: 0
DIARRHEA: 0
WEAKNESS: 1
BLOOD IN STOOL: 0
SPUTUM PRODUCTION: 0
NAUSEA: 0
DOUBLE VISION: 0
VOMITING: 0
CHILLS: 0
SHORTNESS OF BREATH: 0
COUGH: 0
HEMOPTYSIS: 0
SPEECH CHANGE: 0
WEIGHT LOSS: 0
ORTHOPNEA: 0
BLURRED VISION: 0
MEMORY LOSS: 0
PALPITATIONS: 0
HEADACHES: 0
FOCAL WEAKNESS: 0
ABDOMINAL PAIN: 0
FEVER: 0

## 2023-09-22 ASSESSMENT — PAIN DESCRIPTION - PAIN TYPE
TYPE: ACUTE PAIN
TYPE: ACUTE PAIN

## 2023-09-22 ASSESSMENT — FIBROSIS 4 INDEX: FIB4 SCORE: 5.66

## 2023-09-22 NOTE — CARE PLAN
The patient is Stable - Low risk of patient condition declining or worsening    Shift Goals  Clinical Goals: q2 turns, wound care, IV abx  Patient Goals: rest  Family Goals: MARIVEL      Problem: Knowledge Deficit - Standard  Goal: Patient and family/care givers will demonstrate understanding of plan of care, disease process/condition, diagnostic tests and medications  Outcome: Progressing     Problem: Hemodynamics  Goal: Patient's hemodynamics, fluid balance and neurologic status will be stable or improve  Outcome: Progressing     Problem: Fluid Volume  Goal: Fluid volume balance will be maintained  Outcome: Progressing     Problem: Urinary - Renal Perfusion  Goal: Ability to achieve and maintain adequate renal perfusion and functioning will improve  Outcome: Progressing     Patient still has positive ESBL in blood cultures taken today. Patient has no complaints of pain this shift. I&O monitored q4. Tele discontinued as ordered. Attended to needs. Call light within reach.

## 2023-09-22 NOTE — CONSULTS
"INFECTIOUS DISEASES INPATIENT CONSULT NOTE     Date of Service: 9/22/2023    Consult Requested By: Minh Gutierrez M.D.    Reason for Consultation: Duration of antibiotic therapy for Klebsiella pneumonia ESBL bacteremia.    History of Present Illness:   Trev Carl is a 88 y.o. male with a past medical history significant for dementia, GERD, A-fib, bilateral hydronephrosis, previous history of ESBL UTI admitted 9/19/2023 with concerns for urosepsis, patient not initially requiring pressor support, was found to be hypotensive thought to be secondary to UTI diagnosed at SNF ,however, 2 days prior to his admission, patient with ESTEE and hypokalemia as well as leukocytosis at the SNF which warranted his evaluation in the emergency department.  Patient has baseline dementia though is a poor historian can discuss his symptoms, patient with urinary pain and frequency, patient states he did not have a Granger in place at the SNF, patient states he fell while at the SNF because he felt generally fatigued and dizzy.  Patient denies any fevers, chills, night sweats, abdominal pain, nausea/vomiting/diarrhea/constipation, hematuria, hematemesis, hematochezia or melena, new onset musculoskeletal pain or weakness.  Patient now states he is \"feeling a lot better\" patient is not aware of his situation but states he was able to eat and drink lunch today and is happy to be out of the ICU.  Patient denies any current pain or discomfort, denies any fevers, chills, night sweats, chest pain, palpitations, cough, shortness of breath, nausea/vomiting/diarrhea/constipation, dysuria, frequency, urgency, hematuria, hematochezia, hematemesis, melena, new onset musculoskeletal pain or weakness, headaches, dizziness, or lightheadedness.extensive review of emergency physician notes, hospital medicine notes and consultant notes performed.    Of note, Patient admitted to [facility] on 7/27 to 7/31 for nephrolithiasis, " had bilateral ureteral stents placed on 7/28 with cystoscopy reviewed revealing trabeculations in the bladder, patient with CT scan demonstrating bilateral hydronephrosis, patient initially started on IV ceftriaxone did require Levophed and stent in ICU for further management.    Patient was then admitted again to an [facility] on 8/11 for septic shock secondary to UTI again treated with IV ceftriaxone, patient also had acute hypoxic respiratory failure with 5 L needed to maintain oxygen saturation.  Patient again required ICU admission and was briefly on IV Levophed. Patient with urosepsis, urology was concerned for stent colonization then had repeat cystoscopy with stent exchange on 8/16.Patient seen by infectious disease Dr. Sandhu at [facility], for Pseudomonas pyelonephritis and bacteremia, was originally on cefepime then changed to meropenem due to elevated transaminitis, and was treated through August 30, patient also had evidence of thrush as well as a 5 mm pulmonary nodule incidentally found.  Patient spiked repeat fevers on 8/18, then was found to be COVID-19 positive.  Patient then discharged to SNF, and per ED notes treated with unknown antibiotic 2 days prior to his presentation on 9/19.      All other review of systems reviewed and negative except those documented above in the HPI.     PMH:   Past Medical History:   Diagnosis Date    Hearing loss        PSH:  History reviewed. No pertinent surgical history.    FAMILY HX:  No family history on file.    SOCIAL HX:  Social History     Socioeconomic History    Marital status:      Spouse name: Not on file    Number of children: Not on file    Years of education: Not on file    Highest education level: Not on file   Occupational History    Not on file   Tobacco Use    Smoking status: Never    Smokeless tobacco: Never   Vaping Use    Vaping Use: Every day   Substance and Sexual Activity    Alcohol use: Not on file    Drug use: Not on file    Sexual  activity: Not on file   Other Topics Concern    Not on file   Social History Narrative    Not on file     Social Determinants of Health     Financial Resource Strain: Not on file   Food Insecurity: Not on file   Transportation Needs: Not on file   Physical Activity: Not on file   Stress: Not on file   Social Connections: Not on file   Intimate Partner Violence: Not on file   Housing Stability: Not on file     Social History     Tobacco Use   Smoking Status Never   Smokeless Tobacco Never     Social History     Substance and Sexual Activity   Alcohol Use None       Allergies/Intolerances:  No Known Allergies    History reviewed with the patient     Other Current Medications:    Current Facility-Administered Medications:     rivaroxaban (Xarelto) tablet 10 mg, 10 mg, Oral, DAILY AT 1800, Guille Paulson M.D., 10 mg at 09/21/23 1752    Notify MD and PharmD if FSBG level is less than or equal to 70 mg/dL or patient is showing signs/symptoms of hypoglycemia (tachycardia, palpitations, diaphoresis, clammy, tremulousness, nausea, confused), , , Once **AND** Administer 20 grams of glucose (approximately 8 ounces of fruit juice) every 15 minutes PRN FSBS less than 70 mg/dL, , , PRN **AND** dextrose 50% (D50W) injection 25 g, 25 g, Intravenous, Q15 MIN PRN, Zee Smiley, A.P.R.N., 25 g at 09/20/23 0752    meropenem (Merrem) 500 mg in  mL IV-MBP, 500 mg, Intravenous, Q8HRS, Dinesh Cerda M.D., Stopped at 09/22/23 0614    senna-docusate (Pericolace Or Senokot S) 8.6-50 MG per tablet 2 Tablet, 2 Tablet, Oral, BID, 2 Tablet at 09/21/23 0524 **AND** polyethylene glycol/lytes (Miralax) PACKET 1 Packet, 1 Packet, Oral, QDAY PRN **AND** magnesium hydroxide (Milk Of Magnesia) suspension 30 mL, 30 mL, Oral, QDAY PRN **AND** bisacodyl (Dulcolax) suppository 10 mg, 10 mg, Rectal, QDAY PRN, Bunny Martínez D.O.    Respiratory Therapy Consult, , Nebulization, Continuous RT, Bunny Martínez D.O.    labetalol  "(Normodyne/Trandate) injection 10 mg, 10 mg, Intravenous, Q4HRS PRN, Bunny Martínez D.O.    ondansetron (Zofran) syringe/vial injection 4 mg, 4 mg, Intravenous, Q4HRS PRN, Bunny Martínez D.O.    ondansetron (Zofran ODT) dispertab 4 mg, 4 mg, Oral, Q4HRS PRN, Bunny Martínez D.O.    memantine (Namenda) tablet 5 mg, 5 mg, Oral, DAILY, Zee Smiley A.P.R.N., 5 mg at 23 0545    omeprazole (PriLOSEC) capsule 20 mg, 20 mg, Oral, DAILY, DEBORAH Olson.P.R.N., 20 mg at 23 0545    oxybutynin SR (Ditropan-XL) tablet 5 mg, 5 mg, Oral, DAILY, Zee Smiley A.P.R.N., 5 mg at 23 0545    tamsulosin (Flomax) capsule 0.4 mg, 0.4 mg, Oral, Q EVENING, Zee Smiley A.P.R.N., 0.4 mg at 23 1752      Most Recent Vital Signs:  /63   Pulse 81   Temp 36.2 °C (97.2 °F) (Temporal)   Resp 16   Ht 1.778 m (5' 10\")   Wt 88.2 kg (194 lb 7.1 oz)   SpO2 99%   BMI 27.90 kg/m²   Temp  Av.6 °C (97.8 °F)  Min: 35.9 °C (96.6 °F)  Max: 37.1 °C (98.8 °F)    Physical Exam:  General: well nourished, no diaphoresis, well-appearing, no acute distress  HEENT: sclera anicteric, PERRL, extraocular muscles intact, mucous membranes moist, oropharynx clear and moist, no oral lesions or exudate  Neck: supple, no lymphadenopathy  Chest: CTAB, no rales, rhonchi or wheezes, normal work of breathing.  Cardiac: regular rate and rhythm, normal S1 S2, no murmurs, rubs or gallops  Abdomen: + bowel sounds, soft, non-tender, non-distended, no hepatosplenomegaly  Extremities: WWP, no edema, 2+ pedal pulses  Skin: warm and dry, no rashes or worrisome lesions  Neuro: Alert and oriented times 2, non-focal exam, speech fluent, full range of motion to bilateral upper and lower extremities  Psych: normal mood and behavior, pleasant; memory intact, normal judgement    Pertinent Lab Results:  Recent Labs     23  1508 23  0405 23  0753   WBC 29.2* 12.7* 8.9      Recent Labs     23  1508 " "09/21/23  0405 09/21/23  1220 09/22/23  0753   HEMOGLOBIN 10.9* 8.4* 9.6* 9.3*   HEMATOCRIT 33.8* 26.8* 29.7* 29.4*   MCV 90.1 90.8  --  89.9   MCH 29.1 28.5  --  28.4   PLATELETCT 121* 107*  --  102*         Recent Labs     09/20/23  1508 09/21/23  0405 09/22/23  0753   SODIUM 140 143 142   POTASSIUM 3.9 3.2* 4.2   CHLORIDE 109 112 115*   CO2 18* 21 18*   CREATININE 1.54* 1.35 1.24        Recent Labs     09/20/23  1508 09/22/23  0753   ALBUMIN 2.3* 1.9*        Pertinent Micro:  Results       Procedure Component Value Units Date/Time    BLOOD CULTURE [[id number]] Collected: 09/22/23 1012    Order Status: Sent Specimen: Blood from Peripheral Updated: 09/22/23 1047    Narrative:      Contact  Per Hospital Policy: Only change Specimen Src: to \"Line\" if  specified by physician order.  Release to patient->Immediate    BLOOD CULTURE [[id number]] Collected: 09/22/23 1012    Order Status: Sent Specimen: Blood from Peripheral Updated: 09/22/23 1047    Narrative:      Contact  Per Hospital Policy: Only change Specimen Src: to \"Line\" if  specified by physician order.  Release to patient->Immediate    BLOOD CULTURE [[id number]]  (Abnormal) Collected: 09/19/23 1150    Order Status: Completed Specimen: Blood from Peripheral Updated: 09/22/23 1005     Significant Indicator POS     Source BLD     Site PERIPHERAL     Culture Result Growth detected by Bactec instrument. 09/20/2023  17:52      Klebsiella pneumoniae ESBL  Extended Spectrum Beta-lactamase (ESBL) isolated.  ESBL's may be clinically resistant to therapy with  Penicillins,Cephalosporins or Aztreonam despite  apparent in vitro susceptibility to some of these agents.  The patient requires contact isolation.  Please contact pharmacy or an Infectious Disease Specialist  if you have any questions about appropriate therapy.  See previous culture for sensitivity report.      Narrative:      CALL  Thomas  [facility] tel. [phone],  CALLED  [facility] tel. [phone] 09/22/2023, 10:03, RB PERF. " "RESULTS CALLED  TO:Parish Singletary  Per Hospital Policy: Only change Specimen Src: to \"Line\" if  specified by physician order.  Release to patient->Immediate  No site indicated    BLOOD CULTURE [[id number]]  (Abnormal)  (Susceptibility) Collected: 09/19/23 1241    Order Status: Completed Specimen: Blood from Peripheral Updated: 09/22/23 1004     Significant Indicator POS     Source BLD     Site PERIPHERAL     Culture Result Growth detected by Bactec instrument. 09/20/2023  03:44      Klebsiella pneumoniae ESBL  Extended Spectrum Beta-lactamase (ESBL) isolated.  ESBL's may be clinically resistant to therapy with  Penicillins,Cephalosporins or Aztreonam despite  apparent in vitro susceptibility to some of these agents.  The patient requires contact isolation.  Please contact pharmacy or an Infectious Disease Specialist  if you have any questions about appropriate therapy.      Narrative:      CALL  Thomas  [facility] tel. [phone],  CALLED  [facility] tel. [phone] 09/22/2023, 10:03, RB PERF. RESULTS CALLED  TO:Parish Sanabria RN  Per Hospital Policy: Only change Specimen Src: to \"Line\" if  specified by physician order.  Release to patient->Immediate  Left Hand    Susceptibility       Klebsiella pneumoniae esbl (1)       Antibiotic Interpretation Method Status    Ceftriaxone Resistant LUCIANO Final    Cefazolin Resistant LUCIANO Final    Ciprofloxacin Resistant LUCAINO Final    Cefepime Resistant LUCIANO Final    Cefuroxime Resistant LUCIANO Final    Ampicillin/sulbactam Resistant LUCIANO Final    Ertapenem Sensitive LUCIANO Final    Tobramycin Resistant LUCIANO Final    Gentamicin Resistant LUCIANO Final    Minocycline Sensitive LUCIANO Final    Moxifloxacin Sensitive LUCIANO Final    Pip/Tazobactam Sensitive LUCIANO Final    Trimeth/Sulfa Resistant LUCIANO Final    Tigecycline Sensitive LUCIANO Final                       URINE CULTURE(NEW) [[id number]]  (Abnormal)  (Susceptibility) Collected: 09/19/23 1200    Order Status: Completed Specimen: Urine Updated: 09/21/23 1716 "     Significant Indicator POS     Source UR     Site -     Culture Result -      Klebsiella pneumoniae ESBL  >100,000 cfu/mL  Extended Spectrum Beta-lactamase (ESBL) isolated.  ESBL's may be clinically resistant to therapy with  Penicillins,Cephalosporins or Aztreonam despite  apparent in vitro susceptibility to some of these agents.  The patient requires contact isolation.  Please contact pharmacy or an Infectious Disease Specialist  if you have any questions about appropriate therapy.        Candida albicans  10-50,000 cfu/mL      Narrative:      Release to patient->Immediate  Indication for culture:->Emergency Room Patient  Indication for culture:->Emergency Room Patient    Susceptibility       Klebsiella pneumoniae esbl (1)       Antibiotic Interpretation Method Status    Ceftriaxone Resistant LUCIANO Final    Cefazolin Resistant LUCIANO Final     Breakpoints when Cefazolin is used for therapy of infections  other than uncomplicated UTIs due to Enterobacterales are as  follows:  LUCIANO and Interpretation:  <=2 S  4 I  >=8 R        Ciprofloxacin Resistant LUCIANO Final    Cefepime Resistant LUCIANO Final    Cefuroxime Resistant LUCIANO Final    Ampicillin/sulbactam Resistant LUCIANO Final    Tobramycin Resistant LUCIANO Final    Nitrofurantoin Resistant LUCIANO Final    Gentamicin Resistant LUCIANO Final    Levofloxacin Sensitive LUCIANO Final    Minocycline Sensitive LUCIANO Final    Pip/Tazobactam Sensitive LUCIANO Final    Trimeth/Sulfa Resistant LUCIANO Final    Tigecycline Sensitive LUCIANO Final                       Blood Culture [[id number]]     Order Status: Canceled Specimen: Blood from Peripheral     BLOOD CULTURE [[id number]] Collected: 09/20/23 0654    Order Status: Completed Specimen: Blood from Peripheral Updated: 09/21/23 0755     Significant Indicator NEG     Source BLD     Site PERIPHERAL     Culture Result No Growth  Note: Blood cultures are incubated for 5 days and  are monitored continuously.Positive blood cultures  are called to the RN and reported as  "soon as  they are identified.      Narrative:      Per Hospital Policy: Only change Specimen Src: to \"Line\" if  specified by physician order.  Release to patient->Immediate  Left AC    BLOOD CULTURE [[id number]]  (Abnormal) Collected: 09/20/23 0654    Order Status: Completed Specimen: Blood from Peripheral Updated: 09/21/23 0306     Significant Indicator POS     Source BLD     Site PERIPHERAL     Culture Result Growth detected by Bactec instrument. 09/21/2023  03:05  Gram Stain: Gram negative rods.      Narrative:      Per Hospital Policy: Only change Specimen Src: to \"Line\" if  specified by physician order.  Release to patient->Immediate  Left Hand    URINALYSIS [[id number]]  (Abnormal) Collected: 09/19/23 1200    Order Status: Completed Specimen: Urine Updated: 09/19/23 1300     Color Yellow     Character Turbid     Specific Gravity 1.015     Ph 6.0     Glucose Negative mg/dL      Ketones Negative mg/dL      Protein 100 mg/dL      Bilirubin Negative     Urobilinogen, Urine Normal     Nitrite Positive     Leukocyte Esterase Large     Occult Blood Moderate     Micro Urine Req Microscopic    Narrative:      Release to patient->Immediate  Indication for culture:->Emergency Room Patient    Blood Culture [[id number]] Collected: 09/19/23 0000    Order Status: Canceled Specimen: Other from Peripheral     Blood Culture [[id number]] Collected: 09/19/23 0000    Order Status: Canceled Specimen: Other from Peripheral           No results found for: \"BLOODCULTU\", \"BLDCULT\", \"BCHOLD\"     Studies:  EC-ECHOCARDIOGRAM COMPLETE W/O CONT    Result Date: 9/20/2023  Transthoracic Echo Report Echocardiography Laboratory CONCLUSIONS Normal left ventricular size, thickness, systolic function, and diastolic function. Mildly dilated right ventricle with preserved systolic function. Normal left atrial size. Mild tricuspid regurgitation. No pericardial effusion. No prior study is available for comparison. TOOTIE DOWNING Exam Date:         09/20/2023 "                    11:14 Exam Location:     Inpatient Priority:          Routine Ordering Physician:        ANN MARIE FREEMAN Referring Physician:       [id number], ARPIT MANSFIELD Sonographer:               Coby Toribio [facility] Age:    88     Gender:    M MRN:    [id number] :    [id number] BSA:    2      Ht (in):    70     Wt (lb):    180 Exam Type:     Complete Indications:     Arrhythmia ICD Codes:       427.9 CPT Codes:       47830 BP:   94     /   51     HR:   97 Technical Quality:       Fair MEASUREMENTS  (Male / Female) Normal Values 2D ECHO LV Diastolic Diameter PLAX        3.9 cm                4.2 - 5.9 / 3.9 - 5.3 cm LV Systolic Diameter PLAX         2.7 cm                2.1 - 4.0 cm IVS Diastolic Thickness           0.94 cm               LVPW Diastolic Thickness          1.1 cm                LVOT Diameter                     2.1 cm                Estimated LV Ejection Fraction    60 %                  LV Ejection Fraction MOD 4C       59.3 %                LV Ejection Fraction MOD 2C       82.8 %                LV Ejection Fraction 4C AL        60 %                  LV Ejection Fraction 2C AL        83.6 %                LA Volume Index                   13.4 cm3/m2           16 - 28 cm3/m2 DOPPLER AV Peak Velocity                  1.1 m/s               AV Peak Gradient                  5 mmHg                AV Mean Gradient                  3 mmHg                LVOT Peak Velocity                0.81 m/s              AV Area Cont Eq vti               2.2 cm2               Mitral E Point Velocity           0.5 m/s               Mitral E to A Ratio               0.71                  MV Pressure Half Time             40 ms                 MV Area PHT                       5.5 cm2               MV Deceleration Time              137 ms                TR Peak Velocity                  268 cm/s              PV Peak Velocity                  0.86 m/s               PV Peak Gradient                  2.9 mmHg              RVOT Peak Velocity                0.58 m/s              LV E' Lateral Velocity            7.1 cm/s              Mitral E to LV E' Lateral Ratio   7.1                   LV E' Septal Velocity             8.4 cm/s              Mitral E to LV E' Septal Ratio    6                     * Indicates values subject to auto-interpretation LV EF:  60    % FINDINGS Left Ventricle Normal left ventricular size, thickness, systolic function, and diastolic function. Normal regional wall motion. The ejection fraction is measured to be 60 % by Adam's biplane. Right Ventricle Mildly dilated right ventricle. Normal right ventricular systolic function. Right Atrium Enlarged right atrium. Normal inferior vena cava size and inspiratory collapse. Left Atrium Normal left atrial size. Left atrial volume index is 13 mL/sq m. Mitral Valve Structurally normal mitral valve. Mild mitral regurgitation. No mitral stenosis. Aortic Valve Tricuspid aortic valve. Structurally normal aortic valve without significant stenosis or regurgitation. Tricuspid Valve Structurally normal tricuspid valve. Mild tricuspid regurgitation. Right atrial pressure is estimated to be 3 mmHg. Estimated right ventricular systolic pressure is 35 mmHg. No tricuspid stenosis. Pulmonic Valve Structurally normal pulmonic valve without significant stenosis or regurgitation. Pericardium No pericardial effusion. Aorta Normal aortic root for body surface area. The ascending aorta diameter is  3.4 cm. Brant Ware MD (Electronically Signed) Final Date:     20 September 2023                 12:08    [id number] VIEW    Result Date: 9/19/2023 9/19/2023 11:22 AM HISTORY/REASON FOR EXAM:  h/o ureteral stents eval for positioning. TECHNIQUE/EXAM DESCRIPTION AND NUMBER OF VIEWS:  1 view(s) of the abdomen. COMPARISON: None FINDINGS: A nonspecific bowel gas pattern is present. Bilateral ureteral stents are in place and  appear appropriately positioned. There are no abnormal abdominal calcifications. The visualized portions of the lung bases and cardiomediastinal silhouette are unremarkable. Imaged osseous structures are unremarkable.     1.  Bilateral ureteral stents are in place and appear appropriately positioned. 2.  Nonspecific bowel gas pattern.    DX-CHEST-PORTABLE (1 VIEW)    Result Date: 9/19/2023 9/19/2023 11:22 AM HISTORY/REASON FOR EXAM:  Sepsis; sepsis. TECHNIQUE/EXAM DESCRIPTION AND NUMBER OF VIEWS: Single portable view of the chest. COMPARISON: None FINDINGS: The soft tissues and bony structures are unremarkable. The heart and mediastinal structures are within normal limits. Pulmonary vascularity is normal. There are patchy bilateral airspace opacities suggestive of multifocal pneumonitis. There is no effusion or pneumothorax.     Patchy bilateral airspace opacities suggestive of multifocal pneumonitis.      IMPRESSION:   1.  Klebsiella pneumonia ESBL bacteremia   2.  Urosepsis complicated by bilateral hydronephrosis complicated by ureteral stents in place    PLAN:   Trev Carl is a 88 y.o. presenting with ESBL Klebsiella pneumonia urosepsis leading to bacteremia, brief ICU stay, ureteral stents bilateral in place, patient currently hemodynamically stable, leukocytosis downtrending with WBC 16.5 thousand down to 8.9 thousand, repeat blood cultures from 9/20 positive for Klebsiella pneumonia, primary team ordered repeat blood cultures though stable on 9/2022 currently pending. Urine cultures demonstrating Klebsiella pneumonia ESBL and 10 to 50,000 units of Candida albicans.  Patient with 9/17 UC resistant to levofloxacin not Moxifloxacin, discussed with pharmacy would not use, minocycline would remain our only oral option, will likely need chronic suppressive therapy given hx of recurrent UTIs with bl ureteral stents and graham in place.   --Pending repeat blood cultures from 9/22,   -7 days of meropenum followed  by 100mg minocycline BID as chronic suppressive therapy if repeat blood cultures remain negative and clinical course continues to improve.     Plan of care discussed with IM Minh Gutierrez M.D, Duyen Mallory M.D. and . Dinesh Cerda. Will continue to follow    Dennis Marie M.D. IM Resident  Anupam Wu M.D.  Attending     Please note that this dictation was created using voice recognition software. I have worked with technical experts from [facility] to optimize the interface.  I have made every reasonable attempt to correct obvious errors, but there may be errors of grammar and possibly content that I did not discover before finalizing the note.    Patient/Caregiver provided printed discharge information.

## 2023-09-22 NOTE — PROGRESS NOTES
[facility] Daily Progress Note    Date of Service  9/22/2023    [facility] Team: R IM Purple Team   Attending: Minh Gutierrez M.d.  Senior Resident: Dr. Duyen chandler   Intern:  Dr. Dinesh barbosa  Contact Number: [phone]    Chief Complaint  Trev Carl is a 88 y.o. male admitted 9/19/2023 with UTI complicated with sepsis.     Hospital Course  No notes on file      Interval Problem Update  -States improvement in symptoms but still at baseline dementia.  -Blood pressure is in desirable range.   -ID consult placed for duration of meropenem.  -PT/OT ordered.  -patient family was called to discuss baseline mentation- grandson says this is normal for his underlying dementia.  -[facility] was called to make sure of anticoagulation- says he was not on anticoagulation before.     Consultants/Specialty  critical care    Code Status  Full Code    Disposition  The patient is not medically cleared for discharge to home or a post-acute facility.    I have placed the appropriate orders for post-discharge needs.    Review of Systems  Review of Systems   Constitutional:  Negative for chills, fever and weight loss.   HENT:  Negative for ear discharge, ear pain, nosebleeds and tinnitus.    Eyes:  Negative for blurred vision and double vision.   Respiratory:  Negative for cough, hemoptysis, sputum production and shortness of breath.    Cardiovascular:  Negative for chest pain, palpitations, orthopnea and leg swelling.   Gastrointestinal:  Negative for abdominal pain, blood in stool, diarrhea, nausea and vomiting.   Genitourinary:  Negative for dysuria and hematuria.   Neurological:  Positive for weakness. Negative for dizziness, speech change, focal weakness and headaches.   Psychiatric/Behavioral:  Negative for memory loss.         Physical Exam  Temp:  [36.1 °C (97 °F)-37.1 °C (98.8 °F)] 36.3 °C (97.4 °F)  Pulse:  [70-88] 70  Resp:  [16-20] 16  BP: ()/(58-71) 97/59  SpO2:  [95 %-99 %] 99 %    Physical  Exam  Constitutional:       Appearance: He is ill-appearing.      Comments: Not oriented to place and situation.   HENT:      Head: Normocephalic and atraumatic.      Mouth/Throat:      Mouth: Mucous membranes are dry.      Pharynx: Oropharynx is clear.   Eyes:      Extraocular Movements: Extraocular movements intact.      Pupils: Pupils are equal, round, and reactive to light.   Cardiovascular:      Rate and Rhythm: Normal rate. Rhythm irregular.      Heart sounds: Normal heart sounds.   Pulmonary:      Effort: Pulmonary effort is normal. No respiratory distress.      Breath sounds: Normal breath sounds. No rales.   Abdominal:      General: There is no distension.      Palpations: Abdomen is soft.      Tenderness: There is no abdominal tenderness. There is no right CVA tenderness or left CVA tenderness.   Musculoskeletal:         General: Normal range of motion.      Cervical back: Normal range of motion.      Comments: B/L hand tremors on outstretched hands   Skin:     General: Skin is warm.      Capillary Refill: Capillary refill takes 2 to 3 seconds.   Neurological:      Comments: Baseline dementia   Psychiatric:      Comments: Abnormal judgement        Fluids    Intake/Output Summary (Last 24 hours) at 9/22/2023 1122  Last data filed at 9/22/2023 0800  Gross per 24 hour   Intake 690 ml   Output 1600 ml   Net -910 ml         Laboratory  Recent Labs     09/20/23  1508 09/21/23  0405 09/21/23  1220 09/22/23  0753   WBC 29.2* 12.7*  --  8.9   RBC 3.75* 2.95*  --  3.27*   HEMOGLOBIN 10.9* 8.4* 9.6* 9.3*   HEMATOCRIT 33.8* 26.8* 29.7* 29.4*   MCV 90.1 90.8  --  89.9   MCH 29.1 28.5  --  28.4   MCHC 32.2* 31.3*  --  31.6*   RDW 63.7* 64.2*  --  64.0*   PLATELETCT 121* 107*  --  102*   MPV 10.8 10.6  --  11.2       Recent Labs     09/20/23  1508 09/21/23  0405 09/22/23  0753   SODIUM 140 143 142   POTASSIUM 3.9 3.2* 4.2   CHLORIDE 109 112 115*   CO2 18* 21 18*   GLUCOSE 158* 105* 90   BUN 30* 31* 26*   CREATININE 1.54*  1.35 1.24   CALCIUM 7.9* 7.2* 7.6*       Recent Labs     09/19/23  1150   INR 1.46*                 Imaging  EC-ECHOCARDIOGRAM COMPLETE W/O CONT   Final Result      [id number] VIEW   Final Result      1.  Bilateral ureteral stents are in place and appear appropriately positioned.   2.  Nonspecific bowel gas pattern.      DX-CHEST-PORTABLE (1 VIEW)   Final Result      Patchy bilateral airspace opacities suggestive of multifocal pneumonitis.             Assessment/Plan  Problem Representation:    * Septic shock (HCC)  Assessment & Plan  This is Sepsis Present on admission  SIRS criteria identified on my evaluation include: Tachycardia, with heart rate greater than 90 BPM and Leukocytosis, with WBC greater than 12,000  Clinical indicators of end organ dysfunction include Hypotension with systolic blood pressure less than 90 or MAP less than 65 he was transferred to the ICU for IV pressors  Resolved  Source is urinary stents.  Sepsis protocol was initiated  Crystalloid Fluid Administration: Fluid resuscitation ordered per standard protocol was given.  Patient was transferred to IMCU for vasopressors. Bounced back to medical floor on 09/22/23 after blood pressure was normalized.   IV antibiotics as appropriate for source of sepsis IV Meropenem- duration to be decided by ID- appreciate consultation        Bacteremia  Assessment & Plan  Urine Cultures growing ESBL klebsiella.  Continue IV meropenem- ID to decide duration      Urinary tract infection due to ESBL Klebsiella  Assessment & Plan  From urine culture from 9/17 grew ESBL  klebsiella bacteremia  IV Meropenem to be continued- duration to be decided by ID- appreciate consultation.    Atrial fibrillation (HCC)  Assessment & Plan  Rate controlled, likely reactive in the setting of sepsis, echo with mild pulmonary pretension, TSH normal  [id number] score 2, will need to discuss with family whether or not patient would tolerate anticoagulation-Currently on xorelto.        Thrombocytopenia (HCC)  Assessment & Plan  Platelets improving 102  Monitor cbc and signs of mucocutaneous bleeding.     Dementia without behavioral disturbance (HCC)- (present on admission)  Assessment & Plan  Unknown baseline mentation.  Will need family input/SNF  Continue memantine  PT/OT     Hyperchloremic metabolic acidosis  Assessment & Plan  2/2 Sepsis and aggressive NS infusion  Switch to LR for fluid resuscitation if needed.     Anemia  Assessment & Plan  Chronic stable anemia. No signs of active bleed. Denies lightheadedness,dyspnoe likely dilutional anemia.   F/u CBC    Acute kidney injury (HCC)  Assessment & Plan  2/2 to septic shock and third spacing of fluids  Bun improving 26, Creatinine at baseline.  F/u renal functions.           VTE prophylaxis: SCDs/TEDs    I have performed a physical exam and reviewed and updated ROS and Plan today (9/22/2023). In review of yesterday's note (9/21/2023), there are no changes except as documented above.

## 2023-09-22 NOTE — PROGRESS NOTES
4 Eyes Skin Assessment Completed by LUCY Lugo and LUCY Harden.    Head WDL  Ears WDL  Nose WDL  Mouth WDL  Neck WDL  Breast/Chest WDL  Shoulder Blades WDL  Spine WDL  (R) Arm/Elbow/Hand Bruising  (L) Arm/Elbow/Hand Bruising  Abdomen WDL  Groin Redness  Scrotum/Coccyx/Buttocks Excoriation  (R) Leg WDL  (L) Leg WDL  (R) Heel/Foot/Toe WDL  (L) Heel/Foot/Toe WDL          Devices In Places Tele Box, folley      Interventions In Place Heel Mepilex, barrier cream    Possible Skin Injury Yes    Pictures Uploaded Into Epic Yes  Wound Consult Placed Yes  RN Wound Prevention Protocol Ordered Yes

## 2023-09-22 NOTE — PROGRESS NOTES
UROLOGY Progress Note:    Patient ID:  Name:             Trev Carl                   1935  Age:                 88 y.o.  male   MRN:               [id number]                                                             Reason for Consult:      History of Kidney Stones  Urosepsis  Erika  ESBL UTIs  Bilateral Hydronephrosis  Chronic Bilateral Ureteral Stents    History of Present Illness:    This is a pleasant 88yoM with history of dementia, kidney stones, rUTIs, and incomplete emptying p/w urosepsis with bilateral ureteral stents in place, last exchanged 2023. Patient presented with urosepsis, urine and blood cultures + ESBL Klebsiella pneumoniae and yeast. KUB () demonstrated stents in good position. Patient is on meropenum, WBC is declining, 12.7 (29.2) and ESTEE resolving, Cr 1.35 (1.54). He is afebrile, transferred to floor from ICU .     On chart review, patient had graham placed for ORIF of hip 23, with GH following thus CTU was ordered 23 demonstrating large L ureteral stone and mild hydro. S/p L CULTS 23. Stent removed 23.     Represented to [facility] ED on 23 with 2mm distal L ureteral stone and moderate b/l hydro noted. Graham and b/l stents placed 23 with pus draining from L ureter. +UTI with Klebsiella at that time.    Subsequently, patient was admitted to the ICU for urosepsis in 2023. CT 23 showed both ureteral stents in good position, with persistent bilateral hydronephrosis and no significant interval decompression despite stent placement 23. Prior cysto stent exchange revealed small capacity bladder with trabeculations. He has a history of elevated PVRs ~140cc in 2023. During 2023 admission, a graham was placed for elevated residuals (~525cc). Patient had b/l stent exchange with Dr. Irizarry 23 for UTI at that time, per ID recommendations. Prior plan was to continue with stents and do urodynamics to further assess bladder  function--possible high pressure, neurogenic bladder contributing to retention and hydronephrosis.       Interval Updates:    9/22. Patient appears to be clinically improving; labs, including WBC and creatinine, improved. AFVSS.       Review of Systems:      Unable to assess due to dementia/poor historian.              Past Medical History:   Past Medical History:   Diagnosis Date    Hearing loss      Active Hospital Problems    Diagnosis     Hyperchloremic metabolic acidosis [E87.29]     Bacteremia [R78.81]     Atrial fibrillation (HCC) [I48.91]     Acute kidney injury (HCC) [N17.9]     Anemia [D64.9]     Septic shock (HCC) [A41.9, R65.21]     Urinary tract infection due to ESBL Klebsiella [N39.0, B96.89]     Thrombocytopenia (HCC) [D69.6]     Dementia without behavioral disturbance (HCC) [F03.90]        Past Surgical History:  History reviewed. No pertinent surgical history.    Hospital Medications:    Current Facility-Administered Medications:     rivaroxaban (Xarelto) tablet 10 mg, 10 mg, Oral, DAILY AT 1800, Guille Paulson M.D., 10 mg at 09/21/23 1752    Notify MD and PharmD if FSBG level is less than or equal to 70 mg/dL or patient is showing signs/symptoms of hypoglycemia (tachycardia, palpitations, diaphoresis, clammy, tremulousness, nausea, confused), , , Once **AND** Administer 20 grams of glucose (approximately 8 ounces of fruit juice) every 15 minutes PRN FSBS less than 70 mg/dL, , , PRN **AND** dextrose 50% (D50W) injection 25 g, 25 g, Intravenous, Q15 MIN PRN, REJI OlsonPVeenaRVeenaN., 25 g at 09/20/23 0752    meropenem (Merrem) 500 mg in  mL IV-MBP, 500 mg, Intravenous, Q8HRS, Dinesh Cerda M.D., Stopped at 09/22/23 0614    senna-docusate (Pericolace Or Senokot S) 8.6-50 MG per tablet 2 Tablet, 2 Tablet, Oral, BID, 2 Tablet at 09/21/23 0524 **AND** polyethylene glycol/lytes (Miralax) PACKET 1 Packet, 1 Packet, Oral, QDAY PRN **AND** magnesium hydroxide (Milk Of Magnesia) suspension 30 mL,  30 mL, Oral, QDAY PRN **AND** bisacodyl (Dulcolax) suppository 10 mg, 10 mg, Rectal, QDAY PRN, Bunny Martínez D.O.    Respiratory Therapy Consult, , Nebulization, Continuous RT, Bunny Martínez D.O.    labetalol (Normodyne/Trandate) injection 10 mg, 10 mg, Intravenous, Q4HRS PRN, Bunny Martínez D.O.    ondansetron (Zofran) syringe/vial injection 4 mg, 4 mg, Intravenous, Q4HRS PRN, Bunny Martínez D.O.    ondansetron (Zofran ODT) dispertab 4 mg, 4 mg, Oral, Q4HRS PRN, Bunny Martínez D.O.    memantine (Namenda) tablet 5 mg, 5 mg, Oral, DAILY, Zee Smiley A.P.R.N., 5 mg at 23 0545    omeprazole (PriLOSEC) capsule 20 mg, 20 mg, Oral, DAILY, Zee Smiley A.P.R.N., 20 mg at 23 0545    oxybutynin SR (Ditropan-XL) tablet 5 mg, 5 mg, Oral, DAILY, Zee Smiley A.P.R.N., 5 mg at 23 0545    tamsulosin (Flomax) capsule 0.4 mg, 0.4 mg, Oral, Q EVENING, Zee Smiley A.P.R.N., 0.4 mg at 23 9972    Current Outpatient Medications:  Medications Prior to Admission   Medication Sig Dispense Refill Last Dose    melatonin 3 MG Tab Take 3 mg by mouth at bedtime.   2023 at 2100    memantine (NAMENDA) 5 MG Tab Take 5 mg by mouth every day.   2023 at 0800    omeprazole (PRILOSEC) 20 MG delayed-release capsule Take 20 mg by mouth every day.   2023 at 0800    oxybutynin SR (DITROPAN-XL) 5 MG TABLET SR 24 HR Take 5 mg by mouth every day.   2023 at 0800    potassium chloride SA (KDUR) 20 MEQ Tab CR Take 60 mEq by mouth every day. 20 meq x 3 tablets = 60 meq   2023 at 0800    tamsulosin (FLOMAX) 0.4 MG capsule Take 0.4 mg by mouth every evening.   2023 at     terazosin (HYTRIN) 5 MG Cap Take 5 mg by mouth every evening.   2023 at     terazosin (HYTRIN) 5 MG Cap Take 5 mg by mouth every day.   2023 at 0800    senna-docusate (PERICOLACE OR SENOKOT S) 8.6-50 MG Tab Take 1 Tablet by mouth 2 times a day.   2023 at 0800    [] Sodium  "Chloride (NS) Solution Infuse  into a venous catheter continuous.   9/19/2023 at continuous    nystatin (MYCOSTATIN) 712007 UNIT/ML Suspension Take 500,000 Units by mouth 4 times a day.   9/6/2023 at finished    guaiFENesin (ROBITUSSIN) 100 MG/5ML liquid Take 10 mL by mouth every four hours as needed for Cough.   9/14/2023 at finished       Medication Allergy:  No Known Allergies    Family History:  No family history on file.    Social History:  Social History     Socioeconomic History    Marital status:      Spouse name: Not on file    Number of children: Not on file    Years of education: Not on file    Highest education level: Not on file   Occupational History    Not on file   Tobacco Use    Smoking status: Never    Smokeless tobacco: Never   Vaping Use    Vaping Use: Every day   Substance and Sexual Activity    Alcohol use: Not on file    Drug use: Not on file    Sexual activity: Not on file   Other Topics Concern    Not on file   Social History Narrative    Not on file     Social Determinants of Health     Financial Resource Strain: Not on file   Food Insecurity: Not on file   Transportation Needs: Not on file   Physical Activity: Not on file   Stress: Not on file   Social Connections: Not on file   Intimate Partner Violence: Not on file   Housing Stability: Not on file         Physical Exam:  Vitals/ General Appearance:   Weight/BMI: Body mass index is 27.9 kg/m².  BP 97/59   Pulse 70   Temp 36.3 °C (97.4 °F) (Temporal)   Resp 16   Ht 1.778 m (5' 10\")   Wt 88.2 kg (194 lb 7.1 oz)   SpO2 99%   Vitals:    09/22/23 0346 09/22/23 0347 09/22/23 0739 09/22/23 1214   BP: 106/67  97/59 (P) 102/63   Pulse: 80  70 (P) 81   Resp: 16  16 (P) 16   Temp: 36.1 °C (97 °F)  36.3 °C (97.4 °F) (P) 36.2 °C (97.2 °F)   TempSrc: Temporal  Temporal (P) Temporal   SpO2: 95%  99% (P) 99%   Weight:  88.2 kg (194 lb 7.1 oz)     Height:         Oxygen Therapy:  Pulse Oximetry: (P) 99 %, O2 (LPM): (P) 0, O2 Delivery Device: " (P) None - Room Air    Constitutional: No acute distress  HENMT:  Normocephalic, Atraumatic  Eyes:  EOMI, Conjunctiva normal, No discharge.  Lungs:  Normal respiratory effort.   : Granger in place draining cloudy light yellow urine  Skin: Warm, Dry, No erythema, No rash, no induration.  Neurologic: Alert & oriented x 3, No focal deficits noted.  Psychiatric: Affect normal, Judgment normal, Mood normal.      MDM (Data Review):     Records reviewed and summarized in current documentation    Lab Data Review:  Recent Results (from the past 24 hour(s))   HEMOGLOBIN AND HEMATOCRIT    Collection Time: 09/21/23 12:20 PM   Result Value Ref Range    Hemoglobin 9.6 (L) 14.0 - 18.0 g/dL    Hematocrit 29.7 (L) 42.0 - 52.0 %   LACTIC ACID    Collection Time: 09/22/23  7:16 AM   Result Value Ref Range    Lactic Acid 1.6 0.5 - 2.0 mmol/L   CBC WITHOUT DIFFERENTIAL    Collection Time: 09/22/23  7:53 AM   Result Value Ref Range    WBC 8.9 4.8 - 10.8 K/uL    RBC 3.27 (L) 4.70 - 6.10 M/uL    Hemoglobin 9.3 (L) 14.0 - 18.0 g/dL    Hematocrit 29.4 (L) 42.0 - 52.0 %    MCV 89.9 81.4 - 97.8 fL    MCH 28.4 27.0 - 33.0 pg    MCHC 31.6 (L) 32.3 - 36.5 g/dL    RDW 64.0 (H) 35.9 - 50.0 fL    Platelet Count 102 (L) 164 - 446 K/uL    MPV 11.2 9.0 - 12.9 fL   Comp Metabolic Panel    Collection Time: 09/22/23  7:53 AM   Result Value Ref Range    Sodium 142 135 - 145 mmol/L    Potassium 4.2 3.6 - 5.5 mmol/L    Chloride 115 (H) 96 - 112 mmol/L    Co2 18 (L) 20 - 33 mmol/L    Anion Gap 9.0 7.0 - 16.0    Glucose 90 65 - 99 mg/dL    Bun 26 (H) 8 - 22 mg/dL    Creatinine 1.24 0.50 - 1.40 mg/dL    Calcium 7.6 (L) 8.5 - 10.5 mg/dL    Correct Calcium 9.3 8.5 - 10.5 mg/dL    AST(SGOT) 37 12 - 45 U/L    ALT(SGPT) 24 2 - 50 U/L    Alkaline Phosphatase 187 (H) 30 - 99 U/L    Total Bilirubin 0.5 0.1 - 1.5 mg/dL    Albumin 1.9 (L) 3.2 - 4.9 g/dL    Total Protein 4.5 (L) 6.0 - 8.2 g/dL    Globulin 2.6 1.9 - 3.5 g/dL    A-G Ratio 0.7 g/dL   IMMATURE PLT FRACTION     Collection Time: 09/22/23  7:53 AM   Result Value Ref Range    Imm. Plt Fraction 4.7 0.6 - 13.1 %   ESTIMATED GFR    Collection Time: 09/22/23  7:53 AM   Result Value Ref Range    GFR (CKD-EPI) 56 (A) >60 mL/min/1.73 m 2       Imaging/Procedures Review:    Reviewed    MDM (Assessment and Plan):     History of Kidney Stones  Urosepsis  Erika  ESBL UTIs  Bilateral Hydronephrosis  Chronic Bilateral Ureteral Stents    88yoM w/ hx kidney stones and initially had b/l ureteral stents placed due to infection with unknown source and b/l hydro 7/28/23. Subsequent CT at [facility] (8/11/23) with no evidence of nephrolithiasis and persistent b/l hydro, despite stents in place. B/l stents last exchanged 8/16/23. Previously undergoing work up for high pressure, neurogenic bladder--may be refluxing urine to kidneys and contributing to infections. Here now with urine/blood cultures positive for ESBL K pneumoniae/yeast, on meropenem, improving. KUB (9/19) demonstrating b/l stents in good position.     WBC improving, on meropenem  Creat improving   AFVSS    Plan:  -Continue antibiotics per primary team/cultures  -Continue graham catheter  -No emergent plans for stent removal/exchange, will consider PRN for worsening clinical picture  -Urology following    Plan of care discussed with patient and urology team.     Kellie Gonzalez PA-C  [facility] [place]

## 2023-09-22 NOTE — PROGRESS NOTES
Telemetry Monitor Report    Rhythm: SR afib  Ectopy: PAC, PVC  HR: 68 - 101    .16/.07/.40

## 2023-09-22 NOTE — CARE PLAN
The patient is Stable - Low risk of patient condition declining or worsening    Shift Goals  Clinical Goals: q2 turns, wound care, IV abx  Patient Goals: rest  Family Goals: MARIVEL    Progress made toward(s) clinical / shift goals:      IV antibiotics infused per MD orders.     Patient is a very pleasantly confused 88 year old male, oriented to self only. Compliant with plan of care.     Problem: Respiratory  Goal: Patient will achieve/maintain optimum respiratory ventilation and gas exchange  Outcome: Progressing  Flowsheets (Taken 9/22/2023 0009 by Rebecca Golden)  O2 Delivery Device: None - Room Air     Problem: Skin Integrity  Goal: Skin integrity is maintained or improved  Outcome: Progressing  Note: Wound care conducted per orders. Patient on low air loss mattress. TAPS system in place and q2 turning utilized with wedge support. Heel protectors in place bilaterally.      Problem: Fall Risk  Goal: Patient will remain free from falls  Outcome: Progressing  Note: Frame alarm on, frequent rounding in place.      Problem: Pain - Standard  Goal: Alleviation of pain or a reduction in pain to the patient’s comfort goal  Outcome: Progressing  Flowsheets (Taken 9/21/2023 2000)  Pain Rating Scale (NPRS): 0  Note: Patient having no complaints of pain thus far. Will continue to monitor and medicate as needed.        Patient is not progressing towards the following goals:      Problem: Knowledge Deficit - Standard  Goal: Patient and family/care givers will demonstrate understanding of plan of care, disease process/condition, diagnostic tests and medications  Outcome: Not Progressing  Note: Patient updated on plan of care, however, patient is confused and history of dementia, requiring frequent reorientation.

## 2023-09-23 LAB
ANION GAP SERPL CALC-SCNC: 8 MMOL/L (ref 7–16)
BUN SERPL-MCNC: 22 MG/DL (ref 8–22)
CALCIUM SERPL-MCNC: 7.7 MG/DL (ref 8.5–10.5)
CHLORIDE SERPL-SCNC: 113 MMOL/L (ref 96–112)
CO2 SERPL-SCNC: 20 MMOL/L (ref 20–33)
CREAT SERPL-MCNC: 1.13 MG/DL (ref 0.5–1.4)
ERYTHROCYTE [DISTWIDTH] IN BLOOD BY AUTOMATED COUNT: 63.1 FL (ref 35.9–50)
GFR SERPLBLD CREATININE-BSD FMLA CKD-EPI: 62 ML/MIN/1.73 M 2
GLUCOSE SERPL-MCNC: 80 MG/DL (ref 65–99)
HCT VFR BLD AUTO: 33.9 % (ref 42–52)
HGB BLD-MCNC: 11.1 G/DL (ref 14–18)
MAGNESIUM SERPL-MCNC: 1.8 MG/DL (ref 1.5–2.5)
MCH RBC QN AUTO: 29.1 PG (ref 27–33)
MCHC RBC AUTO-ENTMCNC: 32.7 G/DL (ref 32.3–36.5)
MCV RBC AUTO: 88.7 FL (ref 81.4–97.8)
PLATELET # BLD AUTO: 75 K/UL (ref 164–446)
PMV BLD AUTO: 11.6 FL (ref 9–12.9)
POTASSIUM SERPL-SCNC: 4.4 MMOL/L (ref 3.6–5.5)
RBC # BLD AUTO: 3.82 M/UL (ref 4.7–6.1)
SODIUM SERPL-SCNC: 141 MMOL/L (ref 135–145)
WBC # BLD AUTO: 5.3 K/UL (ref 4.8–10.8)

## 2023-09-23 PROCEDURE — 80048 BASIC METABOLIC PNL TOTAL CA: CPT

## 2023-09-23 PROCEDURE — 770001 HCHG ROOM/CARE - MED/SURG/GYN PRIV*

## 2023-09-23 PROCEDURE — 700105 HCHG RX REV CODE 258

## 2023-09-23 PROCEDURE — 700111 HCHG RX REV CODE 636 W/ 250 OVERRIDE (IP)

## 2023-09-23 PROCEDURE — 36415 COLL VENOUS BLD VENIPUNCTURE: CPT

## 2023-09-23 PROCEDURE — 83735 ASSAY OF MAGNESIUM: CPT

## 2023-09-23 PROCEDURE — A9270 NON-COVERED ITEM OR SERVICE: HCPCS

## 2023-09-23 PROCEDURE — A9270 NON-COVERED ITEM OR SERVICE: HCPCS | Performed by: HOSPITALIST

## 2023-09-23 PROCEDURE — 700102 HCHG RX REV CODE 250 W/ 637 OVERRIDE(OP)

## 2023-09-23 PROCEDURE — 99232 SBSQ HOSP IP/OBS MODERATE 35: CPT | Mod: GC | Performed by: INTERNAL MEDICINE

## 2023-09-23 PROCEDURE — 85027 COMPLETE CBC AUTOMATED: CPT

## 2023-09-23 PROCEDURE — 700102 HCHG RX REV CODE 250 W/ 637 OVERRIDE(OP): Performed by: HOSPITALIST

## 2023-09-23 RX ADMIN — MEROPENEM 500 MG: 500 INJECTION, POWDER, FOR SOLUTION INTRAVENOUS at 17:33

## 2023-09-23 RX ADMIN — MEROPENEM 500 MG: 500 INJECTION, POWDER, FOR SOLUTION INTRAVENOUS at 05:21

## 2023-09-23 RX ADMIN — OMEPRAZOLE 20 MG: 20 CAPSULE, DELAYED RELEASE ORAL at 05:19

## 2023-09-23 RX ADMIN — RIVAROXABAN 10 MG: 10 TABLET, FILM COATED ORAL at 17:30

## 2023-09-23 RX ADMIN — TAMSULOSIN HYDROCHLORIDE 0.4 MG: 0.4 CAPSULE ORAL at 17:30

## 2023-09-23 RX ADMIN — OXYBUTYNIN CHLORIDE 5 MG: 5 TABLET, EXTENDED RELEASE ORAL at 05:19

## 2023-09-23 RX ADMIN — MEMANTINE HYDROCHLORIDE 5 MG: 10 TABLET ORAL at 05:19

## 2023-09-23 RX ADMIN — MEROPENEM 500 MG: 500 INJECTION, POWDER, FOR SOLUTION INTRAVENOUS at 12:25

## 2023-09-23 ASSESSMENT — ENCOUNTER SYMPTOMS
WEIGHT LOSS: 0
ORTHOPNEA: 0
SPUTUM PRODUCTION: 0
SHORTNESS OF BREATH: 0
FOCAL WEAKNESS: 0
PALPITATIONS: 0
BLOOD IN STOOL: 0
FEVER: 0
SPEECH CHANGE: 0
MEMORY LOSS: 0
HEMOPTYSIS: 0
WEAKNESS: 1
DOUBLE VISION: 0
HEADACHES: 0
DIARRHEA: 0
COUGH: 0
ABDOMINAL PAIN: 0
VOMITING: 0
NAUSEA: 0
BLURRED VISION: 0
DIZZINESS: 0
CHILLS: 0

## 2023-09-23 ASSESSMENT — PATIENT HEALTH QUESTIONNAIRE - PHQ9
SUM OF ALL RESPONSES TO PHQ9 QUESTIONS 1 AND 2: 0
2. FEELING DOWN, DEPRESSED, IRRITABLE, OR HOPELESS: NOT AT ALL
SUM OF ALL RESPONSES TO PHQ9 QUESTIONS 1 AND 2: 0
1. LITTLE INTEREST OR PLEASURE IN DOING THINGS: NOT AT ALL
2. FEELING DOWN, DEPRESSED, IRRITABLE, OR HOPELESS: NOT AT ALL
1. LITTLE INTEREST OR PLEASURE IN DOING THINGS: NOT AT ALL

## 2023-09-23 ASSESSMENT — FIBROSIS 4 INDEX: FIB4 SCORE: 6.52

## 2023-09-23 ASSESSMENT — PAIN DESCRIPTION - PAIN TYPE
TYPE: ACUTE PAIN
TYPE: ACUTE PAIN

## 2023-09-23 NOTE — PROGRESS NOTES
UROLOGY Progress Note:    Patient ID:  Name:             Trev Carl                   1935  Age:                 88 y.o.  male   MRN:               [id number]                                                             Reason for Consult:      History of Kidney Stones  Urosepsis  Erika  ESBL UTIs  Bilateral Hydronephrosis  Chronic Bilateral Ureteral Stents    History of Present Illness:    This is a pleasant 88yoM with history of dementia, kidney stones, rUTIs, and incomplete emptying p/w urosepsis with bilateral ureteral stents in place, last exchanged 2023. Patient presented with urosepsis, urine and blood cultures + ESBL Klebsiella pneumoniae and yeast. KUB () demonstrated stents in good position. Patient is on meropenum, WBC is declining, 12.7 (29.2) and ESTEE resolving, Cr 1.35 (1.54). He is afebrile, transferred to floor from ICU .     On chart review, patient had graham placed for ORIF of hip 23, with GH following thus CTU was ordered 23 demonstrating large L ureteral stone and mild hydro. S/p L CULTS 23. Stent removed 23.     Represented to [facility] ED on 23 with 2mm distal L ureteral stone and moderate b/l hydro noted. Graham and b/l stents placed 23 with pus draining from L ureter. +UTI with Klebsiella at that time.    Subsequently, patient was admitted to the ICU for urosepsis in 2023. CT 23 showed both ureteral stents in good position, with persistent bilateral hydronephrosis and no significant interval decompression despite stent placement 23. Prior cysto stent exchange revealed small capacity bladder with trabeculations. He has a history of elevated PVRs ~140cc in 2023. During 2023 admission, a graham was placed for elevated residuals (~525cc). Patient had b/l stent exchange with Dr. Irizarry 23 for UTI at that time, per ID recommendations. Prior plan was to continue with stents and do urodynamics to further assess bladder  function--possible high pressure, neurogenic bladder contributing to retention and hydronephrosis.       Interval Updates:    9/23. Chart check only. Labs remain stable. Urology will sign off - no emergent surgical intervention is needed at this time.     9/22. Patient appears to be clinically improving; labs, including WBC and creatinine, improved. AFVSS. Discussed outpatient follow up plan with patient and family member at bedside.      Review of Systems:      Unable to assess due to dementia/poor historian.              Past Medical History:   Past Medical History:   Diagnosis Date    Hearing loss      Active Hospital Problems    Diagnosis     Hyperchloremic metabolic acidosis [E87.29]     Bacteremia [R78.81]     Atrial fibrillation (HCC) [I48.91]     Acute kidney injury (HCC) [N17.9]     Anemia [D64.9]     Septic shock (HCC) [A41.9, R65.21]     Urinary tract infection due to ESBL Klebsiella [N39.0, B96.89]     Thrombocytopenia (HCC) [D69.6]     Dementia without behavioral disturbance (HCC) [F03.90]        Past Surgical History:  History reviewed. No pertinent surgical history.    Hospital Medications:    Current Facility-Administered Medications:     rivaroxaban (Xarelto) tablet 10 mg, 10 mg, Oral, DAILY AT 1800, Guille Paulson M.D., 10 mg at 09/22/23 1755    Notify MD and PharmD if FSBG level is less than or equal to 70 mg/dL or patient is showing signs/symptoms of hypoglycemia (tachycardia, palpitations, diaphoresis, clammy, tremulousness, nausea, confused), , , Once **AND** Administer 20 grams of glucose (approximately 8 ounces of fruit juice) every 15 minutes PRN FSBS less than 70 mg/dL, , , PRN **AND** dextrose 50% (D50W) injection 25 g, 25 g, Intravenous, Q15 MIN PRN, Zee Smiley, A.P.R.N., 25 g at 09/20/23 0752    meropenem (Merrem) 500 mg in  mL IV-MBP, 500 mg, Intravenous, Q8HRS, Dinesh Cerda M.D., Stopped at 09/23/23 0551    senna-docusate (Pericolace Or Senokot S) 8.6-50 MG per tablet  2 Tablet, 2 Tablet, Oral, BID, 2 Tablet at 09/22/23 1755 **AND** polyethylene glycol/lytes (Miralax) PACKET 1 Packet, 1 Packet, Oral, QDAY PRN **AND** magnesium hydroxide (Milk Of Magnesia) suspension 30 mL, 30 mL, Oral, QDAY PRN **AND** bisacodyl (Dulcolax) suppository 10 mg, 10 mg, Rectal, QDAY PRN, Bunny Martínez D.O.    Respiratory Therapy Consult, , Nebulization, Continuous RT, Bunny Martínez D.O.    labetalol (Normodyne/Trandate) injection 10 mg, 10 mg, Intravenous, Q4HRS PRN, Bunny Martínez D.O.    ondansetron (Zofran) syringe/vial injection 4 mg, 4 mg, Intravenous, Q4HRS PRN, Bunny Martínez D.O.    ondansetron (Zofran ODT) dispertab 4 mg, 4 mg, Oral, Q4HRS PRN, Bunny Martínez D.O.    memantine (Namenda) tablet 5 mg, 5 mg, Oral, DAILY, Zee Smiley A.P.R.N., 5 mg at 09/23/23 0519    omeprazole (PriLOSEC) capsule 20 mg, 20 mg, Oral, DAILY, Zee Smiley A.P.R.N., 20 mg at 09/23/23 0519    oxybutynin SR (Ditropan-XL) tablet 5 mg, 5 mg, Oral, DAILY, Zee Smiley A.P.R.N., 5 mg at 09/23/23 0519    tamsulosin (Flomax) capsule 0.4 mg, 0.4 mg, Oral, Q EVENING, Zee Smiley A.P.R.N., 0.4 mg at 09/22/23 1755    Current Outpatient Medications:  Medications Prior to Admission   Medication Sig Dispense Refill Last Dose    melatonin 3 MG Tab Take 3 mg by mouth at bedtime.   9/18/2023 at 2100    memantine (NAMENDA) 5 MG Tab Take 5 mg by mouth every day.   9/19/2023 at 0800    omeprazole (PRILOSEC) 20 MG delayed-release capsule Take 20 mg by mouth every day.   9/19/2023 at 0800    oxybutynin SR (DITROPAN-XL) 5 MG TABLET SR 24 HR Take 5 mg by mouth every day.   9/19/2023 at 0800    potassium chloride SA (KDUR) 20 MEQ Tab CR Take 60 mEq by mouth every day. 20 meq x 3 tablets = 60 meq   9/19/2023 at 0800    tamsulosin (FLOMAX) 0.4 MG capsule Take 0.4 mg by mouth every evening.   9/18/2023 at 2000    terazosin (HYTRIN) 5 MG Cap Take 5 mg by mouth every evening.   9/18/2023 at 2000     "terazosin (HYTRIN) 5 MG Cap Take 5 mg by mouth every day.   2023 at 0800    senna-docusate (PERICOLACE OR SENOKOT S) 8.6-50 MG Tab Take 1 Tablet by mouth 2 times a day.   2023 at 0800    [] Sodium Chloride (NS) Solution Infuse  into a venous catheter continuous.   2023 at continuous    nystatin (MYCOSTATIN) 741791 UNIT/ML Suspension Take 500,000 Units by mouth 4 times a day.   2023 at finished    guaiFENesin (ROBITUSSIN) 100 MG/5ML liquid Take 10 mL by mouth every four hours as needed for Cough.   2023 at finished       Medication Allergy:  No Known Allergies    Family History:  No family history on file.    Social History:  Social History     Socioeconomic History    Marital status:      Spouse name: Not on file    Number of children: Not on file    Years of education: Not on file    Highest education level: Not on file   Occupational History    Not on file   Tobacco Use    Smoking status: Never    Smokeless tobacco: Never   Vaping Use    Vaping Use: Every day   Substance and Sexual Activity    Alcohol use: Not on file    Drug use: Not on file    Sexual activity: Not on file   Other Topics Concern    Not on file   Social History Narrative    Not on file     Social Determinants of Health     Financial Resource Strain: Not on file   Food Insecurity: Not on file   Transportation Needs: Not on file   Physical Activity: Not on file   Stress: Not on file   Social Connections: Not on file   Intimate Partner Violence: Not on file   Housing Stability: Not on file         Physical Exam:  Weight/BMI: Body mass index is 28.88 kg/m².  /59   Pulse 81   Temp 36.4 °C (97.5 °F) (Temporal)   Resp 16   Ht 1.778 m (5' 10\")   Wt 91.3 kg (201 lb 4.5 oz)   SpO2 93%   Vitals:    23 2109 23 0428 23 0430 23 0740   BP: (!) 134/109 99/62  108/59   Pulse: 79 80  81   Resp: 16 16  16   Temp: 36.4 °C (97.5 °F) 36.2 °C (97.1 °F)  36.4 °C (97.5 °F)   TempSrc: Temporal " Temporal  Temporal   SpO2: 95% 94%  93%   Weight:   91.3 kg (201 lb 4.5 oz)    Height:         Oxygen Therapy:  Pulse Oximetry: 93 %, O2 (LPM): 0, O2 Delivery Device: None - Room Air      MDM (Data Review):     Records reviewed and summarized in current documentation    Lab Data Review:  Recent Results (from the past 24 hour(s))   BLOOD CULTURE    Collection Time: 09/22/23 10:12 AM    Specimen: Peripheral; Blood   Result Value Ref Range    Significant Indicator NEG     Source BLD     Site PERIPHERAL     Culture Result       No Growth  Note: Blood cultures are incubated for 5 days and  are monitored continuously.Positive blood cultures  are called to the RN and reported as soon as  they are identified.     BLOOD CULTURE    Collection Time: 09/22/23 10:12 AM    Specimen: Peripheral; Blood   Result Value Ref Range    Significant Indicator NEG     Source BLD     Site PERIPHERAL     Culture Result       No Growth  Note: Blood cultures are incubated for 5 days and  are monitored continuously.Positive blood cultures  are called to the RN and reported as soon as  they are identified.     CBC WITHOUT DIFFERENTIAL    Collection Time: 09/23/23  3:42 AM   Result Value Ref Range    WBC 5.3 4.8 - 10.8 K/uL    RBC 3.82 (L) 4.70 - 6.10 M/uL    Hemoglobin 11.1 (L) 14.0 - 18.0 g/dL    Hematocrit 33.9 (L) 42.0 - 52.0 %    MCV 88.7 81.4 - 97.8 fL    MCH 29.1 27.0 - 33.0 pg    MCHC 32.7 32.3 - 36.5 g/dL    RDW 63.1 (H) 35.9 - 50.0 fL    Platelet Count 75 (L) 164 - 446 K/uL    MPV 11.6 9.0 - 12.9 fL   Basic Metabolic Panel    Collection Time: 09/23/23  3:42 AM   Result Value Ref Range    Sodium 141 135 - 145 mmol/L    Potassium 4.4 3.6 - 5.5 mmol/L    Chloride 113 (H) 96 - 112 mmol/L    Co2 20 20 - 33 mmol/L    Glucose 80 65 - 99 mg/dL    Bun 22 8 - 22 mg/dL    Creatinine 1.13 0.50 - 1.40 mg/dL    Calcium 7.7 (L) 8.5 - 10.5 mg/dL    Anion Gap 8.0 7.0 - 16.0   MAGNESIUM    Collection Time: 09/23/23  3:42 AM   Result Value Ref Range     Magnesium 1.8 1.5 - 2.5 mg/dL   ESTIMATED GFR    Collection Time: 09/23/23  3:42 AM   Result Value Ref Range    GFR (CKD-EPI) 62 >60 mL/min/1.73 m 2       Imaging/Procedures Review:    Reviewed    MDM (Assessment and Plan):     History of Kidney Stones  Urosepsis  Erika  ESBL UTIs  Bilateral Hydronephrosis  Chronic Bilateral Ureteral Stents    88yoM w/ hx kidney stones and initially had b/l ureteral stents placed due to infection with unknown source and b/l hydro 7/28/23. Subsequent CT at [facility] (8/11/23) with no evidence of nephrolithiasis and persistent b/l hydro, despite stents in place. B/l stents last exchanged 8/16/23. Previously undergoing work up for high pressure, neurogenic bladder--may be refluxing urine to kidneys and contributing to infections. Here now with urine/blood cultures positive for ESBL K pneumoniae/yeast, on meropenem, improving. KUB (9/19) demonstrating b/l stents in good position.     WBC improving, on meropenem  Creat improving   AFVSS    Chart check today - labs remain stable/improved. Patient clinically improving.    Plan:  -Continue antibiotics per primary team/cultures  -Continue graham catheter  -No emergent plans for stent removal/exchange  -Urology will sign off, our office will call to arrange outpatient follow up visits for graham catheter management and further urological workup    Plan of care discussed with patient, family at bedside, and urology team.     Kellie Gonzalez PA-C  [facility] [place]

## 2023-09-23 NOTE — PROGRESS NOTES
[facility] Internal Medicine Daily Progress Note    Date of Service  9/23/2023    R Team: [facility] IM Purple Team   Attending: Minh Gutierrez M.d.  Senior Resident: Dr. Duyen chandler   Intern:  Dr. Dinesh barbosa  Contact Number: [phone]    Chief Complaint  Trev Carl is a 88 y.o. male admitted 9/19/2023 with UTI complicated with sepsis.     Hospital Course  No notes on file      Interval Problem Update  -States improvement in symptoms but still at baseline dementia.  -Blood pressure is in desirable range.   -Per ID, complete meropenem for 7 days followed by chronic suppressive therapy with minocycline.  --Per Urology, No emergent plans for stent removal/exchange,outpatient follow up visits for graham catheter management and further urological workup.  -Repeat set of blood cultures shows no growth  -PT/OT evaluation pending.    Consultants/Specialty  critical care    Code Status  Full Code    Disposition  The patient is not medically cleared for discharge to home or a post-acute facility.      I have placed the appropriate orders for post-discharge needs.    Review of Systems  Review of Systems   Constitutional:  Negative for chills, fever and weight loss.   HENT:  Negative for ear discharge, ear pain, nosebleeds and tinnitus.    Eyes:  Negative for blurred vision and double vision.   Respiratory:  Negative for cough, hemoptysis, sputum production and shortness of breath.    Cardiovascular:  Negative for chest pain, palpitations, orthopnea and leg swelling.   Gastrointestinal:  Negative for abdominal pain, blood in stool, diarrhea, nausea and vomiting.   Genitourinary:  Negative for dysuria and hematuria.   Musculoskeletal:         C/o right knee pain   Neurological:  Positive for weakness. Negative for dizziness, speech change, focal weakness and headaches.   Psychiatric/Behavioral:  Negative for memory loss.         Physical Exam  Temp:  [36.2 °C (97.1 °F)-36.4 °C (97.5 °F)] 36.4 °C (97.5 °F)  Pulse:  [79-83]  81  Resp:  [16] 16  BP: ()/() 108/59  SpO2:  [93 %-98 %] 93 %    Physical Exam  Constitutional:       Appearance: He is not ill-appearing.      Comments: Not oriented to place and situation.   HENT:      Head: Normocephalic and atraumatic.      Mouth/Throat:      Mouth: Mucous membranes are dry.      Pharynx: Oropharynx is clear.   Eyes:      Extraocular Movements: Extraocular movements intact.      Pupils: Pupils are equal, round, and reactive to light.   Cardiovascular:      Rate and Rhythm: Normal rate. Rhythm irregular.      Heart sounds: Normal heart sounds.   Pulmonary:      Effort: Pulmonary effort is normal. No respiratory distress.      Breath sounds: Normal breath sounds. No rales.   Abdominal:      General: There is no distension.      Palpations: Abdomen is soft.      Tenderness: There is no abdominal tenderness. There is no right CVA tenderness or left CVA tenderness.   Musculoskeletal:         General: Normal range of motion.      Cervical back: Normal range of motion.      Comments: B/L hand tremors on outstretched hands   Skin:     General: Skin is warm.      Capillary Refill: Capillary refill takes 2 to 3 seconds.   Neurological:      Comments: Baseline dementia   Psychiatric:      Comments: Abnormal judgement and recall         Fluids    Intake/Output Summary (Last 24 hours) at 9/23/2023 1220  Last data filed at 9/23/2023 0800  Gross per 24 hour   Intake 720 ml   Output 2600 ml   Net -1880 ml         Laboratory  Recent Labs     09/21/23  0405 09/21/23  1220 09/22/23  0753 09/23/23  0342   WBC 12.7*  --  8.9 5.3   RBC 2.95*  --  3.27* 3.82*   HEMOGLOBIN 8.4* 9.6* 9.3* 11.1*   HEMATOCRIT 26.8* 29.7* 29.4* 33.9*   MCV 90.8  --  89.9 88.7   MCH 28.5  --  28.4 29.1   MCHC 31.3*  --  31.6* 32.7   RDW 64.2*  --  64.0* 63.1*   PLATELETCT 107*  --  102* 75*   MPV 10.6  --  11.2 11.6       Recent Labs     09/21/23  0405 09/22/23  0753 09/23/23  0342   SODIUM 143 142 141   POTASSIUM 3.2* 4.2 4.4    CHLORIDE 112 115* 113*   CO2 21 18* 20   GLUCOSE 105* 90 80   BUN 31* 26* 22   CREATININE 1.35 1.24 1.13   CALCIUM 7.2* 7.6* 7.7*                       Imaging  EC-ECHOCARDIOGRAM COMPLETE W/O CONT   Final Result      CO-HOMCYDV-2 VIEW   Final Result      1.  Bilateral ureteral stents are in place and appear appropriately positioned.   2.  Nonspecific bowel gas pattern.      DX-CHEST-PORTABLE (1 VIEW)   Final Result      Patchy bilateral airspace opacities suggestive of multifocal pneumonitis.             Assessment/Plan  Problem Representation:    * Septic shock (HCC)  Assessment & Plan  This is Sepsis Present on admission  SIRS criteria identified on my evaluation include: Tachycardia, with heart rate greater than 90 BPM and Leukocytosis, with WBC greater than 12,000  Clinical indicators of end organ dysfunction include Hypotension with systolic blood pressure less than 90 or MAP less than 65 he was transferred to the ICU for IV pressors  Resolved  Source is urinary stents.  Sepsis protocol was initiated  Crystalloid Fluid Administration: Fluid resuscitation ordered per standard protocol was given.  Patient was transferred to IMCU for vasopressors. Bounced back to medical floor on 09/22/23 after blood pressure was normalized.   Per ID  IV Meropenem for 7 days followed by chronic therapy by Minocycline.         Bacteremia  Assessment & Plan  Urine Cultures growing ESBL klebsiella.  Per ID  IV Meropenem for 7 days followed by chronic therapy by Minocycline.      Urinary tract infection due to ESBL Klebsiella  Assessment & Plan  From urine culture from 9/17 grew ESBL  klebsiella bacteremia  Repeat blood culture from 9/21/23 shows no growth.  Per ID  IV Meropenem for 7 days followed by chronic therapy by Minocycline.    Hyperchloremic metabolic acidosis  Assessment & Plan  Resolved  2/2 Sepsis and aggressive NS infusion  Switch to LR for fluid resuscitation if needed.     Atrial fibrillation (HCC)  Assessment &  Plan  Rate controlled, likely reactive in the setting of sepsis, echo with mild pulmonary pretension, TSH normal  [id number] score 2, After discussion with family member and [facility], patient was not on anti-coag      Thrombocytopenia (HCC)  Assessment & Plan  Platelets improving 102  Monitor cbc and signs of mucocutaneous bleeding.     Dementia without behavioral disturbance (HCC)- (present on admission)  Assessment & Plan  Unknown at baseline mentation.  After family member discussion, patient is at baseline dementia.  Continue memantine  PT/OT eval pending    Anemia  Assessment & Plan  Chronic stable anemia. No signs of active bleed. Denies lightheadedness,dyspnoe likely dilutional anemia.   F/u CBC    Acute kidney injury (HCC)  Assessment & Plan  resolved  2/2 to septic shock and third spacing of fluids  Bun improving 26, Creatinine at baseline.  F/u renal functions.           VTE prophylaxis: SCDs/TEDs    I have performed a physical exam and reviewed and updated ROS and Plan today (9/23/2023). In review of yesterday's note (9/22/2023), there are no changes except as documented above.

## 2023-09-23 NOTE — CARE PLAN
The patient is Stable - Low risk of patient condition declining or worsening    Shift Goals  Clinical Goals: IV abx, monitor labs, graham care, wound/skin care  Patient Goals: MARIVEL  Family Goals: MARIVEL      Problem: Hemodynamics  Goal: Patient's hemodynamics, fluid balance and neurologic status will be stable or improve  Outcome: Progressing     Problem: Fluid Volume  Goal: Fluid volume balance will be maintained  Outcome: Progressing     Problem: Urinary - Renal Perfusion  Goal: Ability to achieve and maintain adequate renal perfusion and functioning will improve  Outcome: Progressing     Patient alert and oriented and less confused this morning. Patient cooperative with q2 turns and has adequate oral intake. Attended to needs. Call light within reach.

## 2023-09-23 NOTE — CARE PLAN
The patient is Stable - Low risk of patient condition declining or worsening    Shift Goals  Clinical Goals: IV abx, monitor labs, graham care, wound/skin care  Patient Goals: MARIVEL  Family Goals: MARIVEL    Progress made toward(s) clinical / shift goals:     Patient is a very pleasantly confused 88 year old male compliant with plan of care. IV antibiotics infused per MD order. Patient tolerating well. Wound care conducted following orders. Patient has no complaints of pain.      Problem: Skin Integrity  Goal: Skin integrity is maintained or improved  Outcome: Progressing  Note: Q2h turning using wedges, wound care conducted per orders, and barrier cream applied to wendy-area. Patient is on a low airloss bed.      Problem: Fall Risk  Goal: Patient will remain free from falls  Outcome: Progressing       Patient is not progressing towards the following goals:      Problem: Knowledge Deficit - Standard  Goal: Patient and family/care givers will demonstrate understanding of plan of care, disease process/condition, diagnostic tests and medications  Outcome: Not Progressing  Note: Patient remains disoriented to place, time, and situation. Frequent reorientation required throughout the shift.

## 2023-09-24 PROCEDURE — A9270 NON-COVERED ITEM OR SERVICE: HCPCS

## 2023-09-24 PROCEDURE — 700102 HCHG RX REV CODE 250 W/ 637 OVERRIDE(OP): Performed by: HOSPITALIST

## 2023-09-24 PROCEDURE — 770001 HCHG ROOM/CARE - MED/SURG/GYN PRIV*

## 2023-09-24 PROCEDURE — 700105 HCHG RX REV CODE 258

## 2023-09-24 PROCEDURE — A9270 NON-COVERED ITEM OR SERVICE: HCPCS | Performed by: HOSPITALIST

## 2023-09-24 PROCEDURE — 700105 HCHG RX REV CODE 258: Performed by: INTERNAL MEDICINE

## 2023-09-24 PROCEDURE — 700102 HCHG RX REV CODE 250 W/ 637 OVERRIDE(OP)

## 2023-09-24 PROCEDURE — 700111 HCHG RX REV CODE 636 W/ 250 OVERRIDE (IP): Performed by: INTERNAL MEDICINE

## 2023-09-24 PROCEDURE — 700111 HCHG RX REV CODE 636 W/ 250 OVERRIDE (IP)

## 2023-09-24 PROCEDURE — 99232 SBSQ HOSP IP/OBS MODERATE 35: CPT | Mod: GC | Performed by: INTERNAL MEDICINE

## 2023-09-24 RX ORDER — MINOCYCLINE HYDROCHLORIDE 50 MG/1
100 CAPSULE ORAL EVERY 12 HOURS
Status: DISCONTINUED | OUTPATIENT
Start: 2023-09-27 | End: 2023-09-27 | Stop reason: HOSPADM

## 2023-09-24 RX ADMIN — TAMSULOSIN HYDROCHLORIDE 0.4 MG: 0.4 CAPSULE ORAL at 16:54

## 2023-09-24 RX ADMIN — OMEPRAZOLE 20 MG: 20 CAPSULE, DELAYED RELEASE ORAL at 05:19

## 2023-09-24 RX ADMIN — RIVAROXABAN 10 MG: 10 TABLET, FILM COATED ORAL at 16:54

## 2023-09-24 RX ADMIN — MEMANTINE HYDROCHLORIDE 5 MG: 10 TABLET ORAL at 05:18

## 2023-09-24 RX ADMIN — MEROPENEM 500 MG: 500 INJECTION, POWDER, FOR SOLUTION INTRAVENOUS at 11:37

## 2023-09-24 RX ADMIN — MEROPENEM 500 MG: 500 INJECTION, POWDER, FOR SOLUTION INTRAVENOUS at 23:16

## 2023-09-24 RX ADMIN — SENNOSIDES AND DOCUSATE SODIUM 2 TABLET: 50; 8.6 TABLET ORAL at 05:18

## 2023-09-24 RX ADMIN — MEROPENEM 500 MG: 500 INJECTION, POWDER, FOR SOLUTION INTRAVENOUS at 16:55

## 2023-09-24 RX ADMIN — MEROPENEM 500 MG: 500 INJECTION, POWDER, FOR SOLUTION INTRAVENOUS at 05:22

## 2023-09-24 RX ADMIN — MEROPENEM 500 MG: 500 INJECTION, POWDER, FOR SOLUTION INTRAVENOUS at 01:16

## 2023-09-24 RX ADMIN — OXYBUTYNIN CHLORIDE 5 MG: 5 TABLET, EXTENDED RELEASE ORAL at 05:19

## 2023-09-24 RX ADMIN — SENNOSIDES AND DOCUSATE SODIUM 2 TABLET: 50; 8.6 TABLET ORAL at 16:54

## 2023-09-24 ASSESSMENT — PATIENT HEALTH QUESTIONNAIRE - PHQ9
2. FEELING DOWN, DEPRESSED, IRRITABLE, OR HOPELESS: NOT AT ALL
SUM OF ALL RESPONSES TO PHQ9 QUESTIONS 1 AND 2: 0
1. LITTLE INTEREST OR PLEASURE IN DOING THINGS: NOT AT ALL

## 2023-09-24 ASSESSMENT — ENCOUNTER SYMPTOMS
CHILLS: 0
FEVER: 0
WEAKNESS: 1
WEIGHT LOSS: 0
FOCAL WEAKNESS: 0
SHORTNESS OF BREATH: 0
BLOOD IN STOOL: 0
SPEECH CHANGE: 0
DIARRHEA: 0
SPUTUM PRODUCTION: 0
HEMOPTYSIS: 0
HEADACHES: 0
VOMITING: 0
BLURRED VISION: 0
PALPITATIONS: 0
COUGH: 0
ABDOMINAL PAIN: 0
ORTHOPNEA: 0
DIZZINESS: 0
DOUBLE VISION: 0
NAUSEA: 0
MEMORY LOSS: 0

## 2023-09-24 NOTE — PROGRESS NOTES
[facility] Internal Medicine Daily Progress Note    Date of Service  9/24/2023    [facility] Team: [facility] IM Purple Team   Attending: Minh Gutierrez M.d.  Senior Resident: Dr. Duyen chandler   Intern:  Dr. Dinesh barbosa  Contact Number: [phone]    Chief Complaint  Trev Carl is a 88 y.o. male admitted 9/19/2023 with UTI complicated with sepsis.     Hospital Course  No notes on file      Interval Problem Update  -States improvement in symptoms but still at baseline dementia.  -Blood pressure is in desirable range.   -Per ID, complete meropenem for 7 days followed by chronic suppressive therapy with minocycline.  --Per Urology, No emergent plans for stent removal/exchange,outpatient follow up visits for graham catheter management and further urological workup.  -Repeat set of blood cultures shows no growth  -Pending SNF placement.    Consultants/Specialty  critical care    Code Status  Full Code    Disposition  The patient is medically cleared for discharge to home or a post-acute facility.      I have placed the appropriate orders for post-discharge needs.    Review of Systems  Review of Systems   Constitutional:  Negative for chills, fever and weight loss.   HENT:  Negative for ear discharge, ear pain, nosebleeds and tinnitus.    Eyes:  Negative for blurred vision and double vision.   Respiratory:  Negative for cough, hemoptysis, sputum production and shortness of breath.    Cardiovascular:  Negative for chest pain, palpitations, orthopnea and leg swelling.   Gastrointestinal:  Negative for abdominal pain, blood in stool, diarrhea, nausea and vomiting.   Genitourinary:  Negative for dysuria and hematuria.   Musculoskeletal:         C/o right knee pain   Neurological:  Positive for weakness. Negative for dizziness, speech change, focal weakness and headaches.   Psychiatric/Behavioral:  Negative for memory loss.         Physical Exam  Temp:  [36.1 °C (97 °F)-36.6 °C (97.8 °F)] 36.6 °C (97.8 °F)  Pulse:  [63-97] 63  Resp:   [17-18] 18  BP: (100-105)/(46-77) 101/59  SpO2:  [90 %-96 %] 90 %    Physical Exam  Constitutional:       Appearance: He is not ill-appearing.      Comments: Not oriented to place and situation.   HENT:      Head: Normocephalic and atraumatic.      Mouth/Throat:      Mouth: Mucous membranes are dry.      Pharynx: Oropharynx is clear.   Eyes:      Extraocular Movements: Extraocular movements intact.      Pupils: Pupils are equal, round, and reactive to light.   Cardiovascular:      Rate and Rhythm: Normal rate. Rhythm irregular.      Heart sounds: Normal heart sounds.   Pulmonary:      Effort: Pulmonary effort is normal. No respiratory distress.      Breath sounds: Normal breath sounds. No rales.   Abdominal:      General: There is no distension.      Palpations: Abdomen is soft.      Tenderness: There is no abdominal tenderness. There is no right CVA tenderness or left CVA tenderness.   Musculoskeletal:         General: Normal range of motion.      Cervical back: Normal range of motion.      Comments: B/L hand tremors on outstretched hands   Skin:     General: Skin is warm.      Capillary Refill: Capillary refill takes 2 to 3 seconds.   Neurological:      Comments: Baseline dementia   Psychiatric:      Comments: Abnormal judgement and recall         Fluids    Intake/Output Summary (Last 24 hours) at 9/24/2023 1215  Last data filed at 9/24/2023 0800  Gross per 24 hour   Intake 950 ml   Output 2100 ml   Net -1150 ml         Laboratory  Recent Labs     09/21/23  1220 09/22/23  0753 09/23/23  0342   WBC  --  8.9 5.3   RBC  --  3.27* 3.82*   HEMOGLOBIN 9.6* 9.3* 11.1*   HEMATOCRIT 29.7* 29.4* 33.9*   MCV  --  89.9 88.7   MCH  --  28.4 29.1   MCHC  --  31.6* 32.7   RDW  --  64.0* 63.1*   PLATELETCT  --  102* 75*   MPV  --  11.2 11.6       Recent Labs     09/22/23  0753 09/23/23  0342   SODIUM 142 141   POTASSIUM 4.2 4.4   CHLORIDE 115* 113*   CO2 18* 20   GLUCOSE 90 80   BUN 26* 22   CREATININE 1.24 1.13   CALCIUM 7.6*  7.7*                       Imaging  EC-ECHOCARDIOGRAM COMPLETE W/O CONT   Final Result      [id number]-5 VIEW   Final Result      1.  Bilateral ureteral stents are in place and appear appropriately positioned.   2.  Nonspecific bowel gas pattern.      DX-CHEST-PORTABLE (1 VIEW)   Final Result      Patchy bilateral airspace opacities suggestive of multifocal pneumonitis.             Assessment/Plan  Problem Representation:    * Septic shock (HCC)  Assessment & Plan  This is Sepsis Present on admission  SIRS criteria identified on my evaluation include: Tachycardia, with heart rate greater than 90 BPM and Leukocytosis, with WBC greater than 12,000  Clinical indicators of end organ dysfunction include Hypotension with systolic blood pressure less than 90 or MAP less than 65 he was transferred to the ICU for IV pressors  Resolved  Source is urinary stents.  Sepsis protocol was initiated  Crystalloid Fluid Administration: Fluid resuscitation ordered per standard protocol was given.  Patient was transferred to [facility] for vasopressors. Bounced back to medical floor on 09/22/23 after blood pressure was normalized.   Per ID  IV Meropenem for 7 days followed by chronic therapy by Minocycline.         Bacteremia  Assessment & Plan  Urine Cultures growing ESBL klebsiella.  Per ID  IV Meropenem for 7 days followed by chronic therapy by Minocycline.      Urinary tract infection due to ESBL Klebsiella  Assessment & Plan  From urine culture from 9/17 grew ESBL  klebsiella bacteremia  Repeat blood culture from 9/21/23 shows no growth.  Per ID  IV Meropenem for 7 days followed by chronic therapy by Minocycline.    Hyperchloremic metabolic acidosis  Assessment & Plan  Resolved  2/2 Sepsis and aggressive NS infusion  Switch to LR for fluid resuscitation if needed.     Atrial fibrillation (HCC)  Assessment & Plan  Rate controlled, likely reactive in the setting of sepsis, echo with mild pulmonary pretension, TSH normal  [id number] score  2, After discussion with family member and [facility], patient was not on anti-coag      Thrombocytopenia (HCC)  Assessment & Plan  Platelets improving 102  Monitor cbc and signs of mucocutaneous bleeding.     Dementia without behavioral disturbance (HCC)- (present on admission)  Assessment & Plan  Unknown at baseline mentation.  After family member discussion, patient is at baseline dementia.  Continue memantine  PT/OT eval pending    Anemia  Assessment & Plan  Chronic stable anemia. No signs of active bleed. Denies lightheadedness,dyspnoe likely dilutional anemia.   F/u CBC    Acute kidney injury (HCC)  Assessment & Plan  resolved  2/2 to septic shock and third spacing of fluids  Bun improving 26, Creatinine at baseline.  F/u renal functions.           VTE prophylaxis: SCDs/TEDs    I have performed a physical exam and reviewed and updated ROS and Plan today (9/24/2023). In review of yesterday's note (9/23/2023), there are no changes except as documented above.

## 2023-09-24 NOTE — DISCHARGE PLANNING
Case Management Discharge Planning    Admission Date: 9/19/2023  GMLOS: 5  ALOS: 5    6-Clicks ADL Score: 11  6-Clicks Mobility Score: 7  PT and/or OT Eval ordered: Yes  Post-acute Referrals Ordered: Yes  Post-acute Choice Obtained: No  Has referral(s) been sent to post-acute provider:  Yes      Anticipated Discharge Dispo: Discharge Disposition: D/T to SNF with Medicare cert in anticipation of skilled care (03)    DME Needed: No    Action(s) Taken: Patient discussed during IDT rounds.  Patient is now clear for discharge, he came from [facility].  Chart review shows that the patient was previously admitted to [facility] on 08/30 after being discharged from [facility]  SNF will likely need to re-obtain insurance authorization, patient has [employer] Medicare.  PT/OT evals are pending.  RN CHLOE sent a new referral to [facility].    Escalations Completed: Pending Discharge Destination    Medically Clear: Yes    Next Steps: Care coordination to follow up with [facility].  Care coordination to follow up with patient/family to discuss the discharge plan.    Barriers to Discharge: Pending Placement, Pending PT Evaluation, and Pending Insurance Authorization    Is the patient up for discharge tomorrow: No

## 2023-09-25 LAB
ANION GAP SERPL CALC-SCNC: 9 MMOL/L (ref 7–16)
BACTERIA BLD CULT: NORMAL
BUN SERPL-MCNC: 15 MG/DL (ref 8–22)
CALCIUM SERPL-MCNC: 7.3 MG/DL (ref 8.5–10.5)
CHLORIDE SERPL-SCNC: 111 MMOL/L (ref 96–112)
CO2 SERPL-SCNC: 19 MMOL/L (ref 20–33)
CREAT SERPL-MCNC: 0.98 MG/DL (ref 0.5–1.4)
ERYTHROCYTE [DISTWIDTH] IN BLOOD BY AUTOMATED COUNT: 64 FL (ref 35.9–50)
GFR SERPLBLD CREATININE-BSD FMLA CKD-EPI: 74 ML/MIN/1.73 M 2
GLUCOSE SERPL-MCNC: 76 MG/DL (ref 65–99)
HCT VFR BLD AUTO: 28.1 % (ref 42–52)
HGB BLD-MCNC: 8.9 G/DL (ref 14–18)
MAGNESIUM SERPL-MCNC: 1.6 MG/DL (ref 1.5–2.5)
MCH RBC QN AUTO: 28.7 PG (ref 27–33)
MCHC RBC AUTO-ENTMCNC: 31.7 G/DL (ref 32.3–36.5)
MCV RBC AUTO: 90.6 FL (ref 81.4–97.8)
PHOSPHATE SERPL-MCNC: 2.4 MG/DL (ref 2.5–4.5)
PLATELET # BLD AUTO: 112 K/UL (ref 164–446)
PMV BLD AUTO: 10.8 FL (ref 9–12.9)
POTASSIUM SERPL-SCNC: 3.8 MMOL/L (ref 3.6–5.5)
RBC # BLD AUTO: 3.1 M/UL (ref 4.7–6.1)
SIGNIFICANT IND 70042: NORMAL
SITE SITE: NORMAL
SODIUM SERPL-SCNC: 139 MMOL/L (ref 135–145)
SOURCE SOURCE: NORMAL
WBC # BLD AUTO: 9 K/UL (ref 4.8–10.8)

## 2023-09-25 PROCEDURE — A9270 NON-COVERED ITEM OR SERVICE: HCPCS

## 2023-09-25 PROCEDURE — 97535 SELF CARE MNGMENT TRAINING: CPT

## 2023-09-25 PROCEDURE — A9270 NON-COVERED ITEM OR SERVICE: HCPCS | Performed by: HOSPITALIST

## 2023-09-25 PROCEDURE — 85027 COMPLETE CBC AUTOMATED: CPT

## 2023-09-25 PROCEDURE — 700105 HCHG RX REV CODE 258: Performed by: INTERNAL MEDICINE

## 2023-09-25 PROCEDURE — 99232 SBSQ HOSP IP/OBS MODERATE 35: CPT | Mod: GC | Performed by: INTERNAL MEDICINE

## 2023-09-25 PROCEDURE — 83735 ASSAY OF MAGNESIUM: CPT

## 2023-09-25 PROCEDURE — 92526 ORAL FUNCTION THERAPY: CPT

## 2023-09-25 PROCEDURE — 700111 HCHG RX REV CODE 636 W/ 250 OVERRIDE (IP): Performed by: INTERNAL MEDICINE

## 2023-09-25 PROCEDURE — 36415 COLL VENOUS BLD VENIPUNCTURE: CPT

## 2023-09-25 PROCEDURE — 700102 HCHG RX REV CODE 250 W/ 637 OVERRIDE(OP): Performed by: HOSPITALIST

## 2023-09-25 PROCEDURE — 97162 PT EVAL MOD COMPLEX 30 MIN: CPT

## 2023-09-25 PROCEDURE — 770001 HCHG ROOM/CARE - MED/SURG/GYN PRIV*

## 2023-09-25 PROCEDURE — 700102 HCHG RX REV CODE 250 W/ 637 OVERRIDE(OP)

## 2023-09-25 PROCEDURE — 84100 ASSAY OF PHOSPHORUS: CPT

## 2023-09-25 PROCEDURE — 80048 BASIC METABOLIC PNL TOTAL CA: CPT

## 2023-09-25 RX ADMIN — OXYBUTYNIN CHLORIDE 5 MG: 5 TABLET, EXTENDED RELEASE ORAL at 05:28

## 2023-09-25 RX ADMIN — RIVAROXABAN 10 MG: 10 TABLET, FILM COATED ORAL at 16:52

## 2023-09-25 RX ADMIN — MEROPENEM 500 MG: 500 INJECTION, POWDER, FOR SOLUTION INTRAVENOUS at 05:26

## 2023-09-25 RX ADMIN — MEROPENEM 500 MG: 500 INJECTION, POWDER, FOR SOLUTION INTRAVENOUS at 12:31

## 2023-09-25 RX ADMIN — SENNOSIDES AND DOCUSATE SODIUM 2 TABLET: 50; 8.6 TABLET ORAL at 16:51

## 2023-09-25 RX ADMIN — OMEPRAZOLE 20 MG: 20 CAPSULE, DELAYED RELEASE ORAL at 05:28

## 2023-09-25 RX ADMIN — MEROPENEM 500 MG: 500 INJECTION, POWDER, FOR SOLUTION INTRAVENOUS at 17:36

## 2023-09-25 RX ADMIN — TAMSULOSIN HYDROCHLORIDE 0.4 MG: 0.4 CAPSULE ORAL at 16:51

## 2023-09-25 RX ADMIN — MEMANTINE HYDROCHLORIDE 5 MG: 10 TABLET ORAL at 05:28

## 2023-09-25 ASSESSMENT — COGNITIVE AND FUNCTIONAL STATUS - GENERAL
MOVING TO AND FROM BED TO CHAIR: UNABLE
WALKING IN HOSPITAL ROOM: TOTAL
CLIMB 3 TO 5 STEPS WITH RAILING: TOTAL
STANDING UP FROM CHAIR USING ARMS: TOTAL
SUGGESTED CMS G CODE MODIFIER MOBILITY: CN
MOVING FROM LYING ON BACK TO SITTING ON SIDE OF FLAT BED: UNABLE
MOBILITY SCORE: 6
TURNING FROM BACK TO SIDE WHILE IN FLAT BAD: UNABLE

## 2023-09-25 ASSESSMENT — ENCOUNTER SYMPTOMS
ABDOMINAL PAIN: 0
DIZZINESS: 0
CONSTIPATION: 0
HEADACHES: 0
MEMORY LOSS: 1
WEAKNESS: 0
SHORTNESS OF BREATH: 0
DIARRHEA: 0

## 2023-09-25 ASSESSMENT — GAIT ASSESSMENTS: GAIT LEVEL OF ASSIST: UNABLE TO PARTICIPATE

## 2023-09-25 ASSESSMENT — PAIN DESCRIPTION - PAIN TYPE: TYPE: ACUTE PAIN

## 2023-09-25 NOTE — CARE PLAN
Problem: Knowledge Deficit - Standard  Goal: Patient and family/care givers will demonstrate understanding of plan of care, disease process/condition, diagnostic tests and medications  Outcome: Progressing     Problem: Hemodynamics  Goal: Patient's hemodynamics, fluid balance and neurologic status will be stable or improve  Outcome: Progressing     Problem: Fluid Volume  Goal: Fluid volume balance will be maintained  Outcome: Progressing     Problem: Urinary - Renal Perfusion  Goal: Ability to achieve and maintain adequate renal perfusion and functioning will improve  Outcome: Progressing     Problem: Respiratory  Goal: Patient will achieve/maintain optimum respiratory ventilation and gas exchange  Outcome: Progressing     Problem: Mechanical Ventilation  Goal: Safe management of artificial airway and ventilation  Outcome: Progressing  Goal: Successful weaning off mechanical ventilator, spontaneously maintains adequate gas exchange  Outcome: Progressing  Goal: Patient will be able to express needs and understand communication  Outcome: Progressing     Problem: Physical Regulation  Goal: Diagnostic test results will improve  Outcome: Progressing  Goal: Signs and symptoms of infection will decrease  Outcome: Progressing     Problem: Skin Integrity  Goal: Skin integrity is maintained or improved  Outcome: Progressing     Problem: Fall Risk  Goal: Patient will remain free from falls  Outcome: Progressing     Problem: Pain - Standard  Goal: Alleviation of pain or a reduction in pain to the patient’s comfort goal  Outcome: Progressing   The patient is Stable - Low risk of patient condition declining or worsening    Shift Goals  Clinical Goals: IV abx, monitor labs, graham care, wound/skin care  Patient Goals: IV ABX, monitor v/s and labs.  Family Goals: MARIVEL    Progress made toward(s) clinical / shift goals:  manage pain, stable vs, no fevers, prog mob    Patient is not progressing towards the following goals: n/a

## 2023-09-25 NOTE — THERAPY
Physical Therapy   Initial Evaluation     Patient Name: Trev Carl  Age:  88 y.o., Sex:  male  Medical Record #: [id number]  Today's Date: 9/25/2023     Precautions: Fall Risk    Assessment  Patient is 88 y.o. male presenting acutely with bacteremia and urospesis. Pt initially required ICU level of care 2/2 hypotension. Per pts grandson, pt has been in/out of hospital/SNF since hip injury/surgery this past June and has made limited progress with mob. Today he required min A to come sit EOB. Multiple LOB posteriorly with quick fatigue deferred further mob. Pt A&O x1, [place] Pt returned BTB 2/2 fatigue. Anticipate ongoing need for post-acute placement prior to DC back to group home.    Plan    Physical Therapy Initial Treatment Plan   Treatment Plan : Bed Mobility, Equipment, Neuro Re-Education / Balance, Self Care / Home Evaluation, Therapeutic Activities, Therapeutic Exercise  Treatment Frequency: 3 Times per Week  Duration: Until Therapy Goals Met    DC Equipment Recommendations: Unable to determine at this time  Discharge Recommendations: Recommend post-acute placement for additional physical therapy services prior to discharge home     Objective    Prior Living Situation   Prior Services Continuous (24 Hour) Care Giving Per Service   Housing / Facility Group Home  ([facility])   Lives with - Patient's Self Care Capacity Attendant / Paid Care Giver   Comments Pts mare reports pt has been primarily bedbound since fall/hip surgery in June of this year. Prior to that he was ambulatory with little assistance. Pt has been on/out of hospital, SNF since fall in June due to kidney infections and has made limited progress with mob.   Prior Level of Functional Mobility   Bed Mobility Required Assist   Transfer Status Required Assist   History of Falls   History of Falls Yes   Date of Last Fall   (June 2023)   Cognition    Cognition / Consciousness X   Speech/  Communication Delayed Responses   Orientation Level   (A&O to self only)   Level of Consciousness   (lethargic)   Attention Impaired   Sequencing Impaired   Initiation Impaired   Comments believes [place], quick fatigue and difficult redirecting   Strength Upper Body   Upper Body Strength  X   Gross Strength Generalized Weakness, Equal Bilaterally.    Active ROM Lower Body    Active ROM Lower Body  X   Comments full knee extension limited by weaknes   Strength Lower Body   Gross Strength Generalized Weakness, Equal Bilaterally   Comments unable to initiate STS 2/2 quick fatigue sitting EOB   Neurological Concerns   Neurological Concerns Yes   Comments juann cognition   Balance Assessment   Sitting Balance (Static) Poor +   Sitting Balance (Dynamic) Poor -   Weight Shift Sitting Poor   Comments freqient posterior LOB   Bed Mobility    Supine to Sit Minimal Assist   Sit to Supine Moderate Assist   Scooting Moderate Assist   Rolling Moderate Assist to Rt.;Moderate Assist to Lt.   Gait Analysis   Gait Level Of Assist Unable to Participate   Functional Mobility   Sit to Stand Unable to Participate   Bed, Chair, Wheelchair Transfer Unable to Participate   How much difficulty does the patient currently have...   Turning over in bed (including adjusting bedclothes, sheets and blankets)? 1   Sitting down on and standing up from a chair with arms (e.g., wheelchair, bedside commode, etc.) 1   Moving from lying on back to sitting on the side of the bed? 1   How much help from another person does the patient currently need...   Moving to and from a bed to a chair (including a wheelchair)? 1   Need to walk in a hospital room? 1   Climbing 3-5 steps with a railing? 1   6 clicks Mobility Score 6   Activity Tolerance   Sitting in Chair Unable   Sitting Edge of Bed 12 mins   Standing Unable   Short Term Goals    Short Term Goal # 1 Pt will perform supine<>sit with SPV within 6 visits.   Short Term Goal # 2 Pt  will initiate STS with mod A within 6 visits.   Short Term Goal # 3 Pt will transfer from bed<>chair with mod A within 6 visits.   Education Group   Education Provided Role of Physical Therapist   Role of Physical Therapist Patient Response Patient;Acceptance;Explanation;Demonstration;Reinforcement Needed

## 2023-09-25 NOTE — THERAPY
"Speech Language Pathology   Daily Treatment     Patient Name: Trev Carl  AGE:  88 y.o., SEX:  male  Medical Record #: [id number]  Date of Service: 9/25/2023      Precautions:  Precautions: Fall Risk     87 y/o male presented 9/19 from SNF with UTI and hypotension.    CXR 9/20:  \"Patchy bilateral airspace opacities suggestive of multifocal pneumonitis.\"     Subjective  Patient cleared by RN for session. Per pt and RN pt tolerating RG7/TN0. Pt received awake, in good spirits and agreeable to participate in session.       Assessment  Patient seen on this date for dysphagia management. Pt had finished breakfast, though was agreeable to PO snack of juice (8oz) and candie cracker with peanut butter. Pt able to self-feed without difficulty. Adequate bite, mastication of solids. No cough/throat clearing with PO intake. Vocal quality remained clear. No overt s/sx of aspiration appreciated.       Clinical Impressions  Patient presents with a functional swallow. Diet modification is not indicated. Service will no longer actively follow, re-consult with any change in status.         Recommendations      Dysphagia Treatment  Diet Consistency: Regular solids (RG7), thin liquids (TN0)  Instrumentation: None indicated at this time  Medication: As tolerated  Supervision: Encourage self-feeding, Assist with meal tray set up  Positioning: Fully upright and midline during oral intake  Risk Management : Small bites/sips, Slow rate of intake  Oral Care: BID              Anticipated Discharge Needs  Discharge Recommendations: Anticipate that the patient will have no further speech therapy needs after discharge from the hospital  Therapy Recommendations Upon DC: Patient / Family / Caregiver Education      Patient / Family Goals  Patient / Family Goal #1: \"I really love steak!\"  Goal #1 Outcome: Goal met  Short Term Goals  Short Term Goal # 1: Patient will consume a RG/TN diet with no overt s/sx of aspiration.  Goal Outcome # 1: Goal " met      Lynn Mckenzie MS,CCC-SLP

## 2023-09-25 NOTE — PROGRESS NOTES
[facility] Internal Medicine Daily Progress Note    Date of Service  9/25/2023    [facility] Team: [facility] IM Purple Team   Attending: Minh Gutierrez M.d.  Senior Resident: Dr. Masood Correa  Intern:  Dr. Mara Albarado  Contact Number: [phone]    Chief Complaint  Trev Carl is a 88 y.o. male admitted 9/19/2023 with UTI complicated with sepsis.     Hospital Course      Trev Carl is a 88 y.o. male with cognitive decline who presented 9/19/2023 with a urinary tract infection and hypotension from [facility].  Reportedly he had recently been diagnosed with the UTI two days prior and was on oral antibiotics but despite this was found to be hypotensive at the [facility] care facility.  He was given 2L of NS and then sent to [facility] for further care.  The patient has poor recall of recent events but states he normally lives with his grandson but has been at the [facility] recently. He received empiric ceftriaxone for his UTI complicated with sepsis. Patient was transferred to medical floor for further management. His urine culture from 9/17/2023 grew Klebsiella pneumoniae ESBL. We switched ceftriaxone to meropenem. He had episode of hypotension and received 1L bolus of LR. Patient MAP was less than 60 and unable to maintain blood pressure after bolus. Rapid response was called and patient was started on levophed and transferred to [facility]. Patient was then stabilized and transferred back to the floor. Has been continuing treatment with meropenem per ID, with recommendation for suppression with minocycline after meropenem 7 days are completed. Patient also with stent placement and was seen by urology who did not find emergent intervention need. Patient also with afib, question of whether or not to start anticoagulation. History of dementia and has been at baseline since hospitalization.       Interval Problem Update  -Patient with no new concerns today, however is alert and oriented to person only. States the  year is 2022 and unable to name city.   -Patient currently pending SNF placement.     Consultants/Specialty  critical care  Urology  Infectious disease     Code Status  Full Code    Disposition  Medically Cleared  I have placed the appropriate orders for post-discharge needs.    Review of Systems  Review of Systems   Respiratory:  Negative for shortness of breath.    Cardiovascular:  Negative for chest pain and leg swelling.   Gastrointestinal:  Negative for abdominal pain, constipation and diarrhea.   Genitourinary:  Negative for dysuria, hematuria and urgency.   Musculoskeletal:         C/o right knee pain   Neurological:  Negative for dizziness, weakness and headaches.   Psychiatric/Behavioral:  Positive for memory loss.         Physical Exam  Temp:  [36 °C (96.8 °F)-37.1 °C (98.7 °F)] 36 °C (96.8 °F)  Pulse:  [83-99] 83  Resp:  [16-18] 18  BP: (104-121)/(54-62) 104/58  SpO2:  [92 %-99 %] 92 %    Physical Exam  Constitutional:       Appearance: He is not ill-appearing.      Comments: Not oriented to place and situation.   HENT:      Head: Normocephalic and atraumatic.   Cardiovascular:      Rate and Rhythm: Normal rate. Rhythm irregular.      Heart sounds: Normal heart sounds.   Pulmonary:      Effort: Pulmonary effort is normal. No respiratory distress.      Breath sounds: Normal breath sounds. No rales.   Abdominal:      General: There is no distension.      Palpations: Abdomen is soft. There is no mass.      Tenderness: There is no abdominal tenderness. There is no right CVA tenderness, left CVA tenderness, guarding or rebound.   Musculoskeletal:         General: Normal range of motion.      Comments: B/L hand tremors on outstretched hands   Skin:     General: Skin is warm and dry.      Capillary Refill: Capillary refill takes 2 to 3 seconds.      Coloration: Skin is not jaundiced.   Neurological:      Comments: Baseline dementia   Psychiatric:      Comments: Abnormal judgement and recall          Fluids    Intake/Output Summary (Last 24 hours) at 9/25/2023 1355  Last data filed at 9/25/2023 0900  Gross per 24 hour   Intake 1145.86 ml   Output 1250 ml   Net -104.14 ml       Laboratory  Recent Labs     09/23/23  0342 09/25/23  0714   WBC 5.3 9.0   RBC 3.82* 3.10*   HEMOGLOBIN 11.1* 8.9*   HEMATOCRIT 33.9* 28.1*   MCV 88.7 90.6   MCH 29.1 28.7   MCHC 32.7 31.7*   RDW 63.1* 64.0*   PLATELETCT 75* 112*   MPV 11.6 10.8     Recent Labs     09/23/23  0342 09/25/23  0454   SODIUM 141 139   POTASSIUM 4.4 3.8   CHLORIDE 113* 111   CO2 20 19*   GLUCOSE 80 76   BUN 22 15   CREATININE 1.13 0.98   CALCIUM 7.7* 7.3*                     Imaging  EC-ECHOCARDIOGRAM COMPLETE W/O CONT   Final Result      [id number] VIEW   Final Result      1.  Bilateral ureteral stents are in place and appear appropriately positioned.   2.  Nonspecific bowel gas pattern.      DX-CHEST-PORTABLE (1 VIEW)   Final Result      Patchy bilateral airspace opacities suggestive of multifocal pneumonitis.             Assessment/Plan  Problem Representation:    * Septic shock (HCC)  Assessment & Plan  Resolved  Secondary to UTI with, source is likely patient's urinary stents.  Sepsis protocol was initiated  Crystalloid Fluid Administration: Fluid resuscitation ordered per standard protocol was given.  Patient was transferred to [facility] for vasopressors. Bounced back to medical floor on 09/22/23 after blood pressure was normalized.   Per ID  IV Meropenem for 7 days followed by chronic therapy by Minocycline.            Bacteremia  Assessment & Plan  Patient with urine cultures growing ESBL klebsiella pneumoniae.  Per ID, IV Meropenem for 7 days followed by chronic therapy by Minocycline for suppression.        Urinary tract infection due to ESBL Klebsiella  Assessment & Plan  From urine culture from 9/17 grew ESBL  klebsiella bacteremia  Repeat blood culture from 9/21/23 shows no growth.  7 days meropenem then minocycline.      Hyperchloremic  metabolic acidosis  Assessment & Plan  Resolved  2/2 Sepsis and aggressive NS infusion; can use LR if fluid resuscitation is indicated in the future.      Atrial fibrillation (HCC)  Assessment & Plan  Rate controlled, likely reactive in the setting of sepsis, echo with mild pulmonary pretension, TSH normal.   [id number] score 2. Per chart review, family member discussion at care facility decided patient to not be on anticoagulation. While inpatient, patient is on Xarelto. Will call family to clarify anticoagulation wishes and discuss risks vs benefits.         Thrombocytopenia (HCC)  Assessment & Plan  Platelets today at 75, will continue to monitor CBC and assess for signs of mucocutaneous bleeding.      Dementia without behavioral disturbance (HCC)- (present on admission)  Assessment & Plan  Per chart review, patient is at baseline. Patient on memantine. PT/OT evaluation.        Anemia  Assessment & Plan  Chronic stable anemia. No signs of active bleed. Denies lightheadedness or dyspnea today.   F/u CBC     Acute kidney injury (HCC)  Assessment & Plan  resolved  Most likely 2/2 to septic shock and third spacing of fluids.  Bun improving 26, Creatinine at baseline.  CTM renal functions.       VTE prophylaxis: SCDs/TEDs    I have performed a physical exam and reviewed and updated ROS and Plan today (9/25/2023). In review of yesterday's note (9/24/2023), there are no changes except as documented above.

## 2023-09-25 NOTE — CARE PLAN
Problem: Hemodynamics  Goal: Patient's hemodynamics, fluid balance and neurologic status will be stable or improve  Outcome: Progressing     Problem: Urinary - Renal Perfusion  Goal: Ability to achieve and maintain adequate renal perfusion and functioning will improve  Outcome: Progressing     Problem: Skin Integrity  Goal: Skin integrity is maintained or improved  Outcome: Progressing   The patient is Stable - Low risk of patient condition declining or worsening    Shift Goals  Clinical Goals: IV abx, monitor labs, graham care, wound/skin care  Patient Goals: IV ABX, monitor v/s and labs.  Family Goals: MARIVEL    Progress made toward(s) clinical / shift goals:      Patient is not progressing towards the following goals:

## 2023-09-25 NOTE — DISCHARGE PLANNING
Case Management Discharge Planning    Admission Date: 9/19/2023  GMLOS: 5  ALOS: 6    6-Clicks ADL Score: 11  6-Clicks Mobility Score: 7  PT and/or OT Eval ordered: Yes  Post-acute Referrals Ordered: Yes  Post-acute Choice Obtained: Yes  Has referral(s) been sent to post-acute provider:  Yes      Anticipated Discharge Dispo: Discharge Disposition: D/T to SNF with Medicare cert in anticipation of skilled care (03)    DME Needed: No    Action(s) Taken: Choice obtained    LSW called pt grandashley Crabtree about the pt DC plan. LSW updated the pt grandson about the pt being medically cleared to go back to [facility]. Pt mare is agreeable with the pt going back to [facility]. LSW received choice and faxed to the DPA.     Insurance Auth started for [facility].     Escalations Completed: None    Medically Clear: Yes    Next Steps: LSW will continue to assist with discharge as needed.      Barriers to Discharge: Pending Placement and Transportation

## 2023-09-25 NOTE — DISCHARGE PLANNING
Agency/Facility Name: [facility]  Spoke To: Tal   Outcome: DPA called regarding if facility can start ins auth. Tal will get started on ins auth.

## 2023-09-26 LAB
ALBUMIN SERPL BCP-MCNC: 2 G/DL (ref 3.2–4.9)
ALBUMIN/GLOB SERPL: 0.7 G/DL
ALP SERPL-CCNC: 194 U/L (ref 30–99)
ALT SERPL-CCNC: 28 U/L (ref 2–50)
ANION GAP SERPL CALC-SCNC: 10 MMOL/L (ref 7–16)
AST SERPL-CCNC: 39 U/L (ref 12–45)
BILIRUB SERPL-MCNC: 0.7 MG/DL (ref 0.1–1.5)
BUN SERPL-MCNC: 15 MG/DL (ref 8–22)
CALCIUM ALBUM COR SERPL-MCNC: 9 MG/DL (ref 8.5–10.5)
CALCIUM SERPL-MCNC: 7.4 MG/DL (ref 8.5–10.5)
CHLORIDE SERPL-SCNC: 108 MMOL/L (ref 96–112)
CO2 SERPL-SCNC: 20 MMOL/L (ref 20–33)
CREAT SERPL-MCNC: 0.98 MG/DL (ref 0.5–1.4)
ERYTHROCYTE [DISTWIDTH] IN BLOOD BY AUTOMATED COUNT: 64.1 FL (ref 35.9–50)
GFR SERPLBLD CREATININE-BSD FMLA CKD-EPI: 74 ML/MIN/1.73 M 2
GLOBULIN SER CALC-MCNC: 2.7 G/DL (ref 1.9–3.5)
GLUCOSE SERPL-MCNC: 84 MG/DL (ref 65–99)
HCT VFR BLD AUTO: 29.2 % (ref 42–52)
HGB BLD-MCNC: 9.1 G/DL (ref 14–18)
MCH RBC QN AUTO: 28.3 PG (ref 27–33)
MCHC RBC AUTO-ENTMCNC: 31.2 G/DL (ref 32.3–36.5)
MCV RBC AUTO: 90.7 FL (ref 81.4–97.8)
PLATELET # BLD AUTO: 119 K/UL (ref 164–446)
PMV BLD AUTO: 10.7 FL (ref 9–12.9)
POTASSIUM SERPL-SCNC: 3.7 MMOL/L (ref 3.6–5.5)
PROT SERPL-MCNC: 4.7 G/DL (ref 6–8.2)
RBC # BLD AUTO: 3.22 M/UL (ref 4.7–6.1)
SODIUM SERPL-SCNC: 138 MMOL/L (ref 135–145)
WBC # BLD AUTO: 9.5 K/UL (ref 4.8–10.8)

## 2023-09-26 PROCEDURE — 700102 HCHG RX REV CODE 250 W/ 637 OVERRIDE(OP): Performed by: HOSPITALIST

## 2023-09-26 PROCEDURE — 770001 HCHG ROOM/CARE - MED/SURG/GYN PRIV*

## 2023-09-26 PROCEDURE — 700102 HCHG RX REV CODE 250 W/ 637 OVERRIDE(OP): Mod: JZ

## 2023-09-26 PROCEDURE — 36415 COLL VENOUS BLD VENIPUNCTURE: CPT

## 2023-09-26 PROCEDURE — 99232 SBSQ HOSP IP/OBS MODERATE 35: CPT | Mod: GC | Performed by: INTERNAL MEDICINE

## 2023-09-26 PROCEDURE — A9270 NON-COVERED ITEM OR SERVICE: HCPCS | Mod: JZ

## 2023-09-26 PROCEDURE — 97602 WOUND(S) CARE NON-SELECTIVE: CPT

## 2023-09-26 PROCEDURE — A9270 NON-COVERED ITEM OR SERVICE: HCPCS

## 2023-09-26 PROCEDURE — 700102 HCHG RX REV CODE 250 W/ 637 OVERRIDE(OP)

## 2023-09-26 PROCEDURE — 700111 HCHG RX REV CODE 636 W/ 250 OVERRIDE (IP): Performed by: INTERNAL MEDICINE

## 2023-09-26 PROCEDURE — A9270 NON-COVERED ITEM OR SERVICE: HCPCS | Performed by: HOSPITALIST

## 2023-09-26 PROCEDURE — 85027 COMPLETE CBC AUTOMATED: CPT

## 2023-09-26 PROCEDURE — 80053 COMPREHEN METABOLIC PANEL: CPT

## 2023-09-26 PROCEDURE — 700105 HCHG RX REV CODE 258: Performed by: INTERNAL MEDICINE

## 2023-09-26 RX ORDER — POTASSIUM CHLORIDE 20 MEQ/1
20 TABLET, EXTENDED RELEASE ORAL ONCE
Status: COMPLETED | OUTPATIENT
Start: 2023-09-26 | End: 2023-09-26

## 2023-09-26 RX ADMIN — TAMSULOSIN HYDROCHLORIDE 0.4 MG: 0.4 CAPSULE ORAL at 16:37

## 2023-09-26 RX ADMIN — MEROPENEM 500 MG: 500 INJECTION, POWDER, FOR SOLUTION INTRAVENOUS at 00:36

## 2023-09-26 RX ADMIN — POTASSIUM CHLORIDE 20 MEQ: 1500 TABLET, EXTENDED RELEASE ORAL at 08:33

## 2023-09-26 RX ADMIN — MEROPENEM 500 MG: 500 INJECTION, POWDER, FOR SOLUTION INTRAVENOUS at 12:46

## 2023-09-26 RX ADMIN — OXYBUTYNIN CHLORIDE 5 MG: 5 TABLET, EXTENDED RELEASE ORAL at 06:02

## 2023-09-26 RX ADMIN — MEMANTINE HYDROCHLORIDE 5 MG: 10 TABLET ORAL at 06:02

## 2023-09-26 RX ADMIN — MEROPENEM 500 MG: 500 INJECTION, POWDER, FOR SOLUTION INTRAVENOUS at 06:08

## 2023-09-26 RX ADMIN — OMEPRAZOLE 20 MG: 20 CAPSULE, DELAYED RELEASE ORAL at 06:03

## 2023-09-26 RX ADMIN — SENNOSIDES AND DOCUSATE SODIUM 2 TABLET: 50; 8.6 TABLET ORAL at 16:36

## 2023-09-26 RX ADMIN — RIVAROXABAN 10 MG: 10 TABLET, FILM COATED ORAL at 16:36

## 2023-09-26 RX ADMIN — SENNOSIDES AND DOCUSATE SODIUM 2 TABLET: 50; 8.6 TABLET ORAL at 06:09

## 2023-09-26 ASSESSMENT — PATIENT HEALTH QUESTIONNAIRE - PHQ9
2. FEELING DOWN, DEPRESSED, IRRITABLE, OR HOPELESS: NOT AT ALL
1. LITTLE INTEREST OR PLEASURE IN DOING THINGS: NOT AT ALL
1. LITTLE INTEREST OR PLEASURE IN DOING THINGS: NOT AT ALL
SUM OF ALL RESPONSES TO PHQ9 QUESTIONS 1 AND 2: 0
2. FEELING DOWN, DEPRESSED, IRRITABLE, OR HOPELESS: NOT AT ALL
SUM OF ALL RESPONSES TO PHQ9 QUESTIONS 1 AND 2: 0

## 2023-09-26 ASSESSMENT — ENCOUNTER SYMPTOMS
MYALGIAS: 0
ABDOMINAL PAIN: 0
MEMORY LOSS: 1

## 2023-09-26 ASSESSMENT — PAIN DESCRIPTION - PAIN TYPE: TYPE: ACUTE PAIN

## 2023-09-26 NOTE — CARE PLAN
Problem: Knowledge Deficit - Standard  Goal: Patient and family/care givers will demonstrate understanding of plan of care, disease process/condition, diagnostic tests and medications  Outcome: Progressing     Problem: Urinary - Renal Perfusion  Goal: Ability to achieve and maintain adequate renal perfusion and functioning will improve  Outcome: Progressing     Problem: Skin Integrity  Goal: Skin integrity is maintained or improved  Outcome: Progressing   The patient is Stable - Low risk of patient condition declining or worsening    Shift Goals  Clinical Goals: IV ABX,safety, monitor v/s and labs.  Patient Goals: Comfort  Family Goals: MARIVEL    Progress made toward(s) clinical / shift goals:       Patient is not progressing towards the following goals:

## 2023-09-26 NOTE — DISCHARGE PLANNING
Agency/Facility Name: [facility]  Spoke To: Tal   Outcome: DPA called to follow up on ins auth, per Tal no updates yet still pending.

## 2023-09-26 NOTE — WOUND TEAM
[facility]  Inpatient Services  Wound and Skin Care Follow-up    Admission Date: 9/19/2023     Last order of IP CONSULT TO WOUND CARE was found on 9/20/2023 from Hospital Encounter on 9/19/2023     HPI, PMH, SH: Reviewed    History reviewed. No pertinent surgical history.  Social History     Tobacco Use    Smoking status: Never    Smokeless tobacco: Never   Substance Use Topics    Alcohol use: Not on file     Chief Complaint   Patient presents with    Hypotension     Pt BIBA from [facility]. Pt noted to be hypotensive, systolic in the 80s.  Pt received 2L NS from outlying facility and sent here. Pt denies dizziness at this time.     Abnormal Labs     Pt has blood work performed at [facility] last night and results came back with potassium of 2.8 and WBC count of 17.  Pt received 60mcg potassium replacement at the [facility].      Diagnosis: Sepsis (HCC) [A41.9]    Unit where seen by Wound Team: S171/02     WOUND FOLLOW UP RELATED TO:  sacrum        WOUND TEAM PLAN OF CARE - Frequency of Follow-up:   Nursing to follow dressing orders written for wound care. Contact wound team if area fails to progress, deteriorates or with any questions/concerns if something comes up before next scheduled follow up (See below as to whether wound is following and frequency of wound follow up)  Dressing changes by wound team:                   Weekly - sacrum     WOUND HISTORY:       Brought from [facility], noted to be hypotensive.        WOUND ASSESSMENT/LDA       Wound 09/20/23 Pressure Injury Sacrum POA unstageable (Active)   Wound Image   09/26/23 1000   Site Assessment Yellow;Red;[place] 09/26/23 1000   Periwound Assessment Pink;Red;Fragile 09/26/23 1000   Margins Defined edges;Attached edges 09/26/23 1000   Closure Secondary intention 09/26/23 1000   Drainage Amount Scant 09/26/23 1000   Drainage Description Sanguineous 09/26/23 1000   Treatments Cleansed;Site care 09/26/23 1000   Offloading/DME Other (comment) 09/22/23 2200   Wound  Cleansing Foam Cleanser/Washcloth 09/26/23 1000   Periwound Protectant Barrier Paste;Viscopaste 09/26/23 1000   Dressing Status Clean;Dry;Intact 09/26/23 1000   Dressing Changed Changed 09/26/23 1000   Dressing Cleansing/Solutions Not Applicable 09/26/23 1000   Dressing Options Viscopaste 09/26/23 1000   Dressing Change/Treatment Frequency Every Shift, and As Needed 09/26/23 1000   NEXT Dressing Change/Treatment Date 09/26/23 09/25/23 0723   NEXT Weekly Photo (Inpatient Only) 09/28/23 09/21/23 1216   Wound Team Following Weekly 09/21/23 1216   WOUND NURSE ONLY - Pressure Injury Stage U 09/21/23 1216   Non-staged Wound Description Not applicable 09/21/23 1216   Shape irregular 09/21/23 1216   Wound Odor None 09/21/23 1216   WOUND NURSE ONLY - Time Spent with Patient (mins) 30 09/21/23 1216            Vascular:    FIONA:   No results found.    Lab Values:    Lab Results   Component Value Date/Time    WBC 9.5 09/26/2023 03:22 AM    RBC 3.22 (L) 09/26/2023 03:22 AM    HEMOGLOBIN 9.1 (L) 09/26/2023 03:22 AM    HEMATOCRIT 29.2 (L) 09/26/2023 03:22 AM         Culture Results show:  No results found for this or any previous visit (from the past 720 hour(s)).    Pain Level/Medicated:  None, Tolerated without pain medication       INTERVENTIONS BY WOUND TEAM:  Chart and images reviewed. Discussed with bedside RN. All areas of concern (based on picture review, LDA review and discussion with bedside RN) have been thoroughly assessed. Documentation of areas based on significant findings. This RN in to assess patient. Performed standard wound care which includes appropriate positioning, dressing removal and non-selective debridement. Pictures and measurements obtained weekly if/when required.    Wound:  Sacrum   Preparation for Dressing removal: Removed without difficulty  Cleansed/Non-selectively Debrided with:  Moist warm washcloth  Sarah wound: Cleansed with Moist warm washcloth, Prepped with Barrier paste  Primary Dressing:   viscopaste patch    Advanced Wound Care Discharge Planning  Number of Clinicians necessary to complete wound care: 1  Is patient requiring IV pain medications for dressing changes:  No   Length of time for dressing change 10 min. (This does not include chart review, pre-medication time, set up, clean up or time spent charting.)    Interdisciplinary consultation: Patient, Bedside RN, Delia SCHAEFER (Wound RN).  Pressure injury and staging reviewed with N/A.    EVALUATION / RATIONALE FOR TREATMENT:     Date:  09/26/23  Wound Status:  Wound progressing as expected    Patient sacrum with unstageable pressure injury present. Wound is evolving as expected. Bedside Rn's to continue with viscopaste and barrier paste at this time.   Date:  09/21/23  Wound Status:  Initial evaluation    Patient with unstageable pressure injury to sacrum with surrounding dermatitis. Slough obscures majority of wound bed. Pt incontinent of stool thus barrier paste applied.          Goals: Steady decrease in wound area and depth weekly.    NURSING PLAN OF CARE ORDERS:  No new orders this visit    NUTRITION RECOMMENDATIONS   Wound Team Recommendations:  N/A     DIET ORDERS (From admission to next 24h)       Start     Ordered    09/25/23 1331  Diet Order Diet: Regular  ALL MEALS        Question:  Diet:  Answer:  Regular    09/25/23 1330                    PREVENTATIVE INTERVENTIONS:   Q shift Lawrence - performed per nursing policy  Q shift pressure point assessments - performed per nursing policy    Surface/Positioning  Low Airloss - Currently in Place  Reposition q 2 hours - Currently in Place  TAPs Turning system - Currently in Place    Offloading/Redistribution  Heel offloading dressing (Silicone dressing) - Currently in Place  Float Heels off Bed with Pillows - Currently in Place           Containment/Moisture Prevention    Purwick/Condom Cath - Currently in Place  Barrier paste - Applied this Visit  Viscopaste - Applied this Visit    Anticipated  discharge plans:  TBD        Vac Discharge Needs:  Vac Discharge plan is purely a recommendation from wound team and not a requirement for discharge unless otherwise stated by physician.  Not Applicable Pt not on a wound vac

## 2023-09-26 NOTE — PROGRESS NOTES
The skin of the right groin and right arm was clipped, prepped and draped in the usual sterile manner. (If not otherwise specified, skin prep was bilateral.)  [facility] Internal Medicine Daily Progress Note    Date of Service  9/26/2023    [facility] Team: [facility] IM Purple Team   Attending: Minh Gutierrez M.d.  Senior Resident: Dr. Masood Correa  Intern:  Dr. Mara Albarado  Contact Number: [phone]    Chief Complaint  Trev Carl is a 88 y.o. male admitted 9/19/2023 with UTI complicated with sepsis.     Hospital Course      Trev Carl is a 88 y.o. male with cognitive decline who presented 9/19/2023 with a urinary tract infection and hypotension from [facility].  Reportedly he had recently been diagnosed with the UTI two days prior and was on oral antibiotics but despite this was found to be hypotensive at the [facility] facility.  He was given 2L of NS and then sent to [facility] for further care.  The patient has poor recall of recent events but states he normally lives with his grandson but has been at the [facility] recently. He received empiric ceftriaxone for his UTI complicated with sepsis. Patient was transferred to medical floor for further management. His urine culture from 9/17/2023 grew Klebsiella pneumoniae ESBL. We switched ceftriaxone to meropenem. He had episode of hypotension and received 1L bolus of LR. Patient MAP was less than 60 and unable to maintain blood pressure after bolus. Rapid response was called and patient was started on levophed and transferred to [facility]. Patient was then stabilized and transferred back to the floor. Has been continuing treatment with meropenem per ID, with recommendation for suppression with minocycline after meropenem 7 days are completed. Patient also with stent placement and was seen by urology who did not find emergent intervention need. Patient also with afib, question of whether or not to start anticoagulation. History of dementia and has been at baseline since hospitalization.       Interval Problem Update  -Patient today sleeping but arousable, denied any concerns or complaints today.   -Patient  currently pending SNF placement.   -Discussed with patient's grandson in chart, Bro Chiu, risks vs benefits of anticoagulation for atrial fibrillation, patient's grandson would like to have patient on anticoagulation and does not feel he has a fall risk at the SNF with care he receives there, will continue anticoagulation at this time.     Consultants/Specialty  critical care  Urology  Infectious disease     Code Status  Full Code    Disposition  The patient is medically cleared for discharge to home or a post-acute facility.      I have placed the appropriate orders for post-discharge needs.    Review of Systems  Review of Systems   Cardiovascular:  Negative for chest pain.   Gastrointestinal:  Negative for abdominal pain.   Genitourinary:  Negative for dysuria.   Musculoskeletal:  Negative for myalgias.        C/o right knee pain   Psychiatric/Behavioral:  Positive for memory loss.         Physical Exam  Temp:  [36.3 °C (97.3 °F)-36.7 °C (98.1 °F)] 36.4 °C (97.6 °F)  Pulse:  [81-92] 92  Resp:  [18] 18  BP: ()/(48-99) 97/59  SpO2:  [91 %-97 %] 93 %    Physical Exam  Constitutional:       Appearance: He is not ill-appearing.      Comments: Not oriented to place and situation.   HENT:      Head: Normocephalic and atraumatic.      Right Ear: External ear normal.      Left Ear: External ear normal.      Nose: Nose normal.   Cardiovascular:      Rate and Rhythm: Normal rate. Rhythm irregular.      Heart sounds: Normal heart sounds.   Pulmonary:      Effort: Pulmonary effort is normal. No respiratory distress.      Breath sounds: Normal breath sounds. No rales.   Abdominal:      General: There is no distension.      Palpations: Abdomen is soft. There is no mass.      Tenderness: There is no abdominal tenderness. There is no right CVA tenderness, left CVA tenderness, guarding or rebound.   Musculoskeletal:         General: Normal range of motion.      Comments: B/L hand tremors on outstretched hands    Skin:     General: Skin is warm and dry.      Capillary Refill: Capillary refill takes 2 to 3 seconds.      Coloration: Skin is not jaundiced.   Neurological:      Comments: Baseline dementia   Psychiatric:      Comments: Abnormal judgement and recall         Fluids    Intake/Output Summary (Last 24 hours) at 9/26/2023 1428  Last data filed at 9/26/2023 1000  Gross per 24 hour   Intake 1098 ml   Output 1300 ml   Net -202 ml         Laboratory  Recent Labs     09/25/23  0714 09/26/23  0322   WBC 9.0 9.5   RBC 3.10* 3.22*   HEMOGLOBIN 8.9* 9.1*   HEMATOCRIT 28.1* 29.2*   MCV 90.6 90.7   MCH 28.7 28.3   MCHC 31.7* 31.2*   RDW 64.0* 64.1*   PLATELETCT 112* 119*   MPV 10.8 10.7       Recent Labs     09/25/23  0454 09/26/23  0520   SODIUM 139 138   POTASSIUM 3.8 3.7   CHLORIDE 111 108   CO2 19* 20   GLUCOSE 76 84   BUN 15 15   CREATININE 0.98 0.98   CALCIUM 7.3* 7.4*                       Imaging  EC-ECHOCARDIOGRAM COMPLETE W/O CONT   Final Result      [id number] VIEW   Final Result      1.  Bilateral ureteral stents are in place and appear appropriately positioned.   2.  Nonspecific bowel gas pattern.      DX-CHEST-PORTABLE (1 VIEW)   Final Result      Patchy bilateral airspace opacities suggestive of multifocal pneumonitis.             Assessment/Plan  Problem Representation:    * Septic shock (HCC)  Assessment & Plan  Resolved  Secondary to UTI with, source is likely patient's urinary stents.  Patient was transferred to [facility] for vasopressors. Bounced back to medical floor on 09/22/23 after blood pressure was normalized.   Per ID IV Meropenem for 7 days followed by chronic therapy by Minocycline.    -Meropenem day 7/7, starting minocycline 100mg q12h in am.      Bacteremia  Assessment & Plan  Patient with urine cultures growing ESBL klebsiella pneumoniae.  Per ID, IV Meropenem for 7 days, last day today, followed by chronic therapy by Minocycline for suppression starting 9/27.      Urinary tract infection due to  ESBL Klebsiella  Assessment & Plan  From urine culture from 9/17 grew ESBL  klebsiella bacteremia  Repeat blood culture from 9/21/23 shows no growth.  7 days meropenem then minocycline.      Hyperchloremic metabolic acidosis  Assessment & Plan  Resolved  2/2 Sepsis and aggressive NS infusion; can use LR if fluid resuscitation is indicated in the future.      Atrial fibrillation (HCC)  Assessment & Plan  Rate controlled, likely reactive in the setting of sepsis, echo with mild pulmonary pretension, TSH normal.   [id number] score 2. Per chart review, family member discussion at care facility decided patient to not be on anticoagulation. While inpatient, patient is on Xarelto. Attempted to call grandson in chart on 9/25 without answer, voicemail left.      Thrombocytopenia (HCC)  Assessment & Plan  Platelets today at 119, improved from day prior. Will continue to assess for signs of mucocutaneous bleeding.      Dementia without behavioral disturbance (HCC)- (present on admission)  Assessment & Plan  Per chart review, patient is at baseline. Patient on memantine. PT/OT evaluation.      Anemia  Assessment & Plan  Chronic stable anemia. No signs of active bleed and patient has been asymptomatic.     Acute kidney injury (HCC)  Assessment & Plan  resolved  Most likely 2/2 to septic shock and third spacing of fluids.  Bun improving 26, Creatinine at baseline.  CTM renal functions.       VTE prophylaxis: SCDs/TEDs    I have performed a physical exam and reviewed and updated ROS and Plan today (9/26/2023). In review of yesterday's note (9/25/2023), there are no changes except as documented above.

## 2023-09-26 NOTE — CARE PLAN
The patient is Stable - Low risk of patient condition declining or worsening    Shift Goals  Clinical Goals: IV ABX,safety, monitor v/s and labs.  Patient Goals: Comfort  Family Goals: MARIVEL    Progress made toward(s) clinical / shift goals:    Problem: Skin Integrity  Goal: Skin integrity is maintained or improved  Outcome: Progressing  Note: Monitor skin color and appearance, follow wound care, monitor any signs of infection.     Problem: Fall Risk  Goal: Patient will remain free from falls  Outcome: Progressing  Note: Hourly rounding in place, bed alarm is on, keep bed in lowest position, maintain safety precautions.       Patient is not progressing towards the following goals:      Problem: Knowledge Deficit - Standard  Goal: Patient and family/care givers will demonstrate understanding of plan of care, disease process/condition, diagnostic tests and medications  Outcome: Not Progressing  Note: Patient will have better understanding about his disease process, treatment plans and medications. Patient remains to be intermittently confused.

## 2023-09-27 VITALS
HEIGHT: 70 IN | TEMPERATURE: 96.7 F | DIASTOLIC BLOOD PRESSURE: 73 MMHG | OXYGEN SATURATION: 97 % | BODY MASS INDEX: 28.82 KG/M2 | RESPIRATION RATE: 16 BRPM | HEART RATE: 100 BPM | SYSTOLIC BLOOD PRESSURE: 108 MMHG | WEIGHT: 201.28 LBS

## 2023-09-27 LAB
BACTERIA BLD CULT: NORMAL
BACTERIA BLD CULT: NORMAL
SIGNIFICANT IND 70042: NORMAL
SIGNIFICANT IND 70042: NORMAL
SITE SITE: NORMAL
SITE SITE: NORMAL
SOURCE SOURCE: NORMAL
SOURCE SOURCE: NORMAL

## 2023-09-27 PROCEDURE — A9270 NON-COVERED ITEM OR SERVICE: HCPCS | Performed by: INTERNAL MEDICINE

## 2023-09-27 PROCEDURE — 700102 HCHG RX REV CODE 250 W/ 637 OVERRIDE(OP): Performed by: INTERNAL MEDICINE

## 2023-09-27 PROCEDURE — A9270 NON-COVERED ITEM OR SERVICE: HCPCS

## 2023-09-27 PROCEDURE — A9270 NON-COVERED ITEM OR SERVICE: HCPCS | Performed by: HOSPITALIST

## 2023-09-27 PROCEDURE — 700102 HCHG RX REV CODE 250 W/ 637 OVERRIDE(OP)

## 2023-09-27 PROCEDURE — 700102 HCHG RX REV CODE 250 W/ 637 OVERRIDE(OP): Performed by: HOSPITALIST

## 2023-09-27 PROCEDURE — 99239 HOSP IP/OBS DSCHRG MGMT >30: CPT | Performed by: INTERNAL MEDICINE

## 2023-09-27 RX ORDER — MINOCYCLINE HYDROCHLORIDE 100 MG/1
100 CAPSULE ORAL EVERY 12 HOURS
Qty: 60 CAPSULE | Refills: 0 | Status: ACTIVE
Start: 2023-09-27

## 2023-09-27 RX ADMIN — OXYBUTYNIN CHLORIDE 5 MG: 5 TABLET, EXTENDED RELEASE ORAL at 06:30

## 2023-09-27 RX ADMIN — MINOCYCLINE HYDROCHLORIDE 100 MG: 50 CAPSULE ORAL at 06:31

## 2023-09-27 RX ADMIN — MEMANTINE HYDROCHLORIDE 5 MG: 10 TABLET ORAL at 06:30

## 2023-09-27 RX ADMIN — SENNOSIDES AND DOCUSATE SODIUM 2 TABLET: 50; 8.6 TABLET ORAL at 06:31

## 2023-09-27 RX ADMIN — OMEPRAZOLE 20 MG: 20 CAPSULE, DELAYED RELEASE ORAL at 06:30

## 2023-09-27 NOTE — CARE PLAN
The patient is Stable - Low risk of patient condition declining or worsening    Shift Goals  Clinical Goals: Monitor labs, v/s and intake and output  Patient Goals: Rest  Family Goals: MARIVEL    Progress made toward(s) clinical / shift goals:    Problem: Hemodynamics  Goal: Patient's hemodynamics, fluid balance and neurologic status will be stable or improve  Outcome: Progressing  Note: Monitor vital signs, laboratory result, intake and output. Assess for any changes in patient's  mentation.     Problem: Skin Integrity  Goal: Skin integrity is maintained or improved  Outcome: Progressing  Note: Follow wound care per protocol, maintain good skin hygiene.     Problem: Fall Risk  Goal: Patient will remain free from falls  Outcome: Progressing  Note: Bed alarm is on, hourly rounding in place, keep call light and personal belongings within patient's reach.Maintain safety precautions at all times.       Patient is not progressing towards the following goals:

## 2023-09-27 NOTE — DISCHARGE PLANNING
DC Transport Scheduled    Received request at: [employer] Scheduled:  1200  Spoke with Rosa at [employer] to schedule transport.    Scheduled Date: 9/27/2023  Scheduled Time: 1200    Destination: [facility] [address] [place]    Notified care team of scheduled transport via [employer].     If there are any changes needed to the DC transportation scheduled, please contact [facility] at ext. [phone] between the hours of 2604-7071 Mon-Fri. If outside those hours, contact the ED Case Manager at ext. [phone].

## 2023-09-27 NOTE — DISCHARGE PLANNING
Agency/Facility Name: [facility]  Spoke To: Tal  Outcome: DPA received call per Tal ins auth has been approved, DPA will set up transport and will call back facility with a confirmed time.     DPA left v-mail regarding confirmation on transport.

## 2023-09-27 NOTE — CARE PLAN
Problem: Knowledge Deficit - Standard  Goal: Patient and family/care givers will demonstrate understanding of plan of care, disease process/condition, diagnostic tests and medications  Outcome: Progressing  Note: Dc plans to [facility]     Problem: Hemodynamics  Goal: Patient's hemodynamics, fluid balance and neurologic status will be stable or improve  Outcome: Progressing  Note: Monitor labs and vital signs   The patient is Stable - Low risk of patient condition declining or worsening    Shift Goals  Clinical Goals: Monitor labs, v/s and intake and output  Patient Goals: Rest  Family Goals: MARIVEL    Progress made toward(s) clinical / shift goals:  dc plans    Patient is not progressing towards the following goals:

## 2023-09-27 NOTE — PROGRESS NOTES
Received in bed sleeping with no s/s of discomfort, aaox3, FC to dd with harika UO, POC discussed, needs attended.

## 2023-09-27 NOTE — DISCHARGE SUMMARY
[facility] Internal Medicine Discharge Summary    Attending: Minh Gutierrez M.d.  Senior Resident: Dr. Masood Alvarez  Intern:  Dr. Mara Albarado  Contact Number: [phone]    CHIEF COMPLAINT ON ADMISSION  Chief Complaint   Patient presents with    Hypotension     Pt BIBA from [facility]. Pt noted to be hypotensive, systolic in the 80s.  Pt received 2L NS from outlying facility and sent here. Pt denies dizziness at this time.     Abnormal Labs     Pt has blood work performed at [facility] last night and results came back with potassium of 2.8 and WBC count of 17.  Pt received 60mcg potassium replacement at the [facility].        Reason for Admission  EMS     Admission Date  9/19/2023    CODE STATUS  Full Code    HPI & HOSPITAL COURSE  This is a 88 y.o. male here with Urinary tract infection, Sepsis   Trev Carl is a 88 y.o. male with cognitive decline who presented 9/19/2023 with a urinary tract infection and hypotension from [facility].  Reportedly he had recently been diagnosed with the UTI two days prior and was on oral antibiotics but despite this was found to be hypotensive at the [facility] care facility.  He was given 2L of NS and then sent to [facility] for further care.  The patient has poor recall of recent events but states he normally lives with his grandson but has been at the [facility] recently. He received empiric ceftriaxone for his UTI complicated with sepsis. Patient was transferred to medical floor for further management. His urine culture from 9/17/2023 grew Klebsiella pneumoniae ESBL. We switched ceftriaxone to meropenem. He had episode of hypotension and received 1L bolus of LR. Patient MAP was less than 60 and unable to maintain blood pressure after bolus. Rapid response was called and patient was started on levophed and transferred to [facility]. Patient was then stabilized and transferred back to the floor. Has been continuing treatment with meropenem per ID, with recommendation for suppression  with minocycline after meropenem 7 days are completed. Patient also with stent placement and was seen by urology who did not find emergent intervention need. Patient also with afib, question of whether or not to start anticoagulation. History of dementia and has been at baseline since hospitalization.       Therefore, he is discharged in guarded and stable condition to skilled nursing facility. SARBJIT    The patient met 2-midnight criteria for an inpatient stay at the time of discharge.    Discharge Date  9/27/23    Physical Exam on Day of Discharge  Physical Exam  Vitals and nursing note reviewed.   Constitutional:       Appearance: He is not ill-appearing.      Comments: Not oriented to place and situation.   HENT:      Head: Normocephalic and atraumatic.      Right Ear: External ear normal.      Left Ear: External ear normal.      Nose: Nose normal.   Cardiovascular:      Rate and Rhythm: Normal rate. Rhythm irregular.      Heart sounds: Normal heart sounds.   Pulmonary:      Effort: Pulmonary effort is normal. No respiratory distress.      Breath sounds: Normal breath sounds. No rales.   Abdominal:      General: There is no distension.      Palpations: Abdomen is soft. There is no mass.      Tenderness: There is no abdominal tenderness. There is no right CVA tenderness, left CVA tenderness, guarding or rebound.   Musculoskeletal:         General: Normal range of motion.      Comments: B/L hand tremors on outstretched hands   Skin:     General: Skin is warm and dry.      Capillary Refill: Capillary refill takes 2 to 3 seconds.      Coloration: Skin is not jaundiced.   Neurological:      Comments: Baseline dementia   Psychiatric:      Comments: Abnormal judgement and recall         FOLLOW UP ITEMS POST DISCHARGE  Anticoagulation management; patient and family wish for anticoagulation at this time after discussions regarding risk/benefit.    DISCHARGE DIAGNOSES  Principal Problem:    Septic shock (HCC) (POA:  Unknown)  Active Problems:    Dementia without behavioral disturbance (HCC) (POA: Yes)    Acute kidney injury (HCC) (POA: Unknown)    Anemia (POA: Unknown)    Urinary tract infection due to ESBL Klebsiella (POA: Unknown)    Thrombocytopenia (HCC) (POA: Unknown)    Bacteremia (POA: Unknown)    Atrial fibrillation (HCC) (POA: Unknown)    Hyperchloremic metabolic acidosis (POA: Unknown)  Resolved Problems:    * No resolved hospital problems. *      FOLLOW UP  No future appointments.  [facility]  [address]  [place] [address]  [phone]        [facility]  [address]  [place] [address]  [phone]    Follow up        MEDICATIONS ON DISCHARGE     Medication List        START taking these medications        Instructions   apixaban 5mg Tabs  Commonly known as: Eliquis   Take 1 Tablet by mouth 2 times a day.  Dose: 5 mg     minocycline 100 MG Caps  Commonly known as: Minocin   Take 1 Capsule by mouth every 12 hours.  Dose: 100 mg            CHANGE how you take these medications        Instructions   terazosin 5 MG Caps  What changed: Another medication with the same name was removed. Continue taking this medication, and follow the directions you see here.  Commonly known as: Hytrin   Take 5 mg by mouth every evening.  Dose: 5 mg            CONTINUE taking these medications        Instructions   guaiFENesin 100 MG/5ML liquid  Commonly known as: Robitussin   Take 10 mL by mouth every four hours as needed for Cough.  Dose: 10 mL     melatonin 3 MG Tabs   Take 3 mg by mouth at bedtime.  Dose: 3 mg     memantine 5 MG Tabs  Commonly known as: Namenda   Take 5 mg by mouth every day.  Dose: 5 mg     nystatin 060722 UNIT/ML Susp  Commonly known as: Mycostatin   Take 500,000 Units by mouth 4 times a day.  Dose: 500,000 Units     omeprazole 20 MG delayed-release capsule  Commonly known as: PriLOSEC   Take 20 mg by mouth every day.  Dose: 20 mg     oxybutynin SR 5 MG Tb24  Commonly known as:  Ditropan-XL   Take 5 mg by mouth every day.  Dose: 5 mg     potassium chloride SA 20 MEQ Tbcr  Commonly known as: Kdur   Take 60 mEq by mouth every day. 20 meq x 3 tablets = 60 meq  Dose: 60 mEq     senna-docusate 8.6-50 MG Tabs  Commonly known as: Pericolace Or Senokot S   Take 1 Tablet by mouth 2 times a day.  Dose: 1 Tablet     tamsulosin 0.4 MG capsule  Commonly known as: Flomax   Take 0.4 mg by mouth every evening.  Dose: 0.4 mg            STOP taking these medications      NS Soln              Allergies  No Known Allergies    DIET  Orders Placed This Encounter   Procedures    Diet Order Diet: Regular     Standing Status:   Standing     Number of Occurrences:   1     Order Specific Question:   Diet:     Answer:   Regular [1]       ACTIVITY  As tolerated and directed by skilled nursing.  Weight bearing as tolerated    CONSULTATIONS  Infectious disease, Urology, Critical care    PROCEDURES  N/a    LABORATORY  Lab Results   Component Value Date    SODIUM 138 09/26/2023    POTASSIUM 3.7 09/26/2023    CHLORIDE 108 09/26/2023    CO2 20 09/26/2023    GLUCOSE 84 09/26/2023    BUN 15 09/26/2023    CREATININE 0.98 09/26/2023        Lab Results   Component Value Date    WBC 9.5 09/26/2023    HEMOGLOBIN 9.1 (L) 09/26/2023    HEMATOCRIT 29.2 (L) 09/26/2023    PLATELETCT 119 (L) 09/26/2023        Total time of the discharge process exceeds 45 minutes.

## 2023-09-27 NOTE — DISCHARGE PLANNING
Case Management Discharge Planning    Admission Date: 9/19/2023  GMLOS: 5  ALOS: 8    6-Clicks ADL Score: 11  6-Clicks Mobility Score: 6        Anticipated Discharge Dispo: Discharge Disposition: D/T to SNF with Medicare cert in anticipation of skilled care (03)    Action(s) Taken:   Insurance authorized SNF.    Transport and [facility] forms faxed to [facility].  Transport confirmed for 1200 today.  RN CM spoke to patient's son, Bro via telephone.  I informed him of insurance auth completed for SNF and transfer to [facility].  He was agreeable.  Verbal for [facility] obtained.  [facility] signed by Dr. Correa.  DC packet given to bedside RN, Good.    Medically Clear: Yes    Next Steps: DC    Barriers to Discharge: None

## 2023-10-17 ENCOUNTER — APPOINTMENT (OUTPATIENT)
Dept: RADIOLOGY | Facility: MEDICAL CENTER | Age: 88
DRG: 981 | End: 2023-10-17
Attending: EMERGENCY MEDICINE
Payer: MEDICARE

## 2023-10-17 ENCOUNTER — HOSPITAL ENCOUNTER (INPATIENT)
Facility: MEDICAL CENTER | Age: 88
LOS: 7 days | DRG: 981 | End: 2023-10-24
Attending: EMERGENCY MEDICINE | Admitting: FAMILY MEDICINE
Payer: MEDICARE

## 2023-10-17 DIAGNOSIS — N39.0 COMPLICATED UTI (URINARY TRACT INFECTION): ICD-10-CM

## 2023-10-17 DIAGNOSIS — L89.150 PRESSURE INJURY OF SACRAL REGION, UNSTAGEABLE (HCC): ICD-10-CM

## 2023-10-17 DIAGNOSIS — R53.81 PHYSICAL DEBILITY: ICD-10-CM

## 2023-10-17 DIAGNOSIS — R41.82 ALTERED MENTAL STATUS, UNSPECIFIED ALTERED MENTAL STATUS TYPE: ICD-10-CM

## 2023-10-17 DIAGNOSIS — L89.154 SACRAL DECUBITUS ULCER, STAGE IV (HCC): ICD-10-CM

## 2023-10-17 DIAGNOSIS — F03.90 DEMENTIA WITHOUT BEHAVIORAL DISTURBANCE (HCC): ICD-10-CM

## 2023-10-17 PROBLEM — L89.159 SACRAL DECUBITUS ULCER: Status: ACTIVE | Noted: 2023-10-17

## 2023-10-17 LAB
ALBUMIN SERPL BCP-MCNC: 2 G/DL (ref 3.2–4.9)
ALBUMIN/GLOB SERPL: 0.6 G/DL
ALP SERPL-CCNC: 173 U/L (ref 30–99)
ALT SERPL-CCNC: 20 U/L (ref 2–50)
ANION GAP SERPL CALC-SCNC: 11 MMOL/L (ref 7–16)
ANISOCYTOSIS BLD QL SMEAR: ABNORMAL
APPEARANCE UR: ABNORMAL
AST SERPL-CCNC: 23 U/L (ref 12–45)
BACTERIA #/AREA URNS HPF: ABNORMAL /HPF
BASOPHILS # BLD AUTO: 0 % (ref 0–1.8)
BASOPHILS # BLD: 0 K/UL (ref 0–0.12)
BILIRUB SERPL-MCNC: 0.7 MG/DL (ref 0.1–1.5)
BILIRUB UR QL STRIP.AUTO: ABNORMAL
BUN SERPL-MCNC: 25 MG/DL (ref 8–22)
BURR CELLS BLD QL SMEAR: NORMAL
CALCIUM ALBUM COR SERPL-MCNC: 9.9 MG/DL (ref 8.5–10.5)
CALCIUM SERPL-MCNC: 8.3 MG/DL (ref 8.5–10.5)
CHLORIDE SERPL-SCNC: 106 MMOL/L (ref 96–112)
CO2 SERPL-SCNC: 22 MMOL/L (ref 20–33)
COLOR UR: ABNORMAL
COMMENT NL1176: NORMAL
CREAT SERPL-MCNC: 1.08 MG/DL (ref 0.5–1.4)
EKG IMPRESSION: NORMAL
EOSINOPHIL # BLD AUTO: 0.06 K/UL (ref 0–0.51)
EOSINOPHIL NFR BLD: 0.8 % (ref 0–6.9)
EPI CELLS #/AREA URNS HPF: ABNORMAL /HPF
ERYTHROCYTE [DISTWIDTH] IN BLOOD BY AUTOMATED COUNT: 63.5 FL (ref 35.9–50)
ERYTHROCYTE [SEDIMENTATION RATE] IN BLOOD BY WESTERGREN METHOD: 59 MM/HOUR (ref 0–20)
GFR SERPLBLD CREATININE-BSD FMLA CKD-EPI: 66 ML/MIN/1.73 M 2
GLOBULIN SER CALC-MCNC: 3.2 G/DL (ref 1.9–3.5)
GLUCOSE SERPL-MCNC: 89 MG/DL (ref 65–99)
GLUCOSE UR STRIP.AUTO-MCNC: NEGATIVE MG/DL
HCT VFR BLD AUTO: 31.4 % (ref 42–52)
HGB BLD-MCNC: 10.3 G/DL (ref 14–18)
HYALINE CASTS #/AREA URNS LPF: ABNORMAL /LPF
KETONES UR STRIP.AUTO-MCNC: ABNORMAL MG/DL
LACTATE SERPL-SCNC: 2.5 MMOL/L (ref 0.5–2)
LACTATE SERPL-SCNC: 2.7 MMOL/L (ref 0.5–2)
LEUKOCYTE ESTERASE UR QL STRIP.AUTO: ABNORMAL
LYMPHOCYTES # BLD AUTO: 1.85 K/UL (ref 1–4.8)
LYMPHOCYTES NFR BLD: 24 % (ref 22–41)
MACROCYTES BLD QL SMEAR: ABNORMAL
MANUAL DIFF BLD: NORMAL
MCH RBC QN AUTO: 29.7 PG (ref 27–33)
MCHC RBC AUTO-ENTMCNC: 32.8 G/DL (ref 32.3–36.5)
MCV RBC AUTO: 90.5 FL (ref 81.4–97.8)
MICRO URNS: ABNORMAL
MONOCYTES # BLD AUTO: 0.51 K/UL (ref 0–0.85)
MONOCYTES NFR BLD AUTO: 6.6 % (ref 0–13.4)
MORPHOLOGY BLD-IMP: NORMAL
MYELOCYTES NFR BLD MANUAL: 0.8 %
NEUTROPHILS # BLD AUTO: 5.22 K/UL (ref 1.82–7.42)
NEUTROPHILS NFR BLD: 66.1 % (ref 44–72)
NEUTS BAND NFR BLD MANUAL: 1.7 % (ref 0–10)
NITRITE UR QL STRIP.AUTO: POSITIVE
NRBC # BLD AUTO: 0 K/UL
NRBC BLD-RTO: 0 /100 WBC (ref 0–0.2)
NT-PROBNP SERPL IA-MCNC: 918 PG/ML (ref 0–125)
PH UR STRIP.AUTO: 6 [PH] (ref 5–8)
PLATELET # BLD AUTO: 88 K/UL (ref 164–446)
PLATELET BLD QL SMEAR: NORMAL
PLATELETS.RETICULATED NFR BLD AUTO: 4 % (ref 0.6–13.1)
PMV BLD AUTO: 11.5 FL (ref 9–12.9)
POIKILOCYTOSIS BLD QL SMEAR: NORMAL
POTASSIUM SERPL-SCNC: 3.8 MMOL/L (ref 3.6–5.5)
PROT SERPL-MCNC: 5.2 G/DL (ref 6–8.2)
PROT UR QL STRIP: 300 MG/DL
RBC # BLD AUTO: 3.47 M/UL (ref 4.7–6.1)
RBC # URNS HPF: ABNORMAL /HPF
RBC BLD AUTO: PRESENT
RBC UR QL AUTO: ABNORMAL
SMUDGE CELLS BLD QL SMEAR: NORMAL
SODIUM SERPL-SCNC: 139 MMOL/L (ref 135–145)
SP GR UR STRIP.AUTO: 1.02
TARGETS BLD QL SMEAR: NORMAL
UROBILINOGEN UR STRIP.AUTO-MCNC: 1 MG/DL
WBC # BLD AUTO: 7.7 K/UL (ref 4.8–10.8)
WBC #/AREA URNS HPF: ABNORMAL /HPF

## 2023-10-17 PROCEDURE — 87077 CULTURE AEROBIC IDENTIFY: CPT

## 2023-10-17 PROCEDURE — 99285 EMERGENCY DEPT VISIT HI MDM: CPT

## 2023-10-17 PROCEDURE — 87186 SC STD MICRODIL/AGAR DIL: CPT

## 2023-10-17 PROCEDURE — 85007 BL SMEAR W/DIFF WBC COUNT: CPT

## 2023-10-17 PROCEDURE — 93005 ELECTROCARDIOGRAM TRACING: CPT | Performed by: EMERGENCY MEDICINE

## 2023-10-17 PROCEDURE — 71045 X-RAY EXAM CHEST 1 VIEW: CPT

## 2023-10-17 PROCEDURE — 83605 ASSAY OF LACTIC ACID: CPT

## 2023-10-17 PROCEDURE — 85055 RETICULATED PLATELET ASSAY: CPT

## 2023-10-17 PROCEDURE — 85652 RBC SED RATE AUTOMATED: CPT

## 2023-10-17 PROCEDURE — 80053 COMPREHEN METABOLIC PANEL: CPT

## 2023-10-17 PROCEDURE — 36415 COLL VENOUS BLD VENIPUNCTURE: CPT

## 2023-10-17 PROCEDURE — 85027 COMPLETE CBC AUTOMATED: CPT

## 2023-10-17 PROCEDURE — 96365 THER/PROPH/DIAG IV INF INIT: CPT

## 2023-10-17 PROCEDURE — 87040 BLOOD CULTURE FOR BACTERIA: CPT

## 2023-10-17 PROCEDURE — 700105 HCHG RX REV CODE 258: Performed by: EMERGENCY MEDICINE

## 2023-10-17 PROCEDURE — 87086 URINE CULTURE/COLONY COUNT: CPT

## 2023-10-17 PROCEDURE — 700102 HCHG RX REV CODE 250 W/ 637 OVERRIDE(OP)

## 2023-10-17 PROCEDURE — A9270 NON-COVERED ITEM OR SERVICE: HCPCS

## 2023-10-17 PROCEDURE — 87106 FUNGI IDENTIFICATION YEAST: CPT

## 2023-10-17 PROCEDURE — 700105 HCHG RX REV CODE 258

## 2023-10-17 PROCEDURE — 83880 ASSAY OF NATRIURETIC PEPTIDE: CPT

## 2023-10-17 PROCEDURE — 700111 HCHG RX REV CODE 636 W/ 250 OVERRIDE (IP): Mod: JZ | Performed by: EMERGENCY MEDICINE

## 2023-10-17 PROCEDURE — 770020 HCHG ROOM/CARE - TELE (206)

## 2023-10-17 PROCEDURE — 81001 URINALYSIS AUTO W/SCOPE: CPT

## 2023-10-17 RX ORDER — TAMSULOSIN HYDROCHLORIDE 0.4 MG/1
0.4 CAPSULE ORAL EVERY EVENING
Status: DISCONTINUED | OUTPATIENT
Start: 2023-10-17 | End: 2023-10-24 | Stop reason: HOSPADM

## 2023-10-17 RX ORDER — MEMANTINE HYDROCHLORIDE 5 MG/1
5 TABLET ORAL DAILY
Status: DISCONTINUED | OUTPATIENT
Start: 2023-10-18 | End: 2023-10-24 | Stop reason: HOSPADM

## 2023-10-17 RX ORDER — OXYBUTYNIN CHLORIDE 5 MG/1
5 TABLET, EXTENDED RELEASE ORAL DAILY
Status: DISCONTINUED | OUTPATIENT
Start: 2023-10-18 | End: 2023-10-18

## 2023-10-17 RX ORDER — POLYETHYLENE GLYCOL 3350 17 G/17G
1 POWDER, FOR SOLUTION ORAL
Status: DISCONTINUED | OUTPATIENT
Start: 2023-10-17 | End: 2023-10-24

## 2023-10-17 RX ORDER — AMOXICILLIN 250 MG
2 CAPSULE ORAL 2 TIMES DAILY
Status: DISCONTINUED | OUTPATIENT
Start: 2023-10-17 | End: 2023-10-17

## 2023-10-17 RX ORDER — AMOXICILLIN 250 MG
1 CAPSULE ORAL 2 TIMES DAILY
Status: DISCONTINUED | OUTPATIENT
Start: 2023-10-17 | End: 2023-10-24 | Stop reason: HOSPADM

## 2023-10-17 RX ORDER — CALCIUM CARBONATE/VITAMIN D3 500MG-5MCG
1 TABLET ORAL DAILY
COMMUNITY

## 2023-10-17 RX ORDER — POTASSIUM CHLORIDE 20 MEQ/1
10 TABLET, EXTENDED RELEASE ORAL DAILY
Status: DISCONTINUED | OUTPATIENT
Start: 2023-10-18 | End: 2023-10-24 | Stop reason: HOSPADM

## 2023-10-17 RX ORDER — ACETAMINOPHEN 325 MG/1
650 TABLET ORAL EVERY 6 HOURS PRN
Status: DISCONTINUED | OUTPATIENT
Start: 2023-10-17 | End: 2023-10-24

## 2023-10-17 RX ORDER — UREA 10 %
3 LOTION (ML) TOPICAL
Status: DISCONTINUED | OUTPATIENT
Start: 2023-10-17 | End: 2023-10-24 | Stop reason: HOSPADM

## 2023-10-17 RX ORDER — AMINO ACIDS/PROTEIN HYDROLYS 15G-100/30
30 LIQUID (ML) ORAL DAILY
COMMUNITY

## 2023-10-17 RX ORDER — SODIUM CHLORIDE, SODIUM LACTATE, POTASSIUM CHLORIDE, CALCIUM CHLORIDE 600; 310; 30; 20 MG/100ML; MG/100ML; MG/100ML; MG/100ML
1000 INJECTION, SOLUTION INTRAVENOUS ONCE
Status: COMPLETED | OUTPATIENT
Start: 2023-10-17 | End: 2023-10-17

## 2023-10-17 RX ORDER — LABETALOL HYDROCHLORIDE 5 MG/ML
10 INJECTION, SOLUTION INTRAVENOUS EVERY 4 HOURS PRN
Status: DISCONTINUED | OUTPATIENT
Start: 2023-10-17 | End: 2023-10-24 | Stop reason: HOSPADM

## 2023-10-17 RX ORDER — OMEPRAZOLE 20 MG/1
20 CAPSULE, DELAYED RELEASE ORAL DAILY
Status: DISCONTINUED | OUTPATIENT
Start: 2023-10-18 | End: 2023-10-24 | Stop reason: HOSPADM

## 2023-10-17 RX ORDER — NYSTATIN 100000 [USP'U]/G
1 POWDER TOPICAL 2 TIMES DAILY
COMMUNITY

## 2023-10-17 RX ORDER — SODIUM CHLORIDE, SODIUM LACTATE, POTASSIUM CHLORIDE, CALCIUM CHLORIDE 600; 310; 30; 20 MG/100ML; MG/100ML; MG/100ML; MG/100ML
INJECTION, SOLUTION INTRAVENOUS CONTINUOUS
Status: DISCONTINUED | OUTPATIENT
Start: 2023-10-17 | End: 2023-10-22

## 2023-10-17 RX ORDER — BISACODYL 10 MG
10 SUPPOSITORY, RECTAL RECTAL
Status: DISCONTINUED | OUTPATIENT
Start: 2023-10-17 | End: 2023-10-24

## 2023-10-17 RX ADMIN — SODIUM CHLORIDE, POTASSIUM CHLORIDE, SODIUM LACTATE AND CALCIUM CHLORIDE: 600; 310; 30; 20 INJECTION, SOLUTION INTRAVENOUS at 20:45

## 2023-10-17 RX ADMIN — CEFEPIME 2 G: 2 INJECTION, POWDER, FOR SOLUTION INTRAVENOUS at 17:51

## 2023-10-17 RX ADMIN — SODIUM CHLORIDE, POTASSIUM CHLORIDE, SODIUM LACTATE AND CALCIUM CHLORIDE 1000 ML: 600; 310; 30; 20 INJECTION, SOLUTION INTRAVENOUS at 17:49

## 2023-10-17 RX ADMIN — TAMSULOSIN HYDROCHLORIDE 0.4 MG: 0.4 CAPSULE ORAL at 20:45

## 2023-10-17 RX ADMIN — SENNOSIDES AND DOCUSATE SODIUM 1 TABLET: 50; 8.6 TABLET ORAL at 20:45

## 2023-10-17 RX ADMIN — Medication 3 MG: at 20:45

## 2023-10-17 RX ADMIN — RIVAROXABAN 10 MG: 10 TABLET, FILM COATED ORAL at 20:45

## 2023-10-17 ASSESSMENT — PAIN DESCRIPTION - PAIN TYPE: TYPE: ACUTE PAIN

## 2023-10-17 ASSESSMENT — FIBROSIS 4 INDEX: FIB4 SCORE: 5.45

## 2023-10-17 NOTE — ED NOTES
Graham was removed that was in place when patient arrived. New graham placed an urine collected off of new graham. ERP and RN at bedside to assess Pressure ulcer on sacrum. Clinical picture taken

## 2023-10-17 NOTE — ED TRIAGE NOTES
Chief Complaint   Patient presents with    ALOC    Wound Infection     BIBA for above complaint. PER ems patient came from [facility]. They stated he was more altered today when speech came to see him. Patient has a large stage 4 ulcer on coccyx. Unable to take a temperature orally, axillary, or rectally at this point. Will update ERP. Patient is A+O to self only, baseline is A+Ox2-3 per skilled nursing home.   Patient given 500 cc of fluids by EMS Granger in place.

## 2023-10-17 NOTE — ED NOTES
Med rec completed per MAR sent with patient from [facility].  Allergies reviewed per transfer paperwork.    Patient is on a course of minocycline started on 9/27/2023. No end date specified on MAR.    ANTICOAGULATION: Patient is on XARELTO. Last dose 10/16/2023 at 18:00.

## 2023-10-17 NOTE — ED PROVIDER NOTES
ED Provider Note    CHIEF COMPLAINT  Chief Complaint   Patient presents with    ALOC    Wound Infection       EXTERNAL RECORDS REVIEWED  Reviewed recent admission and discharge summary from 9/27, urine culture    HPI/ROS  LIMITATION TO HISTORY   Patient is altered  OUTSIDE HISTORIAN(S):  EMS provided additional history    Trev Carl is a 88 y.o. male who presents from [facility] for apparent altered mental status.  The patient has a complex medical history and recent hospitalization.  Please see electronic medical record.  Briefly the patient is an 88-year-old debilitated woman was recently hospitalized for Klebsiella pneumonia and ESBL bacteremia as well as urosepsis.  He has ureteral stents in place.  He was briefly in the ICU requiring pressors treated with meropenem and currently had improvement.  The patient was discharged back to [facility] nursing around 3 weeks ago.  Apparently had become increasingly altered and has a worsening decubitus ulcer that has developed in the interim timeframe.    PAST MEDICAL HISTORY   has a past medical history of Hearing loss.    SURGICAL HISTORY  patient denies any surgical history    FAMILY HISTORY  No family history on file.    SOCIAL HISTORY  Social History     Tobacco Use    Smoking status: Never    Smokeless tobacco: Never   Vaping Use    Vaping Use: Every day   Substance and Sexual Activity    Alcohol use: Not on file    Drug use: Not on file    Sexual activity: Not on file       CURRENT MEDICATIONS  Home Medications       Reviewed by Maureen Nunez R.N. (Registered Nurse) on 10/17/23 at 1351  Med List Status: <None>     Medication Last Dose Status   apixaban (ELIQUIS) 5mg Tab  Active   guaiFENesin (ROBITUSSIN) 100 MG/5ML liquid  Active   melatonin 3 MG Tab  Active   memantine (NAMENDA) 5 MG Tab  Active   minocycline (MINOCIN) 100 MG Cap  Active   nystatin (MYCOSTATIN) 277405 UNIT/ML Suspension  Active   omeprazole (PRILOSEC) 20 MG delayed-release capsule   "Active   oxybutynin SR (DITROPAN-XL) 5 MG TABLET SR 24 HR  Active   potassium chloride SA (KDUR) 20 MEQ Tab CR  Active   senna-docusate (PERICOLACE OR SENOKOT S) 8.6-50 MG Tab  Active   tamsulosin (FLOMAX) 0.4 MG capsule  Active   terazosin (HYTRIN) 5 MG Cap  Active                    ALLERGIES  No Known Allergies    PHYSICAL EXAM  VITAL SIGNS: /66   Pulse 71   Resp 18   Ht 1.778 m (5' 10\")   Wt 91.2 kg (201 lb)   SpO2 95%   BMI 28.84 kg/m²    Pulse ox interpretation: I interpret this pulse ox as normal.  Constitutional: Alert and oriented x 3, patient appears chronically ill  HEENT: Atraumatic normocephalic, pupils are equal round reactive to light extraocular movements are intact. The nares is clear, external ears are normal, mouth shows moist mucous membranes normal dentition for age  Neck: Supple, no JVD no tracheal deviation  Cardiovascular: Regular rate and rhythm no murmur rub or gallop 2+ pulses peripherally x4  Thorax & Lungs: No respiratory distress, no wheezes rales or rhonchi, No chest tenderness.   GI: Soft nontender nondistended positive bowel sounds, no peritoneal signs  Skin: Stage IV right-sided sacral decubitus ulcer contaminated with fecal matter as pictured below      Musculoskeletal: Moving all extremities with full range and 5 of 5 strength no acute  deformity  Neurologic: Cranial nerves III through XII are grossly intact no sensory deficit no cerebellar dysfunction   Psychiatric: Appropriate affect for situation at this time          DIAGNOSTIC STUDIES / PROCEDURES    LABS  Results for orders placed or performed during the hospital encounter of 10/17/23   CBC With Differential   Result Value Ref Range    WBC 7.7 4.8 - 10.8 K/uL    RBC 3.47 (L) 4.70 - 6.10 M/uL    Hemoglobin 10.3 (L) 14.0 - 18.0 g/dL    Hematocrit 31.4 (L) 42.0 - 52.0 %    MCV 90.5 81.4 - 97.8 fL    MCH 29.7 27.0 - 33.0 pg    MCHC 32.8 32.3 - 36.5 g/dL    RDW 63.5 (H) 35.9 - 50.0 fL    Platelet Count 88 (L) 164 - " 446 K/uL    MPV 11.5 9.0 - 12.9 fL    Neutrophils-Polys 66.10 44.00 - 72.00 %    Lymphocytes 24.00 22.00 - 41.00 %    Monocytes 6.60 0.00 - 13.40 %    Eosinophils 0.80 0.00 - 6.90 %    Basophils 0.00 0.00 - 1.80 %    Nucleated RBC 0.00 0.00 - 0.20 /100 WBC    Neutrophils (Absolute) 5.22 1.82 - 7.42 K/uL    Lymphs (Absolute) 1.85 1.00 - 4.80 K/uL    Monos (Absolute) 0.51 0.00 - 0.85 K/uL    Eos (Absolute) 0.06 0.00 - 0.51 K/uL    Baso (Absolute) 0.00 0.00 - 0.12 K/uL    NRBC (Absolute) 0.00 K/uL    Anisocytosis 1+     Macrocytosis 1+    Comp Metabolic Panel   Result Value Ref Range    Sodium 139 135 - 145 mmol/L    Potassium 3.8 3.6 - 5.5 mmol/L    Chloride 106 96 - 112 mmol/L    Co2 22 20 - 33 mmol/L    Anion Gap 11.0 7.0 - 16.0    Glucose 89 65 - 99 mg/dL    Bun 25 (H) 8 - 22 mg/dL    Creatinine 1.08 0.50 - 1.40 mg/dL    Calcium 8.3 (L) 8.5 - 10.5 mg/dL    Correct Calcium 9.9 8.5 - 10.5 mg/dL    AST(SGOT) 23 12 - 45 U/L    ALT(SGPT) 20 2 - 50 U/L    Alkaline Phosphatase 173 (H) 30 - 99 U/L    Total Bilirubin 0.7 0.1 - 1.5 mg/dL    Albumin 2.0 (L) 3.2 - 4.9 g/dL    Total Protein 5.2 (L) 6.0 - 8.2 g/dL    Globulin 3.2 1.9 - 3.5 g/dL    A-G Ratio 0.6 g/dL   Lactic Acid   Result Value Ref Range    Lactic Acid 2.5 (H) 0.5 - 2.0 mmol/L   Lactic Acid   Result Value Ref Range    Lactic Acid 2.7 (H) 0.5 - 2.0 mmol/L   proBrain Natriuretic Peptide, NT   Result Value Ref Range    NT-proBNP 918 (H) 0 - 125 pg/mL   Sed Rate   Result Value Ref Range    Sed Rate Westergren 59 (H) 0 - 20 mm/hour   URINALYSIS    Specimen: Urine   Result Value Ref Range    Color DK Yellow     Character Turbid (A)     Specific Gravity 1.019 <1.035    Ph 6.0 5.0 - 8.0    Glucose Negative Negative mg/dL    Ketones Trace (A) Negative mg/dL    Protein 300 (A) Negative mg/dL    Bilirubin Small (A) Negative    Urobilinogen, Urine 1.0 Negative    Nitrite Positive (A) Negative    Leukocyte Esterase Large (A) Negative    Occult Blood Large (A) Negative     Micro Urine Req Microscopic    ESTIMATED GFR   Result Value Ref Range    GFR (CKD-EPI) 66 >60 mL/min/1.73 m 2   PLATELET ESTIMATE   Result Value Ref Range    Plt Estimation Decreased    MORPHOLOGY   Result Value Ref Range    RBC Morphology Present     Poikilocytosis 2+     Target Cells 1+     Echinocytes 2+     Smudge Cells Many    PERIPHERAL SMEAR REVIEW   Result Value Ref Range    Peripheral Smear Review see below    IMMATURE PLT FRACTION   Result Value Ref Range    Imm. Plt Fraction 4.0 0.6 - 13.1 %   DIFFERENTIAL MANUAL   Result Value Ref Range    Bands-Stabs 1.70 0.00 - 10.00 %    Myelocytes 0.80 %    Manual Diff Status PERFORMED     Comment See Comment    URINE MICROSCOPIC (W/UA)   Result Value Ref Range    WBC Packed (A) /hpf    RBC 20-50 (A) /hpf    Bacteria Moderate (A) None /hpf    Epithelial Cells Few /hpf    Hyaline Cast 0-2 /lpf   EKG   Result Value Ref Range    Report       [facility] Emergency Dept.    Test Date:  2023-10-17  Pt Name:    TOOTIE DOWNING                   Department: ER  MRN:        [id number]                      Room:       [facility]  Gender:     Male                         Technician: [id number]  :        1935                   Requested By:JESUS MANUEL NOLAN  Order #:    [id number]                    Reading MD:    Measurements  Intervals                                Axis  Rate:       99                           P:          0  PA:         0                            QRS:        -35  QRSD:       91                           T:          44  QT:         376  QTc:        483    Interpretive Statements  Atrial fibrillation  Low voltage, extremity and precordial leads  Borderline prolonged QT interval  Compared to ECG 2023 08:32:19  Ventricular premature complex(es) no longer present        EKG interpretation by me rhythm atrial fibrillation rate 99 low voltage no acute ST segment elevation or depression no pathological T wave inversions  RADIOLOGY  DX-CHEST-PORTABLE (1  VIEW)   Final Result      No radiographic evidence of acute cardiopulmonary disease.        i my interpretation no acute heart failure or pneumonia    COURSE & MEDICAL DECISION MAKING    ED Observation Status? No; Patient does not meet criteria for ED Observation.     INITIAL ASSESSMENT, COURSE AND PLAN  Care Narrative:     This is a very pleasant 88-year-old gentleman who presents here with lethargy, worsening stage IV sacral decubitus ulcer contaminated with fecal matter and recurrent UTI.  The urine in the Granger catheter appeared to be quite cloudy.  This was removed.  Nursing staff was ordered and placed a new Granger catheter and send a new urine sample which appears to be grossly infected.  Consultation with the ER pharmacist was obtained they recommended empiric coverage with cefepime to start.  I provided pictures of the sacral decubitus wounds as well.  The patient will be admitted to the hospitalist service with infectious disease following along.    HYDRATION: Based on the patient's presentation of Hypotension the patient was given IV fluids. IV Hydration was used because oral hydration was not adequate alone. Upon recheck following hydration, the patient was improved.      ADDITIONAL PROBLEM LIST    DISPOSITION AND DISCUSSIONS  I have discussed management of the patient with the following physicians and NICOLE's: Discussed with admitting hospitalist    Discussion of management with other QHP or appropriate source(s): Discussed with ER pharmacist    Escalation of care considered, and ultimately not performed: Considered ICU    Barriers to care at this time, including but not limited to: None none    Decision tools and prescription drugs considered including, but not limited to: none    FINAL DIAGNOSIS  1. Complicated UTI (urinary tract infection)        2. Sacral decubitus ulcer, stage IV ([facility])                 Electronically signed by: Orlando Oneill M.D., 10/17/2023 2:00 PM

## 2023-10-18 LAB
ALBUMIN SERPL BCP-MCNC: 1.4 G/DL (ref 3.2–4.9)
ALBUMIN/GLOB SERPL: ABNORMAL G/DL
ALP SERPL-CCNC: 135 U/L (ref 30–99)
ALT SERPL-CCNC: 16 U/L (ref 2–50)
ANION GAP SERPL CALC-SCNC: 11 MMOL/L (ref 7–16)
AST SERPL-CCNC: 28 U/L (ref 12–45)
BASOPHILS # BLD AUTO: 0.3 % (ref 0–1.8)
BASOPHILS # BLD: 0.03 K/UL (ref 0–0.12)
BILIRUB SERPL-MCNC: 0.5 MG/DL (ref 0.1–1.5)
BUN SERPL-MCNC: 24 MG/DL (ref 8–22)
CALCIUM ALBUM COR SERPL-MCNC: 9.8 MG/DL (ref 8.5–10.5)
CALCIUM SERPL-MCNC: 7.7 MG/DL (ref 8.5–10.5)
CHLORIDE SERPL-SCNC: 109 MMOL/L (ref 96–112)
CO2 SERPL-SCNC: 19 MMOL/L (ref 20–33)
CREAT SERPL-MCNC: 1.13 MG/DL (ref 0.5–1.4)
EOSINOPHIL # BLD AUTO: 0.11 K/UL (ref 0–0.51)
EOSINOPHIL NFR BLD: 1.2 % (ref 0–6.9)
ERYTHROCYTE [DISTWIDTH] IN BLOOD BY AUTOMATED COUNT: 63.4 FL (ref 35.9–50)
GFR SERPLBLD CREATININE-BSD FMLA CKD-EPI: 62 ML/MIN/1.73 M 2
GLOBULIN SER CALC-MCNC: ABNORMAL G/DL (ref 1.9–3.5)
GLUCOSE SERPL-MCNC: 72 MG/DL (ref 65–99)
HCT VFR BLD AUTO: 25.3 % (ref 42–52)
HGB BLD-MCNC: 8.3 G/DL (ref 14–18)
IMM GRANULOCYTES # BLD AUTO: 0.13 K/UL (ref 0–0.11)
IMM GRANULOCYTES NFR BLD AUTO: 1.4 % (ref 0–0.9)
IRON SATN MFR SERPL: 30 % (ref 15–55)
IRON SERPL-MCNC: 23 UG/DL (ref 50–180)
LACTATE SERPL-SCNC: 1.2 MMOL/L (ref 0.5–2)
LACTATE SERPL-SCNC: 1.3 MMOL/L (ref 0.5–2)
LYMPHOCYTES # BLD AUTO: 2.22 K/UL (ref 1–4.8)
LYMPHOCYTES NFR BLD: 23.7 % (ref 22–41)
MAGNESIUM SERPL-MCNC: 1.6 MG/DL (ref 1.5–2.5)
MCH RBC QN AUTO: 29.7 PG (ref 27–33)
MCHC RBC AUTO-ENTMCNC: 32.8 G/DL (ref 32.3–36.5)
MCV RBC AUTO: 90.7 FL (ref 81.4–97.8)
MONOCYTES # BLD AUTO: 0.54 K/UL (ref 0–0.85)
MONOCYTES NFR BLD AUTO: 5.8 % (ref 0–13.4)
NEUTROPHILS # BLD AUTO: 6.35 K/UL (ref 1.82–7.42)
NEUTROPHILS NFR BLD: 67.6 % (ref 44–72)
NRBC # BLD AUTO: 0 K/UL
NRBC BLD-RTO: 0 /100 WBC (ref 0–0.2)
PLATELET # BLD AUTO: 86 K/UL (ref 164–446)
PLATELETS.RETICULATED NFR BLD AUTO: 3.1 % (ref 0.6–13.1)
PMV BLD AUTO: 11.6 FL (ref 9–12.9)
POTASSIUM SERPL-SCNC: 3.6 MMOL/L (ref 3.6–5.5)
PROT SERPL-MCNC: 4.1 G/DL (ref 6–8.2)
RBC # BLD AUTO: 2.79 M/UL (ref 4.7–6.1)
SODIUM SERPL-SCNC: 139 MMOL/L (ref 135–145)
TIBC SERPL-MCNC: 77 UG/DL (ref 250–450)
UIBC SERPL-MCNC: 54 UG/DL (ref 110–370)
WBC # BLD AUTO: 9.4 K/UL (ref 4.8–10.8)

## 2023-10-18 PROCEDURE — A9270 NON-COVERED ITEM OR SERVICE: HCPCS

## 2023-10-18 PROCEDURE — A9270 NON-COVERED ITEM OR SERVICE: HCPCS | Performed by: BEHAVIOR ANALYST

## 2023-10-18 PROCEDURE — 99222 1ST HOSP IP/OBS MODERATE 55: CPT | Mod: AI,GC | Performed by: FAMILY MEDICINE

## 2023-10-18 PROCEDURE — 700102 HCHG RX REV CODE 250 W/ 637 OVERRIDE(OP)

## 2023-10-18 PROCEDURE — 700111 HCHG RX REV CODE 636 W/ 250 OVERRIDE (IP): Mod: JZ

## 2023-10-18 PROCEDURE — 83605 ASSAY OF LACTIC ACID: CPT

## 2023-10-18 PROCEDURE — 700101 HCHG RX REV CODE 250

## 2023-10-18 PROCEDURE — 85025 COMPLETE CBC W/AUTO DIFF WBC: CPT

## 2023-10-18 PROCEDURE — 700105 HCHG RX REV CODE 258

## 2023-10-18 PROCEDURE — 700102 HCHG RX REV CODE 250 W/ 637 OVERRIDE(OP): Performed by: FAMILY MEDICINE

## 2023-10-18 PROCEDURE — 85055 RETICULATED PLATELET ASSAY: CPT

## 2023-10-18 PROCEDURE — 83735 ASSAY OF MAGNESIUM: CPT

## 2023-10-18 PROCEDURE — A9270 NON-COVERED ITEM OR SERVICE: HCPCS | Performed by: FAMILY MEDICINE

## 2023-10-18 PROCEDURE — 83540 ASSAY OF IRON: CPT

## 2023-10-18 PROCEDURE — 770020 HCHG ROOM/CARE - TELE (206)

## 2023-10-18 PROCEDURE — 97597 DBRDMT OPN WND 1ST 20 CM/<: CPT

## 2023-10-18 PROCEDURE — 80053 COMPREHEN METABOLIC PANEL: CPT

## 2023-10-18 PROCEDURE — 36415 COLL VENOUS BLD VENIPUNCTURE: CPT

## 2023-10-18 PROCEDURE — 700102 HCHG RX REV CODE 250 W/ 637 OVERRIDE(OP): Performed by: BEHAVIOR ANALYST

## 2023-10-18 PROCEDURE — 99222 1ST HOSP IP/OBS MODERATE 55: CPT | Mod: 25 | Performed by: SURGERY

## 2023-10-18 PROCEDURE — 83550 IRON BINDING TEST: CPT

## 2023-10-18 RX ORDER — SODIUM HYPOCHLORITE 1.25 MG/ML
SOLUTION TOPICAL 2 TIMES DAILY
Status: DISCONTINUED | OUTPATIENT
Start: 2023-10-18 | End: 2023-10-20

## 2023-10-18 RX ORDER — MINOCYCLINE HYDROCHLORIDE 50 MG/1
100 CAPSULE ORAL EVERY 12 HOURS
Status: DISCONTINUED | OUTPATIENT
Start: 2023-10-18 | End: 2023-10-24 | Stop reason: HOSPADM

## 2023-10-18 RX ADMIN — TAMSULOSIN HYDROCHLORIDE 0.4 MG: 0.4 CAPSULE ORAL at 18:01

## 2023-10-18 RX ADMIN — RIVAROXABAN 10 MG: 10 TABLET, FILM COATED ORAL at 18:01

## 2023-10-18 RX ADMIN — SENNOSIDES AND DOCUSATE SODIUM 1 TABLET: 50; 8.6 TABLET ORAL at 12:15

## 2023-10-18 RX ADMIN — CEFEPIME 2 G: 2 INJECTION, POWDER, FOR SOLUTION INTRAVENOUS at 05:28

## 2023-10-18 RX ADMIN — MICONAZOLE NITRATE: 20 CREAM TOPICAL at 10:24

## 2023-10-18 RX ADMIN — MEMANTINE HYDROCHLORIDE 5 MG: 10 TABLET ORAL at 05:30

## 2023-10-18 RX ADMIN — ACETAMINOPHEN 650 MG: 325 TABLET, FILM COATED ORAL at 12:15

## 2023-10-18 RX ADMIN — SENNOSIDES AND DOCUSATE SODIUM 1 TABLET: 50; 8.6 TABLET ORAL at 18:01

## 2023-10-18 RX ADMIN — MINOCYCLINE HYDROCHLORIDE 100 MG: 50 CAPSULE ORAL at 21:04

## 2023-10-18 RX ADMIN — SODIUM HYPOCHLORITE 30 ML: 1.25 SOLUTION TOPICAL at 10:23

## 2023-10-18 RX ADMIN — OMEPRAZOLE 20 MG: 20 CAPSULE, DELAYED RELEASE ORAL at 05:29

## 2023-10-18 RX ADMIN — SODIUM CHLORIDE, POTASSIUM CHLORIDE, SODIUM LACTATE AND CALCIUM CHLORIDE: 600; 310; 30; 20 INJECTION, SOLUTION INTRAVENOUS at 06:40

## 2023-10-18 RX ADMIN — OXYBUTYNIN CHLORIDE 5 MG: 5 TABLET, EXTENDED RELEASE ORAL at 05:29

## 2023-10-18 RX ADMIN — POTASSIUM CHLORIDE 10 MEQ: 1500 TABLET, EXTENDED RELEASE ORAL at 05:30

## 2023-10-18 ASSESSMENT — COGNITIVE AND FUNCTIONAL STATUS - GENERAL
MOVING FROM LYING ON BACK TO SITTING ON SIDE OF FLAT BED: A LOT
MOBILITY SCORE: 10
HELP NEEDED FOR BATHING: TOTAL
TOILETING: A LOT
SUGGESTED CMS G CODE MODIFIER DAILY ACTIVITY: CL
STANDING UP FROM CHAIR USING ARMS: TOTAL
DAILY ACTIVITIY SCORE: 13
SUGGESTED CMS G CODE MODIFIER MOBILITY: CL
TURNING FROM BACK TO SIDE WHILE IN FLAT BAD: A LITTLE
CLIMB 3 TO 5 STEPS WITH RAILING: TOTAL
EATING MEALS: A LITTLE
WALKING IN HOSPITAL ROOM: TOTAL
DRESSING REGULAR LOWER BODY CLOTHING: A LOT
MOVING TO AND FROM BED TO CHAIR: A LOT
PERSONAL GROOMING: A LITTLE
DRESSING REGULAR UPPER BODY CLOTHING: A LOT

## 2023-10-18 ASSESSMENT — FIBROSIS 4 INDEX: FIB4 SCORE: 7.16

## 2023-10-18 NOTE — CARE PLAN
The patient is Stable - Low risk of patient condition declining or worsening    Shift Goals  Clinical Goals: monitor LOC, abx, labs  Patient Goals: MARIVEL  Family Goals: MARIVEL    Progress made toward(s) clinical / shift goals:    Problem: Knowledge Deficit - Standard  Goal: Patient and family/care givers will demonstrate understanding of plan of care, disease process/condition, diagnostic tests and medications  Description: Target End Date:  1-3 days or as soon as patient condition allows    Document in Patient Education    1.  Patient and family/caregiver oriented to unit, equipment, visitation policy and means for communicating concern  2.  Complete/review Learning Assessment  3.  Assess knowledge level of disease process/condition, treatment plan, diagnostic tests and medications  4.  Explain disease process/condition, treatment plan, diagnostic tests and medications  Outcome: Progressing  Note: Pt updated on POC, reinforcement needed as patient has memory loss.     Problem: Fluid Volume  Goal: Fluid volume balance will be maintained  Description: Target End Date:  Prior to discharge or change in level of care    Document on I/O flowsheet    1.  Monitor intake and output as ordered  2.  Promote oral intake as appropriate  3.  Report inadequate intake or output to physician  4.  Administer IV therapy as ordered  5.  Weights per provider order  6.  Assess for signs and symptoms of bleeding  7.  Monitor for signs of fluid overload (respiratory changes, edema, weight gain, increased abdominal girth)  8.  Monitor of signs for inadequate fluid volume (poor skin turgor, dry mucous membranes)  9.  Instruct patient on adherence to fluid restrictions  Outcome: Progressing  Note: Pt has +2 edema in BLLE. Monitoring Is and Os consistently. Granger catheter in place.

## 2023-10-18 NOTE — PROGRESS NOTES
[facility] PROGRESS NOTE     Attending:   Sixto Quinteros M.d.    Resident:   Lucy Conte MD    PATIENT:   Trev Carl; [id number]; 1935    ID:   88 y.o. male admitted UTI, sacral wound.    SUBJECTIVE:   No acute events overnight, patient A/O x3-4; OR today for sacral wound debridement.    OBJECTIVE:  Vitals:    10/19/23 1245 10/19/23 1259 10/19/23 1327 10/19/23 1343   BP: 104/56 97/57 91/59 102/58   Pulse: 82 85 82 77   Resp: 14 18 18 18   Temp: 36.2 °C (97.2 °F)      TempSrc:  Temporal Temporal Temporal   SpO2: 99% 96% 95% 98%   Weight:       Height:           Intake/Output Summary (Last 24 hours) at 10/18/2023 1626  Last data filed at 10/18/2023 1200  Gross per 24 hour   Intake 97.28 ml   Output 400 ml   Net -302.72 ml       PHYSICAL EXAM:  General: No acute distress, afebrile, resting comfortably, conversational   HEENT: NC/AT. EOMI.   Cardiovascular: regularly irregular without murmurs, rubs, heaves. Normal capillary refill   Respiratory: CTAB, no tachypnea or retractions   Abdomen: normal bowel sounds, soft, nontender, nondistended, no masses, no organomegaly   EXT: Patient has a very large and deep chronic sacral wound, no drainage or blodd or signs of infection  Skin: No erythema/lesions   Neuro: Non-focal, alert and orientated     LABS:  Recent Labs     10/17/23  1413 10/18/23  0147 10/19/23  0212   WBC 7.7 9.4 5.6   RBC 3.47* 2.79* 2.86*   HEMOGLOBIN 10.3* 8.3* 8.4*   HEMATOCRIT 31.4* 25.3* 25.7*   MCV 90.5 90.7 89.9   MCH 29.7 29.7 29.4   RDW 63.5* 63.4* 63.3*   PLATELETCT 88* 86* 67*   MPV 11.5 11.6 10.7   NEUTSPOLYS 66.10 67.60 56.70   LYMPHOCYTES 24.00 23.70 29.40   MONOCYTES 6.60 5.80 7.50   EOSINOPHILS 0.80 1.20 4.80   BASOPHILS 0.00 0.30 0.70   RBCMORPHOLO Present  --   --      Recent Labs     10/17/23  1413 10/18/23  0147 10/19/23  0212   SODIUM 139 139 141   POTASSIUM 3.8 3.6 3.5*   CHLORIDE 106 109 111   CO2 22 19* 22   BUN 25* 24* 22   CREATININE 1.08 1.13 1.10   CALCIUM 8.3*  "7.7* 7.9*   MAGNESIUM  --  1.6  --    ALBUMIN 2.0* 1.4* 1.4*     Estimated GFR/CRCL = Estimated Creatinine Clearance: 47.9 mL/min (by C-G formula based on SCr of 1.1 mg/dL).  Recent Labs     10/17/23  1413 10/18/23  0147 10/19/23  0212   GLUCOSE 89 72 84     Recent Labs     10/17/23  1413 10/18/23  0147 10/19/23  0212   ASTSGOT 23 28 24   ALTSGPT 20 16 17   TBILIRUBIN 0.7 0.5 0.3   ALKPHOSPHAT 173* 135* 140*   GLOBULIN 3.2 see below see below             No results for input(s): \"INR\", \"APTT\", \"FIBRINOGEN\" in the last 72 hours.    Invalid input(s): \"DIMER\"      IMAGING:  DX-CHEST-PORTABLE (1 VIEW)   Final Result      No radiographic evidence of acute cardiopulmonary disease.          MEDS:  Current Facility-Administered Medications   Medication Last Admin    dakins 0.125% (1/4 strength) topical soln 50 mL at 10/19/23 0547    miconazole (Micotin) 2 % cream Given at 10/19/23 0547    minocycline (Minocin) capsule 100 mg 100 mg at 10/19/23 0543    polyethylene glycol/lytes (Miralax) PACKET 1 Packet      And    magnesium hydroxide (Milk Of Magnesia) suspension 30 mL      And    bisacodyl (Dulcolax) suppository 10 mg      lactated ringers infusion Stopped at 10/19/23 1125    labetalol (Normodyne/Trandate) injection 10 mg      acetaminophen (Tylenol) tablet 650 mg 650 mg at 10/18/23 1215    melatonin tablet 3 mg 3 mg at 10/17/23 2045    memantine (Namenda) tablet 5 mg 5 mg at 10/19/23 0544    omeprazole (PriLOSEC) capsule 20 mg 20 mg at 10/19/23 0543    potassium chloride SA (Kdur) tablet 10 mEq 10 mEq at 10/19/23 0544    senna-docusate (Pericolace Or Senokot S) 8.6-50 MG per tablet 1 Tablet 1 Tablet at 10/18/23 1801    tamsulosin (Flomax) capsule 0.4 mg 0.4 mg at 10/18/23 180       ASSESSMENT/PLAN:  88-year-old male past medical history of chronic sacral wound and potential UTI given AMS.    * Complicated UTI (urinary tract infection)- (present on admission)  Assessment & Plan  Patient presents with altered mental status " and UA positive for UTI.  No leukocytosis, patient does not appear septic.  Lactic acid was elevated at 2.5, repeat was 2.7.  He does have indwelling Granger catheter and frequent UTIs.  Last month, patient had Klebsiella ESBL UTI and bacteremia.  Lactic stable, HD stable, no leukocytosis, Urine culture grew klebsiella similar to prior hospitalization.    -IV fluids  -spoke with id pharm and agree to d/c abx at this time, as uti unlikely at this time     Sacral decubitus ulcer  Assessment & Plan  Patient has history of sacral decubitus ulcer.  He resides in skilled nursing facility.  Wound large and deep, altho does not have infectious picture at this time  -agree w/ ID pharm discussion on d/c abx at this time and restarting if s/s of infection arise  -s/p OR for debridement, f/u reccs  -srx consulted    AMS (altered mental status)  Assessment & Plan  Patient has altered mental status, most likely due to UTI.  We will treat UTI with antibiotics and assess for improvement.    Atrial fibrillation (HCC)- (present on admission)  Assessment & Plan  Patient has history of A-fib, anticoagulated.  We will continue his home Xarelto.  -Continue home Xarelto  -Telemetry monitoring    Anemia- (present on admission)  Assessment & Plan  Normocytic.  Patient has chronic anemia, currently at his baseline at 10.  Likely anemia of chronic disease or combined picture.  -Iron/TIBC        Code Status: dnr/dni    Dispo: Maintain inpatient status for further medical evaluation

## 2023-10-18 NOTE — CONSULTS
DATE OF CONSULTATION:  10/18/2023     REFERRING PHYSICIAN:   Lucy Conte M.D.     CONSULTING PHYSICIAN:  Amor Medrano M.D.     REASON FOR CONSULTATION:  I have been asked by  to see the patient in surgical consultation for evaluation of sacral decubitus ulcer.    HISTORY OF PRESENT ILLNESS: The patient is a 88 year-old White elderly man who was admitted on 10/17 for altered mental status. He resides in a skilled nursing facility and has a chronic indwelling urinary catheter, he was noted to have a dirty UA at the time of admission so was started on antibiotics for presumed urinary tract infection. During his evaluation he was noted to have a large unstageable decubitus ulcer which was debrided by the wound care team at bedside today. They felt their bedside debridement was inadequate and recommended surgical consultation for operative debridement.    PAST MEDICAL HISTORY:  has a past medical history of Hearing loss.    PAST SURGICAL HISTORY: no pertinent surgical history.    ALLERGIES: No Known Allergies    CURRENT MEDICATIONS:    Home Medications       Reviewed by Abigail Jolley (Pharmacy Tech) on 10/17/23 at 1444  Med List Status: Complete     Medication Last Dose Status   Amino Acids-Protein Hydrolys (PRO-STAT) Liquid 10/17/2023 Active   calcium/vitamin D (OS-CHRISTY CALCIUM + D3) 500-5 MG-MCG Tab 10/17/2023 Active   melatonin 3 MG Tab 10/16/2023 Active   memantine (NAMENDA) 5 MG Tab 10/17/2023 Active   minocycline (MINOCIN) 100 MG Cap 10/17/2023 Active   nystatin (MYCOSTATIN) powder 10/14/2023 Active   omeprazole (PRILOSEC) 20 MG delayed-release capsule 10/17/2023 Active   oxybutynin SR (DITROPAN-XL) 5 MG TABLET SR 24 HR 10/17/2023 Active   potassium chloride SA (K-DUR) 10 MEQ Tab CR 10/17/2023 Active   rivaroxaban (XARELTO) 10 MG Tab tablet 10/16/2023 Active   senna-docusate (PERICOLACE OR SENOKOT S) 8.6-50 MG Tab 10/17/2023 Active   tamsulosin (FLOMAX) 0.4 MG capsule 10/16/2023 Active                     FAMILY HISTORY: non-contributory    SOCIAL HISTORY:  reports that he has never smoked. He has never used smokeless tobacco.    REVIEW OF SYSTEMS: Comprehensive review of systems is negative with the exception of the aforementioned HPI, PMH, and PSH bullets in accordance with CMS guidelines.    PHYSICAL EXAMINATION:    Physical Exam  Vitals and nursing note reviewed.   Constitutional:       General: He is not in acute distress.     Appearance: He is not toxic-appearing.   HENT:      Head: Normocephalic and atraumatic.      Right Ear: External ear normal.      Left Ear: External ear normal.      Mouth/Throat:      Mouth: Mucous membranes are moist.      Pharynx: Oropharynx is clear.   Eyes:      General: No scleral icterus.     Pupils: Pupils are equal, round, and reactive to light.   Cardiovascular:      Rate and Rhythm: Normal rate.   Pulmonary:      Effort: Pulmonary effort is normal. No respiratory distress.   Abdominal:      General: There is no distension.      Palpations: Abdomen is soft.      Tenderness: There is no abdominal tenderness. There is no guarding or rebound.   Genitourinary:     Comments: Sacral decubitus ulcer. See image below.  Musculoskeletal:         General: No deformity.      Cervical back: Neck supple.   Neurological:      Mental Status: He is alert.   Psychiatric:         Behavior: Behavior is cooperative.       Sacral decubitus ulcer:        LABORATORY VALUES:   Recent Labs     10/17/23  1413 10/18/23  0147   WBC 7.7 9.4   RBC 3.47* 2.79*   HEMOGLOBIN 10.3* 8.3*   HEMATOCRIT 31.4* 25.3*   MCV 90.5 90.7   MCH 29.7 29.7   MCHC 32.8 32.8   RDW 63.5* 63.4*   PLATELETCT 88* 86*   MPV 11.5 11.6     Recent Labs     10/17/23  1413 10/18/23  0147   SODIUM 139 139   POTASSIUM 3.8 3.6   CHLORIDE 106 109   CO2 22 19*   GLUCOSE 89 72   BUN 25* 24*   CREATININE 1.08 1.13   CALCIUM 8.3* 7.7*     Recent Labs     10/17/23  1413 10/18/23  0147   ASTSGOT 23 28   ALTSGPT 20 16   TBILIRUBIN 0.7 0.5    ALKPHOSPHAT 173* 135*   GLOBULIN 3.2 see below            IMAGING:   DX-CHEST-PORTABLE (1 VIEW)   Final Result      No radiographic evidence of acute cardiopulmonary disease.          ASSESSMENT AND PLAN:     Sacral decubitus ulcer  Assessment & Plan  Sacral decubitus ulcer felt to be inadequately debrided at bedside.  Plan for debridement in the OR tomorrow.  NPO at midnight.  The risks, benefits, and alternatives were discussed with the patient. Risks include, but are not limited to: bleeding; infection; the need for additional procedures; poor functional or cosmetic outcome; as well as adverse cardiac and neurologic events. All of his questions were answered and he agreed to proceed to the operating room.        DISPOSITION: Medical evaluation and admission. The patient was admitted to the Medical Service prior to surgical consultation. [facility] Acute Care Surgery [place] Service will follow.     ____________________________________     Amor Medrano M.D.    DD: 10/18/2023  2:30 PM    AAST Grading System for EGS Conditions  ACS NSQIP Surgical Risk Calculator

## 2023-10-18 NOTE — ASSESSMENT & PLAN NOTE
Patient has history of A-fib, anticoagulated.  We will continue his home Xarelto.  -Continue home Xarelto  -Telemetry monitoring

## 2023-10-18 NOTE — ASSESSMENT & PLAN NOTE
Patient has history of sacral decubitus ulcer.  He resides in skilled nursing facility. Wound large and deep, although does not have infectious picture at this time    -agree w/ ID pharm discussion on d/c abx at this time and restarting if s/s of infection arise  -s/p OR for debridement, recommendations of wound vac and wound care per surgery   - Wound care with wound VAC in place

## 2023-10-18 NOTE — DISCHARGE PLANNING
Spoke to be patient. He stated he lives in [place] and asked me to call his grandson. Dr. Conte asked for Advanced Directives information from Nursing home or group home and to find out who is his decision maker. Spoke to RN Manager at [facility]. He is a full code there. Grandson is who they talk to. They were unaware he lived in a group home. I notified physician and I will try to reach grandson.

## 2023-10-18 NOTE — ASSESSMENT & PLAN NOTE
Patient has altered mental status reported at admission, however per chart review patient is often AxOx2 or AxOx1. He is currently not showing signs of infection.  Patient has been a times O x1 since admission without any fluctuations in his mental status.    - Continue to monitor  -We will attempt to reach grandson again for discussion of further options of outpatient care  - Palliative consult placed, likely will not see until Monday

## 2023-10-18 NOTE — WOUND TEAM
[facility]  Inpatient Services  Initial Wound and Skin Care Evaluation    Admission Date: 10/17/2023     Last order of IP CONSULT TO WOUND CARE was found on 10/17/2023 from Hospital Encounter on 10/17/2023     HPI, PMH, SH: Reviewed    History reviewed. No pertinent surgical history.  Social History     Tobacco Use    Smoking status: Never    Smokeless tobacco: Never   Substance Use Topics    Alcohol use: Not on file     Chief Complaint   Patient presents with    ALOC    Wound Infection     Diagnosis: Complicated UTI (urinary tract infection) [N39.0]    Unit where seen by Wound Team: [id number]/00     WOUND CONSULT RELATED TO:  Sacrum, penis, and R thigh    WOUND TEAM PLAN OF CARE - Frequency of Follow-up:   Nursing to follow dressing orders written for wound care. Contact wound team if area fails to progress, deteriorates or with any questions/concerns if something comes up before next scheduled follow up (See below as to whether wound is following and frequency of wound follow up)   Weekly - Sacrum, will re-assess on Fri for possible Vac placement pending POC and wound appearance  Not following, consult as needed  - Penis, R thigh, R posterior leg, and L heel    WOUND HISTORY:   88 y.o. male admitted from [facility] for AMS. Patient has a chronic graham. Was previously seen by wound team on 9/21 and noted to have a POA unstageable pressure injury to sacrum, although wound is much worse now when comparing clinical media images. Patient with spiraled Hydrofera blue dressing in place at time of initial assessment.       WOUND ASSESSMENT/LDA  Wound 09/20/23 Pressure Injury Sacrum POA unstageable (Active)   Date First Assessed/Time First Assessed: 09/20/23 0000   Present on Original Admission: Yes  Hand Hygiene Completed: Yes  Primary Wound Type: Pressure Injury  Location: Sacrum  Wound Description (Comments): POA unstageable      Assessments 10/18/2023 11:00 AM   Wound Image        Site Assessment Slough;Pink    Periwound Assessment Hyperpigmented;Rash;Satellite lesions   Margins Defined edges;Unattached edges   Closure Secondary intention   Drainage Amount Moderate   Drainage Description Serosanguineous;Cloudy/turbid   Treatments Cleansed;Offloading;CSWD - Conservative Sharp Wound Debridement   Wound Cleansing Dakin's Solution   Periwound Protectant Antifungal Therapy   Dressing Status Clean;Dry;Intact   Dressing Changed Changed   Dressing Cleansing/Solutions 1/4 Strength Dakin's Solution   Dressing Options Moist Roll Gauze;Offloading Dressing - Sacral   Dressing Change/Treatment Frequency Every Shift, and As Needed   NEXT Dressing Change/Treatment Date 10/19/23   NEXT Weekly Photo (Inpatient Only) 10/25/23   Wound Team Following Weekly   Wound Length (cm) 10 cm   Wound Width (cm) 5.3 cm   Wound Depth (cm) 4 cm   Wound Surface Area (cm^2) 53 cm^2   Wound Volume (cm^3) 212 cm^3   Wound Bed Slough (%) 100 %   Undermining (cm) 5.1 cm   Undermining of Wound, 1st Location From 12 o'clock;To 7 o'clock   Shape Irregular   Wound Odor Mild;Foul   WOUND NURSE ONLY - Time Spent with Patient (mins) 60       Wound 10/17/23 Pressure Injury Thigh Right & posterior calf POA sDTI (Active)   Date First Assessed/Time First Assessed: 10/17/23 2155   Primary Wound Type: Pressure Injury  Location: Thigh  Laterality: Right  Wound Description (Comments): & posterior calf POA sDTI      Assessments 10/18/2023 11:00 AM   Wound Image      Site Assessment Red;Purple   Periwound Assessment Clean;Dry;Intact   Margins Defined edges;Attached edges   Closure Adhesive bandage   Drainage Amount None   Treatments Cleansed   Wound Cleansing Normal Saline Irrigation   Dressing Status Clean;Dry;Intact   Dressing Changed New   Dressing Cleansing/Solutions Not Applicable   Dressing Options Hydrocolloid Thin   Dressing Change/Treatment Frequency Every 72 hrs, and As Needed   NEXT Dressing Change/Treatment Date 10/21/23   NEXT Weekly Photo (Inpatient Only)  10/25/23   Wound Team Following Not following   Pressure Injury Stage Deep Tissue       Wound 10/18/23 Pressure Injury Heel Left POA sDTI (Active)   Date First Assessed/Time First Assessed: 10/18/23 1118   Present on Original Admission: Yes  Primary Wound Type: Pressure Injury  Location: Heel  Laterality: Left  Wound Description (Comments): POA sDTI      Assessments 10/18/2023 11:00 AM   Wound Image     Site Assessment Purple;Red   Periwound Assessment Red   Margins Defined edges;Attached edges   Closure Adhesive bandage   Drainage Amount None   Treatments Offloading   Offloading/DME Heel float boot   Wound Cleansing Not Applicable   Dressing Status Clean;Dry;Intact   Dressing Changed Observed   Dressing Cleansing/Solutions Not Applicable   Dressing Options Offloading Dressing - Heel   Dressing Change/Treatment Frequency Every 72 hrs, and As Needed   NEXT Dressing Change/Treatment Date 10/21/23   NEXT Weekly Photo (Inpatient Only) 10/25/23   Wound Team Following Not following   Pressure Injury Stage Deep Tissue        Vascular:    FIONA:   No results found.    Lab Values:    Lab Results   Component Value Date/Time    WBC 9.4 10/18/2023 01:47 AM    RBC 2.79 (L) 10/18/2023 01:47 AM    HEMOGLOBIN 8.3 (L) 10/18/2023 01:47 AM    HEMATOCRIT 25.3 (L) 10/18/2023 01:47 AM    SEDRATEWES 59 (H) 10/17/2023 02:13 PM         Culture Results show:  No results found for this or any previous visit (from the past 720 hour(s)).    Pain Level/Medicated:   Reporting pain when performing CSWD, would benefit from pre-medication upon next dressing change.        INTERVENTIONS BY WOUND TEAM:  Chart and images reviewed. Discussed with bedside RN. All areas of concern (based on picture review, LDA review and discussion with bedside RN) have been thoroughly assessed. Documentation of areas based on significant findings. This RN in to assess patient. Performed standard wound care which includes appropriate positioning, dressing removal and  non-selective debridement. Pictures and measurements obtained weekly if/when required.    Wound:  Sacrum  Preparation for Dressing removal: Removed without difficulty  Cleansed/Non-selectively Debrided with:  Dakins and Gauze  Non-Excisional Conservative Sharp debridement: Slough, Necrotic Adipose, and Non-Viable/Devitalized tissue debrided away using scissors and forceps < 20cm2 debrided down to slough and non-viable/devitalized tissue.  Scant bleeding noted, controlled with manual pressure.  Sarah wound: Cleansed with Dakins and Gauze, Prepped with Miconazole MANUELITO  Primary Dressing:  Dakins moistened roll gauze  Secondary (Outer) Dressing: Offloading adhesive foam     Wound:  R thigh  Cleansed/Non-selectively Debrided with:  Wound cleanser and Gauze  Primary Dressing:  Hydrocolloid thin     Wound:  R posterior calf & L Heel  Primary Dressing:  Offloading adhesive foam    Advanced Wound Care Discharge Planning  Number of Clinicians necessary to complete wound care: 1-2 (for positioning)  Is patient requiring IV pain medications for dressing changes:  No   Length of time for dressing change 20 min. (This does not include chart review, pre-medication time, set up, clean up or time spent charting.)    Interdisciplinary consultation: Patient, Bedside RN, Idania MCCRAY (Wound RN), Hospitalist Dr. Quinteros    EVALUATION / RATIONALE FOR TREATMENT:     Date:  10/18/23  Wound Status:  Initial evaluation    POA unstageable pressure injury to sacrum. Upon first assessment, wound noted to have liquefying muscle, adipose, and stringy slough within wound bed. Bedside CSWD was done as much as patient could tolerate, however even with bedside CSWD, wound bed is still 100%. Surrounding skin noted to have hyperpigmented fungal-appearing rash. Miconazole cream utilized on this area.  Currently this wound has too much slough for a Wound Vac to be effective, patient would benefit from surgical debridement to reveal viable wound bed in which  afterwards Vac therapy could be initiated. Patient is incontinent and may benefit from ostomy creation in the future if fecal incontinence continuously compromises Vac seal.  Dakins wet to dry applied to initiate chemical debridement of nonviable tissue, decrease bioburden and odor. Dr. Quinteros updated of recommendations.    R thigh & R posterior calf with POA sDTI, source of pressure may be patient's chronic indwelling catheter. Hydrocolloid applied to provide moist, occlusive environment for wound healing.  L Heel with POA sDTI, area already covered with offloading adhesive foam, recommend continuation of this in addition to moon boots.         Goals: Steady decrease in wound area and depth weekly.    NURSING PLAN OF CARE ORDERS:  Dressing changes: See Dressing Care orders  RN Prevention Protocol    NUTRITION RECOMMENDATIONS   Wound Team Recommendations:  N/A    DIET ORDERS (From admission to next 24h)       Start     Ordered    10/17/23 1902  Diet Order Diet: Regular  ALL MEALS        Question:  Diet:  Answer:  Regular    10/17/23 1907                    PREVENTATIVE INTERVENTIONS:    Q shift Lawrence - performed per nursing policy  Q shift pressure point assessments - performed per nursing policy    Surface/Positioning  Low Airloss - Ordered  Standard/trauma mattress - Currently in Place  Reposition q 2 hours - Currently in Place  TAPs Turning system - Currently in Place  Waffle overlay  - Currently in Place    Offloading/Redistribution  Sacral offloading dressing (Silicone dressing) - Currently in Place  Heel offloading dressing (Silicone dressing) - Currently in Place  Heel float boots (Prevalon boot) - Ordered  Float Heels off Bed with Pillows - Currently in Place         Containment/Moisture Prevention    Granger Catheter - Currently in Place  Antifungal treatment - Applied this Visit    Anticipated discharge plans:  TBD, previously resided at [facility]. Will require out-patient follow-up for ongoing advanced  wound care needs.         Vac Discharge Needs:  Vac Discharge plan is purely a recommendation from wound team and not a requirement for discharge unless otherwise stated by physician.  Not Applicable Pt not on a wound vac

## 2023-10-18 NOTE — CARE PLAN
The patient is Stable - Low risk of patient condition declining or worsening    Shift Goals  Clinical Goals: monitor neuros/abx  Patient Goals: MARIVEL  Family Goals: MARIVEL    Progress made toward(s) clinical / shift goals:    Problem: Knowledge Deficit - Standard  Goal: Patient and family/care givers will demonstrate understanding of plan of care, disease process/condition, diagnostic tests and medications  Description: Target End Date:  1-3 days or as soon as patient condition allows    Document in Patient Education    1.  Patient and family/caregiver oriented to unit, equipment, visitation policy and means for communicating concern  2.  Complete/review Learning Assessment  3.  Assess knowledge level of disease process/condition, treatment plan, diagnostic tests and medications  4.  Explain disease process/condition, treatment plan, diagnostic tests and medications  Outcome: Progressing     Problem: Fluid Volume  Goal: Fluid volume balance will be maintained  Description: Target End Date:  Prior to discharge or change in level of care    Document on I/O flowsheet    1.  Monitor intake and output as ordered  2.  Promote oral intake as appropriate  3.  Report inadequate intake or output to physician  4.  Administer IV therapy as ordered  5.  Weights per provider order  6.  Assess for signs and symptoms of bleeding  7.  Monitor for signs of fluid overload (respiratory changes, edema, weight gain, increased abdominal girth)  8.  Monitor of signs for inadequate fluid volume (poor skin turgor, dry mucous membranes)  9.  Instruct patient on adherence to fluid restrictions  Outcome: Progressing     Problem: Mobility  Goal: Patient's capacity to carry out activities will improve  Description: Target End Date:  Prior to discharge or change in level of care    1.  Assess for barriers to mobility/activity  2.  Implement activity per interdisciplinary team recommendations  3.  Target activity level identified and  patient/family/caregiver aware of goal  4.  Provide assistive devices  5.  Instruct patient/caregiver on proper use of assistive/adaptive devices  6.  Schedule activities and rest periods to decrease effects of fatigue  7.  Encourage mobilization to extent of ability  8.  Maintain proper body alignment  9.  Provide adequate pain management to allow progressive mobilization  10. Implement pace maker precautions as needed  Outcome: Progressing     Problem: Skin Integrity  Goal: Skin integrity is maintained or improved  Description: Target End Date:  Prior to discharge or change in level of care    Document interventions on Skin Risk/Lawrence flowsheet groups and corresponding LDA    1.  Assess and monitor skin integrity, appearance and/or temperature  2.  Assess risk factors for impaired skin integrity and/or pressures ulcers  3.  Implement precautions to protect skin integrity in collaboration with interdisciplinary team  4.  Implement pressure ulcer prevention protocol if at risk for skin breakdown  5.  Confirm wound care consult if at risk for skin breakdown  6.  Ensure patient use of pressure relieving devices  (Low air loss bed, waffle overlay, heel protectors, ROHO cushion, etc)  Outcome: Progressing     Problem: Pain - Standard  Goal: Alleviation of pain or a reduction in pain to the patient’s comfort goal  Description: Target End Date:  Prior to discharge or change in level of care    Document on Vitals flowsheet    1.  Document pain using the appropriate pain scale per order or unit policy  2.  Educate and implement non-pharmacologic comfort measures (i.e. relaxation, distraction, massage, cold/heat therapy, etc.)  3.  Pain management medications as ordered  4.  Reassess pain after pain med administration per policy  5.  If opiods administered assess patient's response to pain medication is appropriate per POSS sedation scale  6.  Follow pain management plan developed in collaboration with patient and  interdisciplinary team (including palliative care or pain specialists if applicable)  Outcome: Progressing     Problem: Fall Risk  Goal: Patient will remain free from falls  Description: Target End Date:  Prior to discharge or change in level of care    Document interventions on the Anita eBck Fall Risk Assessment    1.  Assess for fall risk factors  2.  Implement fall precautions  Outcome: Progressing       Patient is not progressing towards the following goals:

## 2023-10-18 NOTE — H&P
[facility] MEDICINE HISTORY AND PHYSICAL     PATIENT ID:  NAME:  Trev Carl  MRN:               [id number]  YOB: 1935    Date of Admission: 10/17/2023     Attending: Sixto Quinteros M.d.    Resident: Yandel Manzano MD    Primary Care Physician:  DAVID Leslie    CC:    Chief Complaint   Patient presents with    ALOC    Wound Infection     HPI: Trev Carl is a 88 y.o. male who presented with altered mental status.  He was sent here from skilled nursing facility.  Patient is ANO x1, oriented to self but not place or time.  He does have a chronic Granger and has had ESBL in the past.  He has history of sacral decubitus ulcer.  Patient was a poor historian due to his mental status.    ERCourse:  In the ER, patient was ANO x1.  Vitals were significant for A-fib, heart rate going in and out of RVR.  He was hemodynamically stable.  Respiratory rate 14-28.  BP in the 110s/50s-70's.  Labs were significant for normocytic anemia with Hgb 10.3, elevated ESR at 59, GFR 66, BUN 25 and creatinine 1.08.  Lactic acid 2.5.  .  Patient does not have leukocytosis.  Urinalysis was obtained showing UTI.  EKG shows A-fib with QTc= 483.  Chest x-ray reassuring.  Previous echo,  LVEF=60% in September      REVIEW OF SYSTEMS:   Ten systems reviewed and were negative except as noted in the HPI.                PAST MEDICAL HISTORY:  Past Medical History:   Diagnosis Date    Hearing loss        PAST SURGICAL HISTORY:  History reviewed. No pertinent surgical history.    FAMILY HISTORY:  No family history on file.    SOCIAL HISTORY:   Patient unable to answer questions about social history    DIET:   Orders Placed This Encounter   Procedures    Diet Order Diet: Regular     Standing Status:   Standing     Number of Occurrences:   1     Order Specific Question:   Diet:     Answer:   Regular [1]       ALLERGIES:  No Known Allergies    OUTPATIENT MEDICATIONS:    Current Facility-Administered Medications:      [DISCONTINUED] senna-docusate (Pericolace Or Senokot S) 8.6-50 MG per tablet 2 Tablet, 2 Tablet, Oral, BID **AND** polyethylene glycol/lytes (Miralax) PACKET 1 Packet, 1 Packet, Oral, QDAY PRN **AND** magnesium hydroxide (Milk Of Magnesia) suspension 30 mL, 30 mL, Oral, QDAY PRN **AND** bisacodyl (Dulcolax) suppository 10 mg, 10 mg, Rectal, QDAY PRN, Yandel Manzano M.D.    lactated ringers infusion, , Intravenous, Continuous, Yandel Manzano M.D.    labetalol (Normodyne/Trandate) injection 10 mg, 10 mg, Intravenous, Q4HRS PRN, Yandel Manzano M.D.    acetaminophen (Tylenol) tablet 650 mg, 650 mg, Oral, Q6HRS PRN, Yandel Manzano M.D.    [START ON 10/18/2023] cefepime (Maxipime) 2 g in  mL IVPB, 2 g, Intravenous, Q12HRS, Yandel Manzano M.D.    melatonin tablet 3 mg, 3 mg, Oral, QHS, Yandel Manzano M.D.    [START ON 10/18/2023] memantine (Namenda) tablet 5 mg, 5 mg, Oral, DAILY, Yandel Manzano M.D.    [START ON 10/18/2023] omeprazole (PriLOSEC) capsule 20 mg, 20 mg, Oral, DAILY, Yandel Manzano M.D.    [START ON 10/18/2023] oxybutynin SR (Ditropan-XL) tablet 5 mg, 5 mg, Oral, DAILY, Yandel Manzano M.D.    [START ON 10/18/2023] potassium chloride SA (Kdur) tablet 10 mEq, 10 mEq, Oral, DAILY, Yandel Manzano M.D.    rivaroxaban (Xarelto) tablet 10 mg, 10 mg, Oral, DAILY AT 1800, Yandel Manzano M.D.    senna-docusate (Pericolace Or Senokot S) 8.6-50 MG per tablet 1 Tablet, 1 Tablet, Oral, BID, Yandel Manzano M.D.    tamsulosin (Flomax) capsule 0.4 mg, 0.4 mg, Oral, Q EVENING, Yandel Manazno M.D.    PHYSICAL EXAM:  Vitals:    10/17/23 1809 10/17/23 1842 10/17/23 1912 10/17/23 2033   BP: 116/65 (!) 119/91 113/57 107/53   Pulse: 95 78 78 63   Resp: (!) 22 16 14 15   Temp:    36.6 °C (97.9 °F)   TempSrc:    Temporal   SpO2: 98% 96% 97% 90%   Weight:       Height:       , Temp (24hrs), Av.1 °C (95.2 °F), Min:33.6 °C (92.5 °F), Max:36.6 °C (97.9 °F)  , Pulse Oximetry: 90 %, O2 (LPM): 0, O2  Delivery Device: None - Room Air    General: Pt resting in NAD, cooperative   Skin:  Pink, warm and dry.  No rashes  HEENT: NC/AT. PERRL. EOMI. MMM. No nasal discharge. Oropharynx nonerythematous without exudate/plaques  Neck:  Supple without lymphadenopathy or rigidity.  Lungs:  Symmetrical.  CTAB with no adventitious breath sounds.  Good air movement   Cardiovascular:  Normal S1/S2, irregular rhythm  Abdomen:  BS+, Soft, NT/ND. No masses noted.  Extremities:  Full range of motion. No gross deformities noted. 2+ pulses in all extremities.  Bilateral pitting edema equal in lower extremities  CNS:  A&Ox1, follows commands, Strength 5/5 in all extremities.         LAB TESTS:   Recent Labs     10/17/23  1413   WBC 7.7   RBC 3.47*   HEMOGLOBIN 10.3*   HEMATOCRIT 31.4*   MCV 90.5   MCH 29.7   RDW 63.5*   PLATELETCT 88*   MPV 11.5   NEUTSPOLYS 66.10   LYMPHOCYTES 24.00   MONOCYTES 6.60   EOSINOPHILS 0.80   BASOPHILS 0.00   RBCMORPHOLO Present         Recent Labs     10/17/23  1413   SODIUM 139   POTASSIUM 3.8   CHLORIDE 106   CO2 22   BUN 25*   CREATININE 1.08   CALCIUM 8.3*   ALBUMIN 2.0*       CULTURES:   Results       Procedure Component Value Units Date/Time    URINALYSIS [[id number]]  (Abnormal) Collected: 10/17/23 1559    Order Status: Completed Specimen: Urine Updated: 10/17/23 1651     Color DK Yellow     Character Turbid     Specific Gravity 1.019     Ph 6.0     Glucose Negative mg/dL      Ketones Trace mg/dL      Protein 300 mg/dL      Bilirubin Small     Urobilinogen, Urine 1.0     Nitrite Positive     Leukocyte Esterase Large     Occult Blood Large     Micro Urine Req Microscopic    Narrative:      Indication for culture:->Acute unexplained altered mental  status ONLY after ruling out other recognized cause  Release to patient->Immediate    URINE CULTURE(NEW) [[id number]] Collected: 10/17/23 1559    Order Status: Sent Specimen: Urine, Clean Catch Updated: 10/17/23 1621    Narrative:      Indication for  "culture:->Acute unexplained altered mental  status ONLY after ruling out other recognized cause  Release to patient->Immediate    Blood Culture [[id number]] Collected: 10/17/23 1440    Order Status: Sent Specimen: Blood from Peripheral Updated: 10/17/23 1538    Narrative:      1 of 2 for Blood Culture x 2 sites order. Per Hospital  Policy: Only change Specimen Src: to \"Line\" if specified by  physician order.  Release to patient->Immediate    Blood Culture [[id number]] Collected: 10/17/23 1413    Order Status: Sent Specimen: Blood from Peripheral Updated: 10/17/23 1452    Narrative:      2 of 2 blood culture x2  Sites order. Per Hospital Policy:  Only change Specimen Src: to \"Line\" if specified by physician  order.  Release to patient->Immediate            IMAGES:  DX-CHEST-PORTABLE (1 VIEW)   Final Result      No radiographic evidence of acute cardiopulmonary disease.        ASSESSMENT/PLAN: 88 y.o. male admitted for complicated UTI with AMS.    Complicated UTI (urinary tract infection)  Patient presents with altered mental status and UA positive for UTI.  No leukocytosis, patient does not appear septic.  Lactic acid was elevated at 2.5, repeat was 2.7.  He does have indwelling Granger catheter and frequent UTIs.  Last month, patient had Klebsiella ESBL UTI and bacteremia.  -Urine culture  -Trend lactic acid  -IV fluids  -Cefepime, adjust antibiotic as appropriate after cultures return    Atrial fibrillation (HCC)  Patient has history of A-fib, anticoagulated.  We will continue his home Xarelto.  -Continue home Xarelto  -Telemetry monitoring    Anemia  Normocytic.  Patient has chronic anemia, currently at his baseline at 10.  Likely anemia of chronic disease or combined picture.  -Iron/TIBC    AMS (altered mental status)  Patient has altered mental status, most likely due to UTI.  We will treat UTI with antibiotics and assess for improvement.    Sacral decubitus ulcer  Patient has history of sacral decubitus ulcer.  He " resides in skilled nursing facility.  -Wound care consult    Core Measures:  Fluids:   Lines: PIV  Abx: Cefepime  Diet: Regular  PPX: Anticoagulated  DISPO: Inpatient telemetry    CODE STATUS: Full code      Yandel Manzano MD  PGY3  [facility]

## 2023-10-18 NOTE — PROGRESS NOTES
Monitor Summary     Rhythm: A-fib  Heart Rate: 73-82  Ectopy: R PVC  Measurement:--/.06/--

## 2023-10-18 NOTE — DISCHARGE PLANNING
Case Management Discharge Planning    Admission Date: 10/17/2023  GMLOS: 2.9  ALOS: 1    6-Clicks ADL Score: 13  6-Clicks Mobility Score: 10  PT and/or OT Eval ordered: No  Post-acute Referrals Ordered: No  Post-acute Choice Obtained: No  Has referral(s) been sent to post-acute provider:  No      Anticipated Discharge Dispo: Discharge Disposition: D/T to SNF with Medicare cert in anticipation of skilled care (03)    DME Needed: No    Action(s) Taken: DC Assessment Complete (See below)    Escalations Completed: Pending Discharge Destination    Medically Clear: No    Next Steps: mare is [facility]. Has been in and out of the hospital between [facility] since June. Lives in a Group home but has only been there 3 week this summer. Was at [facility] and then [facility]. Came to us from [facility]. Complex UTI, huge decubitus that might require a wound Vac. D/c planning pending. Advance directives placed in chart  Media. Cm following closely for d./c needs.     Barriers to Discharge: Medical clearance, Pending Placement, Pending PT Evaluation, and Pending Procedures    Is the patient up for discharge tomorrow: No     Care Transition Team Assessment  Case management role explained to mare. Attempted to explaine to patient but was not understanding our conversation. Patient did not respond appropriately. 88 y.o. male who presented with altered mental status.  He was sent here from skilled nursing facility.  Patient is ANO x1, oriented to self but not place or time.  He does have a chronic Granger and has had ESBL in the past.  He has history of sacral decubitus ulcer.  Patient was a poor historian due to his mental status. Dx: Complicated UTI, afib, anemia,AMS, Sacral decubitus ulcer.     Information Source  Orientation Level: Disoriented to place, Disoriented to time, Disoriented to situation  Information Given By: Relative (freya garvey Bro)  Informant's Name: Bro  Who is responsible for  making decisions for patient? : POA  Name(s) of Primary Decision Maker: Bro    Readmission Evaluation  Is this a readmission?: Yes - unplanned readmission  Why do you think you were readmitted?: unable to answer  Was an appointment arranged for you prior to discharge?: No  Were there new prescriptions you were supposed to fill after you were discharged?: Yes, prescriptions filled  Did you understand your discharge instructions?: No  Please explain: not able to understand  Did you have enough support after your last discharge?: Yes (went to [facility])    Elopement Risk  Legal Hold: No  Ambulatory or Self Mobile in Wheelchair: No-Not an Elopement Risk  Elopement Risk: Not at Risk for Elopement    Interdisciplinary Discharge Planning  Primary Care Physician: Yina Oneil  Lives with - Patient's Self Care Capacity: Attendant / Paid Care Giver, Other (Comments) (group home and a snf [facility])  Patient or legal guardian wants to designate a caregiver: Yes  Caregiver name: bor  Support Systems: Family Member(s)  Housing / Facility: Skilled Nursing Facility, Group Home  Name of Care Facility: [facility] and [facility]  Do You Take your Prescribed Medications Regularly: Yes  Able to Return to Previous ADL's: No  Mobility Issues: Yes  Patient Prefers to be Discharged to:: skilled nursing or the group home  Assistance Needed: Yes    Discharge Preparedness  What is your plan after discharge?: Skilled nursing facility  What are your discharge supports?: Child  Prior Functional Level: Needs Assist with Activities of Daily Living    Functional Assesment  Prior Functional Level: Needs Assist with Activities of Daily Living    Finances  Financial Barriers to Discharge: No  Prescription Coverage: Yes    Vision / Hearing Impairment  Vision Impairment : Yes  Hearing Impairment : Yes  Hearing Impairment: Both Ears  Does Pt Need Special Equipment for the Hearing Impaired?: No         Advance Directive  Advance Directive?: DPOA for  Health Care  Durable Power of  Name and Contact : izabel ([phone])    Domestic Abuse  Have you ever been the victim of abuse or violence?: No    Psychological Assessment  History of Substance Abuse: None  History of Psychiatric Problems: No  Non-compliant with Treatment: No  Newly Diagnosed Illness: Yes    Discharge Risks or Barriers  Discharge risks or barriers?: Post-acute placement / services, Complex medical needs  Patient risk factors: Vulnerable adult, Readmission, Cognitive / sensory / physical deficit, Complex medical needs, Multiple ED visits    Anticipated Discharge Information  Discharge Disposition: D/T to SNF with Medicare cert in anticipation of skilled care (03)

## 2023-10-18 NOTE — ASSESSMENT & PLAN NOTE
Patient presents with altered mental status and UA positive for UTI.  No leukocytosis, patient does not appear septic.  Lactic acid was elevated at 2.5, repeat was 2.7.  He does have indwelling Granger catheter and frequent UTIs.  Last month, patient had Klebsiella ESBL UTI and bacteremia.  Lactic stable, HD stable, no leukocytosis, Urine culture grew klebsiella similar to prior hospitalization. 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    -IV maintenance fluids  - abx were discontinued as UTI is unlikely given no leukocytosis, ID pharm agreed

## 2023-10-18 NOTE — PROGRESS NOTES
4 Eyes Skin Assessment Completed by LUCY Cedillo and LUCY Mary.    Head WDL  Ears WDL  Nose WDL  Mouth Redness and scabbing  Neck WDL  Breast/Chest WDL  Shoulder Blades WDL  Spine WDL  (R) Arm/Elbow/Hand Bruising  (L) Arm/Elbow/Hand Bruising  Abdomen WDL  Groin Redness, Excoriation, and Scab on penis  Scrotum/Coccyx/Buttocks redness, discoloration, unstageable wound, slough  (R) Leg Bruising, Swelling, and Redness  Pressure injuries to R inner thigh and R calf  (L) Leg Bruising, Swelling, and Redness  (R) Heel/Foot/Toe Redness, Blanching, and Boggy  (L) Heel/Foot/Toe Redness, Blanching, and Boggy          Devices In Places Tele Box, Blood Pressure Cuff, Pulse Ox, and Granger      Interventions In Place Sacral Mepilex, Waffle Overlay, TAP System, Pillows, Q2 Turns, Heels Loaded W/Pillows, and Pressure Redistribution Mattress    Possible Skin Injury Yes    Pictures Uploaded Into Epic Yes  Wound Consult Placed Yes  RN Wound Prevention Protocol Ordered Yes

## 2023-10-18 NOTE — PROGRESS NOTES
Report received from LUCY Reddy. Picked up pt from ED. Pt is oriented to room and call light. A-fib 80s on the monitor. A&O to self and place only. Pt is on RA.

## 2023-10-18 NOTE — DIETARY
"Nutrition Services: Update   Day 1 of admit.  Trev Carl is a 88 y.o. male with admitting DX of Complicated UTI (urinary tract infection) [N39.0]    Pt is currently on Regular diet.   Wt: 80.1 kg via bed scale - 10/18/23    RD alerted for severe, unstageable pressure injury of sacrum. Wound team states \"wound noted to have liquefying muscle, adipose, and stringy slough within wound bed\". RD met with pt at bedside to discuss adequate nutrient intake to promote wound healing. Pt informed on the importance of protein and agrees to have double protein portions on meal trays. RD able to discuss Rick wound supplement with pt which he agrees to try BID.       Recommendations/Plan:  Continue current diet order, Regula  Addition of double protein portions with all meals   Addition of ONS, Rick BID    Encourage intake of meals/ONS  Document intake of all meals as % taken in ADL's to provide interdisciplinary communication across all shifts.   Achieve PO intake > 50% of meals and 100% of Rick   Monitor weight.  Nutrition rep will continue to see patient for ongoing meal and snack preferences.    RD following   "

## 2023-10-18 NOTE — ASSESSMENT & PLAN NOTE
Normocytic.  Patient has chronic anemia, currently at his baseline at 10.  Likely anemia of chronic disease or combined picture.  -Iron/TIBC were low

## 2023-10-19 ENCOUNTER — ANESTHESIA (OUTPATIENT)
Dept: SURGERY | Facility: MEDICAL CENTER | Age: 88
DRG: 981 | End: 2023-10-19
Payer: MEDICARE

## 2023-10-19 ENCOUNTER — ANESTHESIA EVENT (OUTPATIENT)
Dept: SURGERY | Facility: MEDICAL CENTER | Age: 88
DRG: 981 | End: 2023-10-19
Payer: MEDICARE

## 2023-10-19 LAB
ALBUMIN SERPL BCP-MCNC: 1.4 G/DL (ref 3.2–4.9)
ALBUMIN/GLOB SERPL: ABNORMAL G/DL
ALP SERPL-CCNC: 140 U/L (ref 30–99)
ALT SERPL-CCNC: 17 U/L (ref 2–50)
ANION GAP SERPL CALC-SCNC: 8 MMOL/L (ref 7–16)
AST SERPL-CCNC: 24 U/L (ref 12–45)
BACTERIA UR CULT: ABNORMAL
BACTERIA UR CULT: ABNORMAL
BASOPHILS # BLD AUTO: 0.7 % (ref 0–1.8)
BASOPHILS # BLD: 0.04 K/UL (ref 0–0.12)
BILIRUB SERPL-MCNC: 0.3 MG/DL (ref 0.1–1.5)
BUN SERPL-MCNC: 22 MG/DL (ref 8–22)
CALCIUM ALBUM COR SERPL-MCNC: 10 MG/DL (ref 8.5–10.5)
CALCIUM SERPL-MCNC: 7.9 MG/DL (ref 8.5–10.5)
CHLORIDE SERPL-SCNC: 111 MMOL/L (ref 96–112)
CO2 SERPL-SCNC: 22 MMOL/L (ref 20–33)
CREAT SERPL-MCNC: 1.1 MG/DL (ref 0.5–1.4)
EOSINOPHIL # BLD AUTO: 0.27 K/UL (ref 0–0.51)
EOSINOPHIL NFR BLD: 4.8 % (ref 0–6.9)
ERYTHROCYTE [DISTWIDTH] IN BLOOD BY AUTOMATED COUNT: 63.3 FL (ref 35.9–50)
GFR SERPLBLD CREATININE-BSD FMLA CKD-EPI: 64 ML/MIN/1.73 M 2
GLOBULIN SER CALC-MCNC: ABNORMAL G/DL (ref 1.9–3.5)
GLUCOSE SERPL-MCNC: 84 MG/DL (ref 65–99)
HCT VFR BLD AUTO: 25.7 % (ref 42–52)
HGB BLD-MCNC: 8.4 G/DL (ref 14–18)
IMM GRANULOCYTES # BLD AUTO: 0.05 K/UL (ref 0–0.11)
IMM GRANULOCYTES NFR BLD AUTO: 0.9 % (ref 0–0.9)
LYMPHOCYTES # BLD AUTO: 1.64 K/UL (ref 1–4.8)
LYMPHOCYTES NFR BLD: 29.4 % (ref 22–41)
MCH RBC QN AUTO: 29.4 PG (ref 27–33)
MCHC RBC AUTO-ENTMCNC: 32.7 G/DL (ref 32.3–36.5)
MCV RBC AUTO: 89.9 FL (ref 81.4–97.8)
MONOCYTES # BLD AUTO: 0.42 K/UL (ref 0–0.85)
MONOCYTES NFR BLD AUTO: 7.5 % (ref 0–13.4)
NEUTROPHILS # BLD AUTO: 3.16 K/UL (ref 1.82–7.42)
NEUTROPHILS NFR BLD: 56.7 % (ref 44–72)
NRBC # BLD AUTO: 0 K/UL
NRBC BLD-RTO: 0 /100 WBC (ref 0–0.2)
PLATELET # BLD AUTO: 67 K/UL (ref 164–446)
PLATELETS.RETICULATED NFR BLD AUTO: 4.3 % (ref 0.6–13.1)
PMV BLD AUTO: 10.7 FL (ref 9–12.9)
POTASSIUM SERPL-SCNC: 3.5 MMOL/L (ref 3.6–5.5)
PROT SERPL-MCNC: 4.3 G/DL (ref 6–8.2)
RBC # BLD AUTO: 2.86 M/UL (ref 4.7–6.1)
SIGNIFICANT IND 70042: ABNORMAL
SITE SITE: ABNORMAL
SODIUM SERPL-SCNC: 141 MMOL/L (ref 135–145)
SOURCE SOURCE: ABNORMAL
WBC # BLD AUTO: 5.6 K/UL (ref 4.8–10.8)

## 2023-10-19 PROCEDURE — 0KBP0ZZ EXCISION OF LEFT HIP MUSCLE, OPEN APPROACH: ICD-10-PCS | Performed by: SURGERY

## 2023-10-19 PROCEDURE — 700105 HCHG RX REV CODE 258

## 2023-10-19 PROCEDURE — A9270 NON-COVERED ITEM OR SERVICE: HCPCS

## 2023-10-19 PROCEDURE — 700111 HCHG RX REV CODE 636 W/ 250 OVERRIDE (IP): Mod: JZ | Performed by: ANESTHESIOLOGY

## 2023-10-19 PROCEDURE — 700102 HCHG RX REV CODE 250 W/ 637 OVERRIDE(OP)

## 2023-10-19 PROCEDURE — 160048 HCHG OR STATISTICAL LEVEL 1-5: Performed by: SURGERY

## 2023-10-19 PROCEDURE — 0KBN0ZZ EXCISION OF RIGHT HIP MUSCLE, OPEN APPROACH: ICD-10-PCS | Performed by: SURGERY

## 2023-10-19 PROCEDURE — 700101 HCHG RX REV CODE 250: Performed by: ANESTHESIOLOGY

## 2023-10-19 PROCEDURE — 700102 HCHG RX REV CODE 250 W/ 637 OVERRIDE(OP): Performed by: BEHAVIOR ANALYST

## 2023-10-19 PROCEDURE — 11043 DBRDMT MUSC&/FSCA 1ST 20/<: CPT | Performed by: SURGERY

## 2023-10-19 PROCEDURE — 160009 HCHG ANES TIME/MIN: Performed by: SURGERY

## 2023-10-19 PROCEDURE — 85055 RETICULATED PLATELET ASSAY: CPT

## 2023-10-19 PROCEDURE — 11046 DBRDMT MUSC&/FSCA EA ADDL: CPT | Performed by: SURGERY

## 2023-10-19 PROCEDURE — 160038 HCHG SURGERY MINUTES - EA ADDL 1 MIN LEVEL 2: Performed by: SURGERY

## 2023-10-19 PROCEDURE — 85025 COMPLETE CBC W/AUTO DIFF WBC: CPT

## 2023-10-19 PROCEDURE — 160027 HCHG SURGERY MINUTES - 1ST 30 MINS LEVEL 2: Performed by: SURGERY

## 2023-10-19 PROCEDURE — 99232 SBSQ HOSP IP/OBS MODERATE 35: CPT | Mod: GC | Performed by: FAMILY MEDICINE

## 2023-10-19 PROCEDURE — A9270 NON-COVERED ITEM OR SERVICE: HCPCS | Performed by: BEHAVIOR ANALYST

## 2023-10-19 PROCEDURE — 700101 HCHG RX REV CODE 250: Performed by: SURGERY

## 2023-10-19 PROCEDURE — 700111 HCHG RX REV CODE 636 W/ 250 OVERRIDE (IP): Performed by: ANESTHESIOLOGY

## 2023-10-19 PROCEDURE — 770020 HCHG ROOM/CARE - TELE (206)

## 2023-10-19 PROCEDURE — 160002 HCHG RECOVERY MINUTES (STAT): Performed by: SURGERY

## 2023-10-19 PROCEDURE — 160035 HCHG PACU - 1ST 60 MINS PHASE I: Performed by: SURGERY

## 2023-10-19 PROCEDURE — 36415 COLL VENOUS BLD VENIPUNCTURE: CPT

## 2023-10-19 PROCEDURE — 80053 COMPREHEN METABOLIC PANEL: CPT

## 2023-10-19 RX ORDER — ONDANSETRON 2 MG/ML
INJECTION INTRAMUSCULAR; INTRAVENOUS PRN
Status: DISCONTINUED | OUTPATIENT
Start: 2023-10-19 | End: 2023-10-19 | Stop reason: SURG

## 2023-10-19 RX ORDER — HALOPERIDOL 5 MG/ML
1 INJECTION INTRAMUSCULAR
Status: DISCONTINUED | OUTPATIENT
Start: 2023-10-19 | End: 2023-10-19 | Stop reason: HOSPADM

## 2023-10-19 RX ORDER — ROCURONIUM BROMIDE 10 MG/ML
INJECTION, SOLUTION INTRAVENOUS PRN
Status: DISCONTINUED | OUTPATIENT
Start: 2023-10-19 | End: 2023-10-19 | Stop reason: SURG

## 2023-10-19 RX ORDER — SODIUM CHLORIDE, SODIUM LACTATE, POTASSIUM CHLORIDE, CALCIUM CHLORIDE 600; 310; 30; 20 MG/100ML; MG/100ML; MG/100ML; MG/100ML
INJECTION, SOLUTION INTRAVENOUS CONTINUOUS
Status: DISCONTINUED | OUTPATIENT
Start: 2023-10-19 | End: 2023-10-19 | Stop reason: HOSPADM

## 2023-10-19 RX ORDER — PHENYLEPHRINE HYDROCHLORIDE 10 MG/ML
INJECTION, SOLUTION INTRAMUSCULAR; INTRAVENOUS; SUBCUTANEOUS PRN
Status: DISCONTINUED | OUTPATIENT
Start: 2023-10-19 | End: 2023-10-19 | Stop reason: SURG

## 2023-10-19 RX ORDER — SODIUM HYPOCHLORITE 1.25 MG/ML
SOLUTION TOPICAL
Status: DISCONTINUED | OUTPATIENT
Start: 2023-10-19 | End: 2023-10-19 | Stop reason: HOSPADM

## 2023-10-19 RX ORDER — OXYCODONE HCL 5 MG/5 ML
5 SOLUTION, ORAL ORAL
Status: DISCONTINUED | OUTPATIENT
Start: 2023-10-19 | End: 2023-10-19 | Stop reason: HOSPADM

## 2023-10-19 RX ORDER — OXYCODONE HCL 5 MG/5 ML
10 SOLUTION, ORAL ORAL
Status: DISCONTINUED | OUTPATIENT
Start: 2023-10-19 | End: 2023-10-19 | Stop reason: HOSPADM

## 2023-10-19 RX ORDER — EPHEDRINE SULFATE 50 MG/ML
INJECTION, SOLUTION INTRAVENOUS PRN
Status: DISCONTINUED | OUTPATIENT
Start: 2023-10-19 | End: 2023-10-19 | Stop reason: SURG

## 2023-10-19 RX ORDER — IPRATROPIUM BROMIDE AND ALBUTEROL SULFATE 2.5; .5 MG/3ML; MG/3ML
3 SOLUTION RESPIRATORY (INHALATION)
Status: DISCONTINUED | OUTPATIENT
Start: 2023-10-19 | End: 2023-10-19 | Stop reason: HOSPADM

## 2023-10-19 RX ORDER — DEXAMETHASONE SODIUM PHOSPHATE 4 MG/ML
INJECTION, SOLUTION INTRA-ARTICULAR; INTRALESIONAL; INTRAMUSCULAR; INTRAVENOUS; SOFT TISSUE PRN
Status: DISCONTINUED | OUTPATIENT
Start: 2023-10-19 | End: 2023-10-19 | Stop reason: SURG

## 2023-10-19 RX ORDER — ONDANSETRON 2 MG/ML
4 INJECTION INTRAMUSCULAR; INTRAVENOUS
Status: DISCONTINUED | OUTPATIENT
Start: 2023-10-19 | End: 2023-10-19 | Stop reason: HOSPADM

## 2023-10-19 RX ORDER — LIDOCAINE HYDROCHLORIDE 20 MG/ML
INJECTION, SOLUTION EPIDURAL; INFILTRATION; INTRACAUDAL; PERINEURAL PRN
Status: DISCONTINUED | OUTPATIENT
Start: 2023-10-19 | End: 2023-10-19 | Stop reason: SURG

## 2023-10-19 RX ADMIN — SODIUM HYPOCHLORITE 50 ML: 1.25 SOLUTION TOPICAL at 05:47

## 2023-10-19 RX ADMIN — EPHEDRINE SULFATE 10 MG: 50 INJECTION INTRAVENOUS at 11:11

## 2023-10-19 RX ADMIN — MICONAZOLE NITRATE: 20 CREAM TOPICAL at 05:47

## 2023-10-19 RX ADMIN — PHENYLEPHRINE HYDROCHLORIDE 100 MCG: 10 INJECTION INTRAVENOUS at 11:09

## 2023-10-19 RX ADMIN — PROPOFOL 100 MG: 10 INJECTION, EMULSION INTRAVENOUS at 10:57

## 2023-10-19 RX ADMIN — SODIUM CHLORIDE, POTASSIUM CHLORIDE, SODIUM LACTATE AND CALCIUM CHLORIDE: 600; 310; 30; 20 INJECTION, SOLUTION INTRAVENOUS at 16:39

## 2023-10-19 RX ADMIN — SODIUM CHLORIDE, POTASSIUM CHLORIDE, SODIUM LACTATE AND CALCIUM CHLORIDE: 600; 310; 30; 20 INJECTION, SOLUTION INTRAVENOUS at 03:08

## 2023-10-19 RX ADMIN — FENTANYL CITRATE 25 MCG: 50 INJECTION, SOLUTION INTRAMUSCULAR; INTRAVENOUS at 12:22

## 2023-10-19 RX ADMIN — POTASSIUM CHLORIDE 10 MEQ: 1500 TABLET, EXTENDED RELEASE ORAL at 05:44

## 2023-10-19 RX ADMIN — TAMSULOSIN HYDROCHLORIDE 0.4 MG: 0.4 CAPSULE ORAL at 17:11

## 2023-10-19 RX ADMIN — Medication 3 MG: at 20:55

## 2023-10-19 RX ADMIN — MINOCYCLINE HYDROCHLORIDE 100 MG: 50 CAPSULE ORAL at 05:43

## 2023-10-19 RX ADMIN — DEXAMETHASONE SODIUM PHOSPHATE 4 MG: 4 INJECTION INTRA-ARTICULAR; INTRALESIONAL; INTRAMUSCULAR; INTRAVENOUS; SOFT TISSUE at 10:57

## 2023-10-19 RX ADMIN — OMEPRAZOLE 20 MG: 20 CAPSULE, DELAYED RELEASE ORAL at 05:43

## 2023-10-19 RX ADMIN — MEMANTINE HYDROCHLORIDE 5 MG: 10 TABLET ORAL at 05:44

## 2023-10-19 RX ADMIN — ONDANSETRON 4 MG: 2 INJECTION INTRAMUSCULAR; INTRAVENOUS at 10:57

## 2023-10-19 RX ADMIN — LIDOCAINE HYDROCHLORIDE 100 MG: 20 INJECTION, SOLUTION EPIDURAL; INFILTRATION; INTRACAUDAL at 10:57

## 2023-10-19 RX ADMIN — MINOCYCLINE HYDROCHLORIDE 100 MG: 50 CAPSULE ORAL at 17:11

## 2023-10-19 RX ADMIN — PHENYLEPHRINE HYDROCHLORIDE 100 MCG: 10 INJECTION INTRAVENOUS at 11:14

## 2023-10-19 RX ADMIN — PHENYLEPHRINE HYDROCHLORIDE 100 MCG: 10 INJECTION INTRAVENOUS at 11:05

## 2023-10-19 RX ADMIN — EPHEDRINE SULFATE 10 MG: 50 INJECTION INTRAVENOUS at 11:07

## 2023-10-19 RX ADMIN — SUGAMMADEX 200 MG: 100 INJECTION, SOLUTION INTRAVENOUS at 11:49

## 2023-10-19 RX ADMIN — ROCURONIUM BROMIDE 50 MG: 50 INJECTION, SOLUTION INTRAVENOUS at 10:57

## 2023-10-19 RX ADMIN — PHENYLEPHRINE HYDROCHLORIDE 100 MCG: 10 INJECTION INTRAVENOUS at 10:57

## 2023-10-19 ASSESSMENT — PAIN DESCRIPTION - PAIN TYPE
TYPE: SURGICAL PAIN

## 2023-10-19 ASSESSMENT — FIBROSIS 4 INDEX: FIB4 SCORE: 7.65

## 2023-10-19 ASSESSMENT — PAIN SCALES - GENERAL: PAIN_LEVEL: 0

## 2023-10-19 NOTE — ANESTHESIA POSTPROCEDURE EVALUATION
Patient: Trev Carl    Procedure Summary     Date: 10/19/23 Room / Location: [facility] 09 / SURGERY [facility]    Anesthesia Start: 1056 Anesthesia Stop: 1202    Procedure: IRRIGATION AND DEBRIDEMENT, SACRUM (Back) Diagnosis: (sacral decubitus ulcer)    Surgeons: Amor Medrano M.D. Responsible Provider: Anam Page M.D.    Anesthesia Type: general ASA Status: 2          Final Anesthesia Type: general  Last vitals  BP   Blood Pressure : 93/57    Temp   36 °C (96.8 °F)    Pulse   82   Resp   14    SpO2   99 %      Anesthesia Post Evaluation    Patient location during evaluation: PACU  Patient participation: complete - patient participated  Level of consciousness: sleepy but conscious  Pain score: 0    Airway patency: patent  Anesthetic complications: no  Cardiovascular status: hemodynamically stable  Respiratory status: acceptable  Hydration status: euvolemic    PONV: none          No notable events documented.     Nurse Pain Score: 0 (NPRS)

## 2023-10-19 NOTE — OR NURSING
Patient confused.  Tried calling GAGAN Wood and Bro- unable to reach either one by phone for consent at this time.

## 2023-10-19 NOTE — PROGRESS NOTES
Report received from Cassandra PACU RN. Updated on POC.  Assumed care of patient upon arrival to unit. Patient currently A & O x 1; on 6 L O2 oxymask; up without complaints of acute pain. Pt placed on monitor, monitor room notified, Afib 81. No sign and held orders to release. Post op vitals initiated. Dr. Conte notified regarding new darker urine. Patient oriented to unit and to call light system. Call light within reach. Pt educated to fall risk. Fall precautions in place. Pt provided with personal grooming items. Bed locked and in lowest position. All questions answered. No other needs indicated at this time.

## 2023-10-19 NOTE — CARE PLAN
"  Problem: Skin Integrity  Goal: Skin integrity is maintained or improved  Outcome: Progressing  Note: Assess skin and monitor for skin breakdown. Alleviate pressure to bony prominences and provide assistance with turning, repositioning, ROM and mobility as appropriate. Use of low air loss bed, wedges, TAPs,  mepilex's and wound care as needed. Continue to monitor. '     Problem: Fall Risk  Goal: Patient will remain free from falls  Outcome: Progressing  Note: Treaded socks and bed/strip alarm on, side rails up x 3. Call light within reach. Pt educated to call for assistance. Reinforce as needed. Continue to monitor.       The patient is Stable - Low risk of patient condition declining or worsening    Shift Goals: NPO for procedure   Clinical Goals: Monitor skin integrity  Patient Goals: \"I'm fine\"  Family Goals: MARIVEL    Progress made toward(s) clinical / shift goals: Patient placed on low air loss bed     Patient is not progressing towards the following goals: Confused      "

## 2023-10-19 NOTE — PROGRESS NOTES
Bedside report received from Adrien MOURA RN. Pt appears to be resting comfortably. Granger catheter draining. Afib on the monitor. On room air. Pt verbalizes understanding. Call light and belongings within reach. Bed locked and in lowest position. Alarm and fall precautions in place.

## 2023-10-19 NOTE — OR NURSING
Patient arrived to PACU from OR in stable condition.   Report received at 1156.   VSS and initial assessment complete. Pt resting comfortably. No s/s pain or nausea. Per anesthesia BP okay Map greater than 60.  1220 medicated per orders  1245 stable to return to floor status.      Report given to Buffy AYALA opportunity given for questions.   Patient discharged to T8 in stable condition, consistent with preoperative status.

## 2023-10-19 NOTE — OR NURSING
Spoke to GAGAN Crabtree. He informed this RN that Nina (POA #1) has been  since 2017. Please Bro for all questions/concerns.

## 2023-10-19 NOTE — OP REPORT
DATE OF OPERATION:  10/19/2023    PREOPERATIVE DIAGNOSIS:  Unstageable sacral decubitus ulcer     POSTOPERATIVE DIAGNOSIS: Stage IV sacral decubitus ulcer    PROCEDURE PERFORMED: Debridement of skin, subcutaneous tissue, and muscle 143 cm2 (11 cm x 13 cm)    SURGEON:    Amro Medrano M.D.    ASSISTANT:    BETTIE Trinh.    ANESTHESIOLOGIST:  Anam Page M.D.    ANESTHESIA:   General endotracheal anesthesia.    ASA CLASSIFICATION:  II.    INDICATIONS: The patient is a 88 year-old elderly man with a sacral decubitus ulcer. He is taken to the operating room for debridement.    The nature of the surgical procedure warranted additional skilled operative assistance from an Advanced Registered Nurse Practitioner (ARNP). The assistant was present during the entire operation. The surgical assistant performed the following: provided assistance with optimal surgical exposure of the operative field.    FINDINGS: 11 cm x 13 cm sacral decubitus ulcer extending down to the periosteum in several places. Non-viable and infected tissue debrided to healthy bleeding tissue.     WOUND CLASSIFICATION:  Class IV, Dirty or Infected.    SPECIMEN:     None.    ESTIMATED BLOOD LOSS:  50 mL.    PROCEDURE: Following informed consent, the patient was properly identified, taken to the operating room and placed in supine position where a general endotracheal anesthesia was administered. Intravenous antibiotics were not administered to this low risk patient. The patient arrived with a previously placed Granger catheter. Sequential compression devices were employed. The patient was placed in a prone position with careful attention to padding of the face, trunk, and support of the extremities. A safety retension strap was secured. Active patient warming devices were utilized. The operative site was widely prepped and draped into a sterile field.  A timeout was conducted with the full attention and participation of all operating room  personnel.      All necrotic, infected, and non-viable skin, subcutaneous tissue, and muscle in the wound was excised using electrocautery back to healthy bleeding tissue. After all non-viable tissue was excised the wound extended down to the periosteum in a few locations but the periosteum itself appeared viable. Hemostasis was achieved with electrocautery. The wound was irrigated with warm sterile saline and suctioned dry. The wound was packed with Dakin's soaked Kerlix gauze with an ABD placed overlying the wound.    The patient tolerated the procedure well, and there were no apparent complications. All sponge, needle, and instrument counts were correct on 2 separate occasions. The patient was was awakened, extubated, and transferred to  the post anesthesia care unit (PACU) in satisfactory condition.       ____________________________________     Amor Medrano M.D.    DD: 10/19/2023  12:08 PM

## 2023-10-19 NOTE — PROGRESS NOTES
PREOPERATIVE NOTE: I have seen and examined the patient today.  Critical physical examination findings, laboratory indices, and radiographic studies reviewed.  I recommend debridement of his sacral decubitus ulcer.    I also discussed the possible need for blood transfusion with the patient's decision maker, based on the patient's advanced directive he does not believe the patient would want transfusion as part of an effort to sustain his life but has no objection to receiving blood products or other derived medications as part of routine care.    The operative strategy was explained and reviewed. Alternatives discussed. Potential complications including, but not limited to: infection; bleeding; damage to adjacent structures; and anesthetic complications were discussed. Questions were elicited and answered to the patient's decision maker's (Bro Chiu) satisfaction.  Operative consent signed.        ____________________________________     Amor Medrano M.D.    DD: 10/19/2023  10:39 AM

## 2023-10-19 NOTE — ANESTHESIA PROCEDURE NOTES
Airway    Date/Time: 10/19/2023 11:03 AM    Performed by: Anam Page M.D.  Authorized by: Anam Page M.D.    Location:  OR  Urgency:  Elective  Indications for Airway Management:  Anesthesia      Spontaneous Ventilation: absent    Sedation Level:  Deep  Preoxygenated: Yes    Patient Position:  Sniffing  Mask Difficulty Assessment:  0 - not attempted  Final Airway Type:  Endotracheal airway  Final Endotracheal Airway:  ETT  Cuffed: Yes    Technique Used for Successful ETT Placement:  Direct laryngoscopy    Insertion Site:  Oral  Blade Type:  Nunez  Laryngoscope Blade/Videolaryngoscope Blade Size:  2  ETT Size (mm):  6.5  Measured from:  Teeth  ETT to Teeth (cm):  23  Placement Verified by: auscultation and capnometry    Cormack-Lehane Classification:  Grade IIa - partial view of glottis  Number of Attempts at Approach:  1

## 2023-10-19 NOTE — ANESTHESIA PREPROCEDURE EVALUATION
Case: [id number] Date/Time: 10/19/23 1020    Procedure: IRRIGATION AND DEBRIDEMENT, SACRUM    Location: [facility] OR 09 / SURGERY [facility]    Surgeons: Amor Medrano M.D.      Sacral wound.     Disoriented.     Per grandson, no problems with prior anesthetics.     ECHO wnl.     NPO.     Relevant Problems   CARDIAC   (positive) Atrial fibrillation (HCC)         (positive) Acute kidney injury (HCC)   (positive) Kidney stones       Physical Exam    Airway   Mallampati: II  TM distance: >3 FB  Neck ROM: full       Cardiovascular - normal exam  Rhythm: regular  Rate: normal  (-) murmur     Dental - normal exam      Very poor dentition  Facial Hair   Pulmonary - normal exam  Breath sounds clear to auscultation     Abdominal    Neurological - abnormal exam                 Anesthesia Plan    ASA 2       Plan - general       Airway plan will be ETT          Induction: intravenous    Postoperative Plan: Postoperative administration of opioids is intended.    Pertinent diagnostic labs and testing reviewed    Informed Consent:    Anesthetic plan and risks discussed with patient.    Use of blood products discussed with: patient whom consented to blood products.

## 2023-10-19 NOTE — ANESTHESIA TIME REPORT
Anesthesia Start and Stop Event Times     Date Time Event    10/19/2023 1017 Ready for Procedure     1056 Anesthesia Start     1202 Anesthesia Stop        Responsible Staff  10/19/23    Name Role Begin End    Anam Page M.D. Anesth 1056 1202        Overtime Reason:  no overtime (within assigned shift)    Comments:

## 2023-10-19 NOTE — PROGRESS NOTES
4 Eyes Skin Assessment Completed by LUCY Baer and LUCY Wagner.    Head WDL  Ears Redness and Blanching  Nose Redness and Blanching  Mouth WDL  Neck Bruising  Breast/Chest Redness and Bruising  Shoulder Blades Redness and Blanching  Spine Redness and Blanching  (R) Arm/Elbow/Hand Redness and Bruising  New arm redness and swelling, Dr. Conte notified   (L) Arm/Elbow/Hand Redness and Bruising  Abdomen WDL  Groin Redness, Non-Blanching, Excoriation, Bruising, and Scab  Scrotum/Coccyx/Buttocks Wound, dressing in place, patient arrived from procedure   (R) Leg Redness, Scab, Bruising, and Edema  (L) Leg Redness, Scab, Bruising, and Edema  (R) Heel/Foot/Toe Redness and Non-Blanching  (L) Heel/Foot/Toe Redness and Non-Blanching    Devices In Places Tele Box, Blood Pressure Cuff, Pulse Ox, Granger, and Oxy Mask    Interventions In Place Heel Mepilex, Sacral Mepilex, Heel Float Boots, TAP System, Pillows, Elbow Mepilex, Q2 Turns, Low Air Loss Mattress, Barrier Cream, and Heels Loaded W/Pillows    Possible Skin Injury Yes    Pictures Uploaded Into Epic Yes  Wound Consult Placed Yes  RN Wound Prevention Protocol Ordered Yes

## 2023-10-19 NOTE — ASSESSMENT & PLAN NOTE
10/19 Surgical debridement of sacral decubitus ulcer  10/20 Wound care to place wound vac.  No plan for surgical intervention at this time. ACS Willett will sign off. Please consult as needed.

## 2023-10-20 LAB
ALBUMIN SERPL BCP-MCNC: 1.7 G/DL (ref 3.2–4.9)
ALBUMIN/GLOB SERPL: 0.7 G/DL
ALP SERPL-CCNC: 130 U/L (ref 30–99)
ALT SERPL-CCNC: 22 U/L (ref 2–50)
ANION GAP SERPL CALC-SCNC: 8 MMOL/L (ref 7–16)
AST SERPL-CCNC: 24 U/L (ref 12–45)
BASOPHILS # BLD AUTO: 0.4 % (ref 0–1.8)
BASOPHILS # BLD: 0.02 K/UL (ref 0–0.12)
BILIRUB SERPL-MCNC: 0.3 MG/DL (ref 0.1–1.5)
BUN SERPL-MCNC: 23 MG/DL (ref 8–22)
CALCIUM ALBUM COR SERPL-MCNC: 9.5 MG/DL (ref 8.5–10.5)
CALCIUM SERPL-MCNC: 7.7 MG/DL (ref 8.5–10.5)
CHLORIDE SERPL-SCNC: 109 MMOL/L (ref 96–112)
CO2 SERPL-SCNC: 21 MMOL/L (ref 20–33)
CREAT SERPL-MCNC: 1.06 MG/DL (ref 0.5–1.4)
EOSINOPHIL # BLD AUTO: 0.04 K/UL (ref 0–0.51)
EOSINOPHIL NFR BLD: 0.8 % (ref 0–6.9)
ERYTHROCYTE [DISTWIDTH] IN BLOOD BY AUTOMATED COUNT: 62.4 FL (ref 35.9–50)
GFR SERPLBLD CREATININE-BSD FMLA CKD-EPI: 67 ML/MIN/1.73 M 2
GLOBULIN SER CALC-MCNC: 2.4 G/DL (ref 1.9–3.5)
GLUCOSE SERPL-MCNC: 108 MG/DL (ref 65–99)
HCT VFR BLD AUTO: 24.5 % (ref 42–52)
HGB BLD-MCNC: 8 G/DL (ref 14–18)
IMM GRANULOCYTES # BLD AUTO: 0.05 K/UL (ref 0–0.11)
IMM GRANULOCYTES NFR BLD AUTO: 1 % (ref 0–0.9)
LYMPHOCYTES # BLD AUTO: 1.75 K/UL (ref 1–4.8)
LYMPHOCYTES NFR BLD: 35.1 % (ref 22–41)
MCH RBC QN AUTO: 29.3 PG (ref 27–33)
MCHC RBC AUTO-ENTMCNC: 32.7 G/DL (ref 32.3–36.5)
MCV RBC AUTO: 89.7 FL (ref 81.4–97.8)
MONOCYTES # BLD AUTO: 0.31 K/UL (ref 0–0.85)
MONOCYTES NFR BLD AUTO: 6.2 % (ref 0–13.4)
NEUTROPHILS # BLD AUTO: 2.81 K/UL (ref 1.82–7.42)
NEUTROPHILS NFR BLD: 56.5 % (ref 44–72)
NRBC # BLD AUTO: 0 K/UL
NRBC BLD-RTO: 0 /100 WBC (ref 0–0.2)
PLATELET # BLD AUTO: 52 K/UL (ref 164–446)
PLATELETS.RETICULATED NFR BLD AUTO: 3.5 % (ref 0.6–13.1)
PMV BLD AUTO: 10.9 FL (ref 9–12.9)
POTASSIUM SERPL-SCNC: 3.8 MMOL/L (ref 3.6–5.5)
PROT SERPL-MCNC: 4.1 G/DL (ref 6–8.2)
RBC # BLD AUTO: 2.73 M/UL (ref 4.7–6.1)
SODIUM SERPL-SCNC: 138 MMOL/L (ref 135–145)
WBC # BLD AUTO: 5 K/UL (ref 4.8–10.8)

## 2023-10-20 PROCEDURE — 770001 HCHG ROOM/CARE - MED/SURG/GYN PRIV*

## 2023-10-20 PROCEDURE — 700102 HCHG RX REV CODE 250 W/ 637 OVERRIDE(OP): Performed by: BEHAVIOR ANALYST

## 2023-10-20 PROCEDURE — 85025 COMPLETE CBC W/AUTO DIFF WBC: CPT

## 2023-10-20 PROCEDURE — 85055 RETICULATED PLATELET ASSAY: CPT

## 2023-10-20 PROCEDURE — 36415 COLL VENOUS BLD VENIPUNCTURE: CPT

## 2023-10-20 PROCEDURE — 99231 SBSQ HOSP IP/OBS SF/LOW 25: CPT | Performed by: REGISTERED NURSE

## 2023-10-20 PROCEDURE — 302098 PASTE RING (FLAT): Performed by: FAMILY MEDICINE

## 2023-10-20 PROCEDURE — 306591 TRAY SUTURE REMOVAL DISP: Performed by: FAMILY MEDICINE

## 2023-10-20 PROCEDURE — 99232 SBSQ HOSP IP/OBS MODERATE 35: CPT | Mod: GC | Performed by: FAMILY MEDICINE

## 2023-10-20 PROCEDURE — 700105 HCHG RX REV CODE 258

## 2023-10-20 PROCEDURE — A9270 NON-COVERED ITEM OR SERVICE: HCPCS | Performed by: BEHAVIOR ANALYST

## 2023-10-20 PROCEDURE — 97606 NEG PRS WND THER DME>50 SQCM: CPT

## 2023-10-20 PROCEDURE — 700102 HCHG RX REV CODE 250 W/ 637 OVERRIDE(OP)

## 2023-10-20 PROCEDURE — 80053 COMPREHEN METABOLIC PANEL: CPT

## 2023-10-20 PROCEDURE — A9270 NON-COVERED ITEM OR SERVICE: HCPCS

## 2023-10-20 RX ORDER — SODIUM HYPOCHLORITE 1.25 MG/ML
SOLUTION TOPICAL PRN
Status: DISCONTINUED | OUTPATIENT
Start: 2023-10-20 | End: 2023-10-24 | Stop reason: HOSPADM

## 2023-10-20 RX ADMIN — TAMSULOSIN HYDROCHLORIDE 0.4 MG: 0.4 CAPSULE ORAL at 17:59

## 2023-10-20 RX ADMIN — MINOCYCLINE HYDROCHLORIDE 100 MG: 50 CAPSULE ORAL at 05:00

## 2023-10-20 RX ADMIN — MICONAZOLE NITRATE: 20 CREAM TOPICAL at 05:04

## 2023-10-20 RX ADMIN — OMEPRAZOLE 20 MG: 20 CAPSULE, DELAYED RELEASE ORAL at 05:00

## 2023-10-20 RX ADMIN — SODIUM HYPOCHLORITE 200 ML: 1.25 SOLUTION TOPICAL at 05:04

## 2023-10-20 RX ADMIN — POTASSIUM CHLORIDE 10 MEQ: 1500 TABLET, EXTENDED RELEASE ORAL at 05:00

## 2023-10-20 RX ADMIN — SENNOSIDES AND DOCUSATE SODIUM 1 TABLET: 50; 8.6 TABLET ORAL at 20:38

## 2023-10-20 RX ADMIN — MINOCYCLINE HYDROCHLORIDE 100 MG: 50 CAPSULE ORAL at 18:00

## 2023-10-20 RX ADMIN — Medication 3 MG: at 20:39

## 2023-10-20 RX ADMIN — MEMANTINE HYDROCHLORIDE 5 MG: 10 TABLET ORAL at 05:06

## 2023-10-20 RX ADMIN — SODIUM CHLORIDE, POTASSIUM CHLORIDE, SODIUM LACTATE AND CALCIUM CHLORIDE: 600; 310; 30; 20 INJECTION, SOLUTION INTRAVENOUS at 15:26

## 2023-10-20 RX ADMIN — SENNOSIDES AND DOCUSATE SODIUM 1 TABLET: 50; 8.6 TABLET ORAL at 08:22

## 2023-10-20 RX ADMIN — SODIUM CHLORIDE, POTASSIUM CHLORIDE, SODIUM LACTATE AND CALCIUM CHLORIDE: 600; 310; 30; 20 INJECTION, SOLUTION INTRAVENOUS at 05:11

## 2023-10-20 ASSESSMENT — COGNITIVE AND FUNCTIONAL STATUS - GENERAL
TOILETING: TOTAL
MOVING TO AND FROM BED TO CHAIR: A LOT
MOBILITY SCORE: 9
TURNING FROM BACK TO SIDE WHILE IN FLAT BAD: A LOT
CLIMB 3 TO 5 STEPS WITH RAILING: TOTAL
STANDING UP FROM CHAIR USING ARMS: TOTAL
PERSONAL GROOMING: A LOT
HELP NEEDED FOR BATHING: TOTAL
DRESSING REGULAR UPPER BODY CLOTHING: A LOT
SUGGESTED CMS G CODE MODIFIER DAILY ACTIVITY: CL
DAILY ACTIVITIY SCORE: 11
EATING MEALS: A LITTLE
MOVING FROM LYING ON BACK TO SITTING ON SIDE OF FLAT BED: A LOT
SUGGESTED CMS G CODE MODIFIER MOBILITY: CM
DRESSING REGULAR LOWER BODY CLOTHING: A LOT
WALKING IN HOSPITAL ROOM: TOTAL

## 2023-10-20 ASSESSMENT — FIBROSIS 4 INDEX: FIB4 SCORE: 7.65

## 2023-10-20 ASSESSMENT — PAIN DESCRIPTION - PAIN TYPE: TYPE: ACUTE PAIN

## 2023-10-20 NOTE — PROGRESS NOTES
Monitor Summary  Rhythm: Atrial Fibrillation - SR  Rate: 65-70  Ectopy: PVCs  .- / .09 / .-

## 2023-10-20 NOTE — DISCHARGE PLANNING
Case Management Discharge Planning    Admission Date: 10/17/2023  GMLOS: 2.9  ALOS: 3    6-Clicks ADL Score: 13  6-Clicks Mobility Score: 10  PT and/or OT Eval ordered: No  Post-acute Referrals Ordered: No  Post-acute Choice Obtained: No  Has referral(s) been sent to post-acute provider:  No      Anticipated Discharge Dispo: Discharge Disposition: D/T to SNF with Medicare cert in anticipation of skilled care (03)    DME Needed: No    Action(s) Taken: Updated Provider/Nurse on Discharge Plan    Escalations Completed: Pending Discharge Destination    Medically Clear: No    Next Steps: Per IDT rounds, pt is post-op day #1 wound irrigation, wound RN to apply wound vac, no plan for surgical intervention currently. REANNA received voalte from Dr. Lucy Conte to follow up on pt's disposition for post-acute care vs returning to group home. REANNA called and spoke with pt's step-grandson (MPOA) [phone] to discuss pt's disposition and recommendation for SNF. Bro provided this writer with two patient identifier's such as: pt's name and . Per Bro, patient has been hospitalized at [facility] in the past and Bro states, “that is he reason he is at the hospital and developed a bedsore”. Bro discussed with this writer concerns he is having by following pt's wishes documented on AD. Bro is open and requesting information on palliative services vs hospice. REANNA informed Bro this writer will discuss with provider for a consult. REANNA sent a voalte to Dr. Lucy Conte, for a Palliative vs Hospice consult for a team member to speak with Bro. REANNA to remain available for discharge planning.     Barriers to Discharge: Medical clearance and Pending Placement    Is the patient up for discharge tomorrow: No

## 2023-10-20 NOTE — CARE PLAN
The patient is Watcher - Medium risk of patient condition declining or worsening    Shift Goals  Clinical Goals: q2 turns, skin integrity  Patient Goals: comfort  Family Goals: n/a    Progress made toward(s) clinical / shift goals:    Problem: Skin Integrity  Goal: Skin integrity is maintained or improved  10/20/2023 1459 by Kaya Sheppard R.N.  Outcome: Progressing  Note: Pt is on low air loss bed, participating in q2 turns, Pt has heel mepilex on with heel float boots. Wound care done qshift and prn  10/20/2023 1456 by Kaya Sheppard R.N.  Outcome: Progressing  Note: Pt is on low air loss bed, participating in Q2 turns, heel mepilex in place with float boots on     Problem: Pain - Standard  Goal: Alleviation of pain or a reduction in pain to the patient’s comfort goal  10/20/2023 1459 by Kaya Sheppard R.N.  Outcome: Progressing  Note: Assess and monitor for pain. Provide pharmacological and non-pharmacological interventions as appropriate. Re-evaluate and continue to monitor.         10/20/2023 1456 by Kaya Sheppard R.N.  Outcome: Progressing  Note: Assess and monitor for pain. Provide pharmacological and non-pharmacological interventions as appropriate. Re-evaluate and continue to monitor.          Patient is not progressing towards the following goals:  Problem: Nutrition  Goal: Patient's nutritional and fluid intake will be adequate or improve  Outcome: Not Progressing  Note: Pt consuming small portion of food and has lack of appetite. Boost to be added for additional nutrition tomorrow         Problem: Nutrition  Goal: Patient's nutritional and fluid intake will be adequate or improve  Outcome: Not Progressing  Note: Pt consuming small portion of food and has lack of appetite. Boost to be added for additional nutrition tomorrow

## 2023-10-20 NOTE — PROGRESS NOTES
"  DATE: 10/20/2023    Post Operative Day  1 wound irrigation and debridement. Sacral decubitus.     INTERVAL EVENTS:  Doing well.  Wound RN to apply wound vac.  No surgical interventions planned.  ACS Brennon will sign off.     PHYSICAL EXAMINATION:  Vital Signs: BP 97/54   Pulse 71   Temp 36.1 °C (97 °F) (Temporal)   Resp 16   Ht 1.778 m (5' 10\")   Wt 88.2 kg (194 lb 7.1 oz)   SpO2 94%     Patient sitting up. Alert and awake.  Sacral decubitus with dressing in place.    LABORATORY VALUES:  Recent Labs     10/18/23  0147 10/19/23  0212 10/20/23  0920   WBC 9.4 5.6 5.0   RBC 2.79* 2.86* 2.73*   HEMOGLOBIN 8.3* 8.4* 8.0*   HEMATOCRIT 25.3* 25.7* 24.5*   MCV 90.7 89.9 89.7   MCH 29.7 29.4 29.3   MCHC 32.8 32.7 32.7   RDW 63.4* 63.3* 62.4*   PLATELETCT 86* 67* 52*   MPV 11.6 10.7 10.9     Recent Labs     10/18/23  0147 10/19/23  0212 10/20/23  0920   SODIUM 139 141 138   POTASSIUM 3.6 3.5* 3.8   CHLORIDE 109 111 109   CO2 19* 22 21   GLUCOSE 72 84 108*   BUN 24* 22 23*   CREATININE 1.13 1.10 1.06   CALCIUM 7.7* 7.9* 7.7*     Recent Labs     10/18/23  0147 10/19/23  0212 10/20/23  0920   ASTSGOT 28 24 24   ALTSGPT 16 17 22   TBILIRUBIN 0.5 0.3 0.3   ALKPHOSPHAT 135* 140* 130*   GLOBULIN see below see below 2.4            IMAGING:  DX-CHEST-PORTABLE (1 VIEW)   Final Result      No radiographic evidence of acute cardiopulmonary disease.          ASSESSMENT AND PLAN:  Sacral decubitus ulcer  Assessment & Plan  10/19 Surgical debridement of sacral decubitus ulcer  10/20 Wound care to place wound vac.  No plan for surgical intervention at this time. ACS Brennon will sign off. Please consult as needed.          ___________________________________     DAVID Trinh    DD: 10/20/2023  11:33 AM   "

## 2023-10-20 NOTE — PROGRESS NOTES
Bedside report received from night RN, pt care assumed, assessment completed. Pt is A&O1, pain 0/10, Afib on the monitor. Updated on POC, questions answered. Bed in lowest, locked position, treaded socks on, call light and belongings within reach.

## 2023-10-20 NOTE — DIETARY
Nutrition Update:    Day 3 of admit.  Trev Carl is a 88 y.o. male with admitting DX of Complicated UTI.  Patient being followed to optimize nutrition.    Current Diet: Cardiac, Rick BID    Reached out to RN via Voalte to confirm pt is drinking Rick supplements, which RN stated he is.     Per ADLs, pt PO 0% x 1 and <25% x 1. To add Boost supplement to meal trays to promote further kcal and protein intake.    Problem: Nutritional:  Goal: Achieve adequate nutritional intake  Description: Patient will consume >50% of meals and 100% of Rick  Outcome: progressing slower than expected    Recommendations/Plan:  Order Boost Plus TID.  Monitor PO intake trends.  Encourage intake of >50% of meals and Boost and 100% of Rick  Document intake of all meals and supplements as % taken in ADL's to provide interdisciplinary communication across all shifts.   Monitor weight.  Nutrition rep will continue to see patient for ongoing meal and snack preferences.       RD following

## 2023-10-20 NOTE — PROGRESS NOTES
"[facility] FAMILY MEDICINE PROGRESS NOTE     Attending:   Sixto Quinteros M.d.    Resident:   Samantha Will MD    PATIENT:   Trev Carl; [id number]; 1935    ID:   88 y.o. male admitted for possible UTI, sacral wound.    SUBJECTIVE:   No acute events overnight. Patient had sacral wound debridement on 10/19/23. Patient this morning was resting comfortably in bed, he woke to his name. Patient was alert to person however responded with \"I don't know\" to year, place and situation. He denies having any pain.     OBJECTIVE:  Vitals:    10/20/23 0400 10/20/23 0720 10/20/23 1125 10/20/23 1520   BP: 99/51 97/51 97/54 110/58   Pulse: 75 72 71 72   Resp: 16 18 16 18   Temp: 36.4 °C (97.5 °F) 36.3 °C (97.3 °F) 36.1 °C (97 °F) 36.3 °C (97.3 °F)   TempSrc: Temporal Temporal Temporal Temporal   SpO2: 97% 95% 94% 94%   Weight: 88.2 kg (194 lb 7.1 oz)      Height:             Intake/Output Summary (Last 24 hours) at 10/20/2023 1620  Last data filed at 10/19/2023 2148  Gross per 24 hour   Intake 100 ml   Output 800 ml   Net -700 ml           PHYSICAL EXAM:  General: No acute distress, afebrile, resting comfortably, conversational   HEENT: NC/AT. EOMI.   Cardiovascular: regularly irregular without murmurs, rubs, heaves. Normal capillary refill   Respiratory: CTAB, no tachypnea or retractions   Abdomen: normal bowel sounds, soft, nontender, nondistended, no masses, no organomegaly   EXT: Patient has a very large and deep chronic sacral wound, no drainage or blood or signs of infection  Skin: No erythema/lesions   Neuro: Non-focal, alert and orientated x1    LABS:  Recent Labs     10/18/23  0147 10/19/23  0212 10/20/23  0920   WBC 9.4 5.6 5.0   RBC 2.79* 2.86* 2.73*   HEMOGLOBIN 8.3* 8.4* 8.0*   HEMATOCRIT 25.3* 25.7* 24.5*   MCV 90.7 89.9 89.7   MCH 29.7 29.4 29.3   RDW 63.4* 63.3* 62.4*   PLATELETCT 86* 67* 52*   MPV 11.6 10.7 10.9   NEUTSPOLYS 67.60 56.70 56.50   LYMPHOCYTES 23.70 29.40 35.10   MONOCYTES 5.80 7.50 6.20   EOSINOPHILS " "1.20 4.80 0.80   BASOPHILS 0.30 0.70 0.40       Recent Labs     10/18/23  0147 10/19/23  0212 10/20/23  0920   SODIUM 139 141 138   POTASSIUM 3.6 3.5* 3.8   CHLORIDE 109 111 109   CO2 19* 22 21   BUN 24* 22 23*   CREATININE 1.13 1.10 1.06   CALCIUM 7.7* 7.9* 7.7*   MAGNESIUM 1.6  --   --    ALBUMIN 1.4* 1.4* 1.7*       Estimated GFR/CRCL = Estimated Creatinine Clearance: 53.9 mL/min (by C-G formula based on SCr of 1.06 mg/dL).  Recent Labs     10/18/23  0147 10/19/23  0212 10/20/23  0920   GLUCOSE 72 84 108*       Recent Labs     10/18/23  0147 10/19/23  0212 10/20/23  0920   ASTSGOT 28 24 24   ALTSGPT 16 17 22   TBILIRUBIN 0.5 0.3 0.3   ALKPHOSPHAT 135* 140* 130*   GLOBULIN see below see below 2.4               No results for input(s): \"INR\", \"APTT\", \"FIBRINOGEN\" in the last 72 hours.    Invalid input(s): \"DIMER\"      IMAGING:  DX-CHEST-PORTABLE (1 VIEW)   Final Result      No radiographic evidence of acute cardiopulmonary disease.          MEDS:  Current Facility-Administered Medications   Medication Last Admin    dakins 0.125% (1/4 strength) topical soln 200 mL at 10/20/23 0504    miconazole (Micotin) 2 % cream Given at 10/20/23 0504    minocycline (Minocin) capsule 100 mg 100 mg at 10/20/23 0500    polyethylene glycol/lytes (Miralax) PACKET 1 Packet      And    magnesium hydroxide (Milk Of Magnesia) suspension 30 mL      And    bisacodyl (Dulcolax) suppository 10 mg      lactated ringers infusion New Bag at 10/20/23 1526    labetalol (Normodyne/Trandate) injection 10 mg      acetaminophen (Tylenol) tablet 650 mg 650 mg at 10/18/23 1215    melatonin tablet 3 mg 3 mg at 10/19/23 2055    memantine (Namenda) tablet 5 mg 5 mg at 10/20/23 0506    omeprazole (PriLOSEC) capsule 20 mg 20 mg at 10/20/23 0500    potassium chloride SA (Kdur) tablet 10 mEq 10 mEq at 10/20/23 0500    senna-docusate (Pericolace Or Senokot S) 8.6-50 MG per tablet 1 Tablet 1 Tablet at 10/20/23 0822    tamsulosin (Flomax) capsule 0.4 mg 0.4 mg at " 10/19/23 3034       ASSESSMENT/PLAN:  88-year-old male past medical history of chronic sacral wound and potential UTI given AMS.    * Complicated UTI (urinary tract infection)- (present on admission)  Assessment & Plan  Patient presents with altered mental status and UA positive for UTI.  No leukocytosis, patient does not appear septic.  Lactic acid was elevated at 2.5, repeat was 2.7.  He does have indwelling Granger catheter and frequent UTIs.  Last month, patient had Klebsiella ESBL UTI and bacteremia.  Lactic stable, HD stable, no leukocytosis, Urine culture grew klebsiella similar to prior hospitalization.    -IV fluids  -spoke with id pharm and agree to d/c abx at this time, as uti unlikely at this time     Sacral decubitus ulcer  Assessment & Plan  Patient has history of sacral decubitus ulcer.  He resides in skilled nursing facility.  Wound large and deep, altho does not have infectious picture at this time  -agree w/ ID pharm discussion on d/c abx at this time and restarting if s/s of infection arise  -s/p OR for debridement, f/u reccs  -srx consulted    AMS (altered mental status)  Assessment & Plan  Patient has altered mental status, most likely due to UTI.  We will treat UTI with antibiotics and assess for improvement.    Atrial fibrillation (HCC)- (present on admission)  Assessment & Plan  Patient has history of A-fib, anticoagulated.  We will continue his home Xarelto.  -Continue home Xarelto  -Telemetry monitoring    Anemia- (present on admission)  Assessment & Plan  Normocytic.  Patient has chronic anemia, currently at his baseline at 10.  Likely anemia of chronic disease or combined picture.  -Iron/TIBC        Code Status: dnr/dni    Dispo: Medically cleared pending wound vac per surgery and placement

## 2023-10-20 NOTE — PROGRESS NOTES
[facility] PROGRESS NOTE     Attending:   Sixto Quinteros M.d.    Resident:   Lucy Conte MD    PATIENT:   Trev Carl; [id number]; 1935    ID:   88 y.o. male admitted UTI, sacral wound.    SUBJECTIVE:   No acute events overnight, patient A/O x3-4; OR today for sacral wound debridement.    OBJECTIVE:  Vitals:    10/19/23 1900 10/19/23 2000 10/20/23 0000 10/20/23 0400   BP: 98/47 102/61 102/60 99/51   Pulse: 80 83 71 75   Resp: 16 16 16 16   Temp: 36.3 °C (97.3 °F) 36.4 °C (97.5 °F) 36.3 °C (97.3 °F) 36.4 °C (97.5 °F)   TempSrc: Temporal Temporal Temporal Temporal   SpO2: 95% 95% 95% 97%   Weight:    88.2 kg (194 lb 7.1 oz)   Height:           Intake/Output Summary (Last 24 hours) at 10/18/2023 1626  Last data filed at 10/18/2023 1200  Gross per 24 hour   Intake 97.28 ml   Output 400 ml   Net -302.72 ml       PHYSICAL EXAM:  General: No acute distress, afebrile, resting comfortably, conversational   HEENT: NC/AT. EOMI.   Cardiovascular: regularly irregular without murmurs, rubs, heaves. Normal capillary refill   Respiratory: CTAB, no tachypnea or retractions   Abdomen: normal bowel sounds, soft, nontender, nondistended, no masses, no organomegaly   EXT: Patient has a very large and deep chronic sacral wound, no drainage or blodd or signs of infection  Skin: No erythema/lesions   Neuro: Non-focal, alert and orientated     LABS:  Recent Labs     10/17/23  1413 10/18/23  0147 10/19/23  0212   WBC 7.7 9.4 5.6   RBC 3.47* 2.79* 2.86*   HEMOGLOBIN 10.3* 8.3* 8.4*   HEMATOCRIT 31.4* 25.3* 25.7*   MCV 90.5 90.7 89.9   MCH 29.7 29.7 29.4   RDW 63.5* 63.4* 63.3*   PLATELETCT 88* 86* 67*   MPV 11.5 11.6 10.7   NEUTSPOLYS 66.10 67.60 56.70   LYMPHOCYTES 24.00 23.70 29.40   MONOCYTES 6.60 5.80 7.50   EOSINOPHILS 0.80 1.20 4.80   BASOPHILS 0.00 0.30 0.70   RBCMORPHOLO Present  --   --      Recent Labs     10/17/23  1413 10/18/23  0147 10/19/23  0212   SODIUM 139 139 141   POTASSIUM 3.8 3.6 3.5*   CHLORIDE 106  "109 111   CO2 22 19* 22   BUN 25* 24* 22   CREATININE 1.08 1.13 1.10   CALCIUM 8.3* 7.7* 7.9*   MAGNESIUM  --  1.6  --    ALBUMIN 2.0* 1.4* 1.4*     Estimated GFR/CRCL = Estimated Creatinine Clearance: 51.9 mL/min (by C-G formula based on SCr of 1.1 mg/dL).  Recent Labs     10/17/23  1413 10/18/23  0147 10/19/23  0212   GLUCOSE 89 72 84     Recent Labs     10/17/23  1413 10/18/23  0147 10/19/23  0212   ASTSGOT 23 28 24   ALTSGPT 20 16 17   TBILIRUBIN 0.7 0.5 0.3   ALKPHOSPHAT 173* 135* 140*   GLOBULIN 3.2 see below see below             No results for input(s): \"INR\", \"APTT\", \"FIBRINOGEN\" in the last 72 hours.    Invalid input(s): \"DIMER\"      IMAGING:  DX-CHEST-PORTABLE (1 VIEW)   Final Result      No radiographic evidence of acute cardiopulmonary disease.          MEDS:  Current Facility-Administered Medications   Medication Last Admin    dakins 0.125% (1/4 strength) topical soln 200 mL at 10/20/23 0504    miconazole (Micotin) 2 % cream Given at 10/20/23 0504    minocycline (Minocin) capsule 100 mg 100 mg at 10/20/23 0500    polyethylene glycol/lytes (Miralax) PACKET 1 Packet      And    magnesium hydroxide (Milk Of Magnesia) suspension 30 mL      And    bisacodyl (Dulcolax) suppository 10 mg      lactated ringers infusion New Bag at 10/20/23 0511    labetalol (Normodyne/Trandate) injection 10 mg      acetaminophen (Tylenol) tablet 650 mg 650 mg at 10/18/23 1215    melatonin tablet 3 mg 3 mg at 10/19/23 2055    memantine (Namenda) tablet 5 mg 5 mg at 10/20/23 0506    omeprazole (PriLOSEC) capsule 20 mg 20 mg at 10/20/23 0500    potassium chloride SA (Kdur) tablet 10 mEq 10 mEq at 10/20/23 0500    senna-docusate (Pericolace Or Senokot S) 8.6-50 MG per tablet 1 Tablet 1 Tablet at 10/18/23 1801    tamsulosin (Flomax) capsule 0.4 mg 0.4 mg at 10/19/23 1711       ASSESSMENT/PLAN:  88-year-old male past medical history of chronic sacral wound and potential UTI given AMS.    Pt back in room post op today for sacral " debridement. Started pt back on minocycline and de-escalated abx after discussion with ID pharm as source of infection unlikely urine at this time, sacral wound also stable from infectious stand point. Will monitor post op. Will work with CM to place pt.     * Complicated UTI (urinary tract infection)- (present on admission)  Assessment & Plan  Patient presents with altered mental status and UA positive for UTI.  No leukocytosis, patient does not appear septic.  Lactic acid was elevated at 2.5, repeat was 2.7.  He does have indwelling Granger catheter and frequent UTIs.  Last month, patient had Klebsiella ESBL UTI and bacteremia.  Lactic stable, HD stable, no leukocytosis, Urine culture grew klebsiella similar to prior hospitalization.    -IV fluids  -spoke with id pharm and agree to d/c abx at this time, as uti unlikely at this time     Sacral decubitus ulcer  Assessment & Plan  Patient has history of sacral decubitus ulcer.  He resides in skilled nursing facility.  Wound large and deep, altho does not have infectious picture at this time  -agree w/ ID pharm discussion on d/c abx at this time and restarting if s/s of infection arise  -s/p OR for debridement, f/u reccs  -srx consulted    AMS (altered mental status)  Assessment & Plan  Patient has altered mental status, most likely due to UTI.  We will treat UTI with antibiotics and assess for improvement.    Atrial fibrillation (HCC)- (present on admission)  Assessment & Plan  Patient has history of A-fib, anticoagulated.  We will continue his home Xarelto.  -Continue home Xarelto  -Telemetry monitoring    Anemia- (present on admission)  Assessment & Plan  Normocytic.  Patient has chronic anemia, currently at his baseline at 10.  Likely anemia of chronic disease or combined picture.  -Iron/TIBC        Code Status: dnr/dni    Dispo: Maintain inpatient status for further medical evaluation

## 2023-10-20 NOTE — DOCUMENTATION QUERY
[facility]                                                                       Query Response Note      PATIENT:               TOOTIE DOWNING  ACCT #:                  [id number]  MRN:                     [id number]  :                      1935  ADMIT DATE:       10/17/2023 1:42 PM  DISCH DATE:          RESPONDING  PROVIDER #:        [id number]           QUERY TEXT:    Please clarify in documentation the relationship, if any, between UTI and graham catheter and/or ureteral stents    The patient's Clinical Indicators include:  - Findings:  H&P and subsequent progress notes:  complicated UTI,  He does have indwelling Graham catheter and frequent UTIs.  Last month, patient  had Klebsiella ESBL UTI and bacteremia.    - ED provider note:  88-year-old  debilitated woman was recently hospitalized for Klebsiella pneumonia and ESBL bacteremia as well as urosepsis.  He has ureteral stents in place    - abdominal X ray :  Bilateral ureteral stents are in place and appear appropriately positioned.  Nonspecific bowel gas pattern.    - Treatments:  antibiotics, cultures, change graham catheter     - Risk factors:  UTI, chronic urinary catheter, frequent UTI's, ureteral stents     Thank You,  Charley Wong RN  Clinical Documentation   [email or web address]  Connect via [employer]  Options provided:   -- UTI is due to or associated with urinary catheter present on admission and/or ureteral stents   -- UTI is due to or associated with urinary catheter   -- UTI is not due to or associated with urinary catheter and/or ureteral stents   -- Other explanation, Please specify other explanation   -- Unable to determine      Query created by: Charley Wong on 10/19/2023 4:30 PM    RESPONSE TEXT:    UTI is due to or associated with urinary catheter present on admission and/or ureteral stents       QUERY TEXT:    Altered mental status is  documented in the Medical Record. Per documentation Altered Mental Status is likely due to UTI.    Can a more specific diagnosis be provided?    The patient's Clinical Indicators include:  - Findings:  H&P and subsequent progress notes:  altered mental status, most likely due to UTI    - Treatments:  UA, change graham catheter, antibiotics, cultures     - Risk factors:  chronic graham catheter, altered mental status, sacral pressure ulcer, UTI    Thank You,  Charley Wong RN  Clinical Documentation   [email or web address]  Connect via [email or web address]  Options provided:   -- Acute metabolic encephalopathy   -- Acute encephalopathy due to other medical condition, (please specify other medical condition)   -- Delirium due to general medical condition   -- Delirium due to multiple etiologies   -- Other explanation, (please specify other explanation)   -- Unable to determine      Query created by: Charley Wong on 10/19/2023 4:35 PM    RESPONSE TEXT:    acute encephalopathy due to other medical condition - UTI          Electronically signed by:  MARTHA NEWMAN MD 10/19/2023 6:01 PM

## 2023-10-20 NOTE — WOUND TEAM
[facility]  Inpatient Services  Wound and Skin Care Follow-up    Admission Date: 10/17/2023     Last order of IP CONSULT TO WOUND CARE was found on 10/17/2023 from Hospital Encounter on 10/17/2023     HPI, PMH, SH: Reviewed    Past Surgical History:   Procedure Laterality Date    IRRIGATION & DEBRIDEMENT GENERAL N/A 10/19/2023    Procedure: IRRIGATION AND DEBRIDEMENT, SACRUM;  Surgeon: Amor Medrano M.D.;  Location: SURGERY [facility];  Service: Trauma     Social History     Tobacco Use    Smoking status: Never    Smokeless tobacco: Never   Substance Use Topics    Alcohol use: Not on file     Chief Complaint   Patient presents with    ALOC    Wound Infection     Diagnosis: Complicated UTI (urinary tract infection) [N39.0]    Unit where seen by Wound Team: T829/00     WOUND FOLLOW UP RELATED TO:  VAC placement sacrum        WOUND TEAM PLAN OF CARE - Frequency of Follow-up:   Nursing to follow dressing orders written for wound care. Contact wound team if area fails to progress, deteriorates or with any questions/concerns if something comes up before next scheduled follow up (See below as to whether wound is following and frequency of wound follow up)  Dressing changes by wound team:                   NPWT change 3 times weekly -      WOUND HISTORY:       88 y.o. male admitted from [facility] for AMS. Patient has a chronic graham. Was previously seen by wound team on 9/21 and noted to have a POA unstageable pressure injury to sacrum, although wound is much worse now when comparing clinical media images. Patient with spiraled Hydrofera blue dressing in place at time of initial assessment.       WOUND ASSESSMENT/LDA    Wound 09/20/23 Pressure Injury Sacrum POA unstageable -->10/19 I&D with Dr. Medrano (Active)   Date First Assessed/Time First Assessed: 09/20/23 0000   Present on Original Admission: Yes  Hand Hygiene Completed: Yes  Primary Wound Type: Pressure Injury  Location: Sacrum  Wound Description  (Comments): POA unstageable -->10/19 I&D with Dr. Medrano      Assessments 10/20/2023  4:00 PM   Wound Image      Site Assessment Red;Yellow;Slough   Periwound Assessment Fragile;Satellite lesions;Purple   Margins Defined edges;Unattached edges   Closure Secondary intention   Drainage Amount Moderate   Drainage Description Serosanguineous   Treatments Cleansed;Site care   Offloading/DME Heel float boot   Wound Cleansing Approved Wound Cleanser   Periwound Protectant No-sting Skin Prep;Paste Ring;Hydrocolloid   Dressing Status Clean;Dry;Intact   Dressing Changed Changed   Dressing Cleansing/Solutions Normal Saline   Dressing Options Wound Vac;Offloading Dressing - Sacral   Dressing Change/Treatment Frequency Monday, Wednesday, Friday, and As Needed   NEXT Dressing Change/Treatment Date 10/23/23   NEXT Weekly Photo (Inpatient Only) 10/27/23   Wound Team Following 3x Weekly   Pressure Injury Stage Stage 4   Wound Length (cm) 10.5 cm   Wound Width (cm) 7.3 cm   Wound Depth (cm) 5.7 cm   Wound Surface Area (cm^2) 76.65 cm^2   Wound Volume (cm^3) 436.905 cm^3   Wound Healing % -106   Undermining (cm) 4.3 cm   Undermining of Wound, 1st Location From 11 o'clock;To 6 o'clock   Shape irregular   Wound Odor None   WOUND NURSE ONLY - Time Spent with Patient (mins) 60       Negative Pressure Wound Therapy 10/20/23 Surgical;Pressure injury: stage 4 Sacrum (Active)   Placement Date/Time: 10/20/23 1647   Wound Type: Surgical;Pressure injury: stage 4  Location: Sacrum      Assessments 10/20/2023  4:00 PM   Wound Image      Vacuum Serial Number [id number]   NPWT Pump Mode / Pressure Setting Ulta;Intermittent;125 mmHg   Dressing Type Black Foam (Veraflo)   Number of Foam Pieces Used 2   Canister Changed No   NEXT Dressing Change/Treatment Date 10/23/23   VAC VeraFlo Irrigant Normal Saline   VAC VeraFlo Soak Time (mins) 10   VAC VeraFlo Instill Volume (ml) 36   VAC VeraFlo - Therapy Time (hrs) 2   VAC VeraFlo Pressure (mm/Hg)  Intermittent;125 mmHg   WOUND NURSE ONLY - Time Spent with Patient (mins) 60        Vascular:    FIONA:   No results found.    Lab Values:    Lab Results   Component Value Date/Time    WBC 5.0 10/20/2023 09:20 AM    RBC 2.73 (L) 10/20/2023 09:20 AM    HEMOGLOBIN 8.0 (L) 10/20/2023 09:20 AM    HEMATOCRIT 24.5 (L) 10/20/2023 09:20 AM    SEDRATEWES 59 (H) 10/17/2023 02:13 PM         Culture Results show:  No results found for this or any previous visit (from the past 720 hour(s)).    Pain Level/Medicated:  None, Tolerated without pain medication       INTERVENTIONS BY WOUND TEAM:  Chart and images reviewed. Discussed with bedside RN. All areas of concern (based on picture review, LDA review and discussion with bedside RN) have been thoroughly assessed. Documentation of areas based on significant findings. This RN in to assess patient. Performed standard wound care which includes appropriate positioning, dressing removal and non-selective debridement. Pictures and measurements obtained weekly if/when required.    Wound:  Sacrum  Preparation for Dressing removal: Dressing soaked with wound cleanser  Cleansed/Non-selectively Debrided with:  Wound cleanser and Gauze  Sarah wound: Cleansed with Wound cleanser and Gauze, Prepped with No Sting, Paste Rings, Drape, and Hydrocolloid  Primary Dressin.5 VF spirals tucked onto undermining and used to fill wound space. Entire area secured with drape.   Secondary (Outer) Dressing: Hole cut, trac pad, sacral offloading dressing x 3    Advanced Wound Care Discharge Planning  Number of Clinicians necessary to complete wound care: 2 for turning   Is patient requiring IV pain medications for dressing changes:  No   Length of time for dressing change 50 min. (This does not include chart review, pre-medication time, set up, clean up or time spent charting.)    Interdisciplinary consultation: Patient, Bedside RN, N/A.  Pressure injury and staging reviewed with N/A.    EVALUATION /  RATIONALE FOR TREATMENT:     Date:  10/20/23  Wound Status:  Wound progressing as expected    S/p I&D with Dr. Medrano, patient now with large open surgical wound to sacrum. Wound bed still with stringy, adherent slough at the wound space and cavernous undermining. Bone palpable beneath a layer of slough. NPWT applied to assist with wound closure by secondary intention, management of bio-burden and exudate through mechanical debridement, and increase oxygenation and granulation tissue production to wound bed. VF with NS instillation at this time, however two bottles of Dakins at bedside should wound and amount of slough be unchanged at next assessment. Please bring a Dakins adaptor.     Pt remains incontinent of stool which may compromise VAC seal. If this remains to be a problem, he may benefit from an ostomy creation in the future.     Date:  10/18/23  Wound Status:  Initial evaluation    POA unstageable pressure injury to sacrum. Upon first assessment, wound noted to have liquefying muscle, adipose, and stringy slough within wound bed. Bedside CSWD was done as much as patient could tolerate, however even with bedside CSWD, wound bed is still 100%. Surrounding skin noted to have hyperpigmented fungal-appearing rash. Miconazole cream utilized on this area.  Currently this wound has too much slough for a Wound Vac to be effective, patient would benefit from surgical debridement to reveal viable wound bed in which afterwards Vac therapy could be initiated. Patient is incontinent and may benefit from ostomy creation in the future if fecal incontinence continuously compromises Vac seal.  Dakins wet to dry applied to initiate chemical debridement of nonviable tissue, decrease bioburden and odor. Dr. Quinteros updated of recommendations.    R thigh & R posterior calf with POA sDTI, source of pressure may be patient's chronic indwelling catheter. Hydrocolloid applied to provide moist, occlusive environment for wound  healing.  L Heel with POA sDTI, area already covered with offloading adhesive foam, recommend continuation of this in addition to moon boots.         Goals: Steady decrease in wound area and depth weekly.    NURSING PLAN OF CARE ORDERS:  Dressing changes: See Dressing Care orders    NUTRITION RECOMMENDATIONS   Wound Team Recommendations:  N/A     DIET ORDERS (From admission to next 24h)       Start     Ordered    10/20/23 1418  Supplements  ALL MEALS        Comments: Also, Rick per prior order.   Question:  Which Supplement  Answer:  BOOST PLUS  Comment:  and/or Ensure Plus    10/20/23 1419    10/19/23 1624  Diet Order Diet: Cardiac  ALL MEALS        Question:  Diet:  Answer:  Cardiac    10/19/23 1624    10/18/23 1500  Supplements  BETWEEN MEALS        Question:  Which Supplement  Answer:  OTHER (see comments)  Comment:  Rick    10/18/23 1402                    PREVENTATIVE INTERVENTIONS:   Q shift Lawrence - performed per nursing policy  Q shift pressure point assessments - performed per nursing policy    Surface/Positioning  Low Airloss - Currently in Place  Reposition q 2 hours - Currently in Place  TAPs Turning system - Currently in Place    Offloading/Redistribution  Sacral offloading dressing (Silicone dressing) - Currently in Place  Heel float boots (Prevalon boot) - Currently in Place      Respiratory  Silicone O2 tubing - Currently in Place  Gray Foam Ear protectors - Currently in Place    Containment/Moisture Prevention    Barrier paste - Currently in Place  Antifungal treatment - Currently in Place    Anticipated discharge plans:  TBD        Vac Discharge Needs:  Vac Discharge plan is purely a recommendation from wound team and not a requirement for discharge unless otherwise stated by physician.  Veraflo Vac while inpatient, will need to remain on Veraflo Vac upon discharge

## 2023-10-21 LAB
ALBUMIN SERPL BCP-MCNC: 1.5 G/DL (ref 3.2–4.9)
ALBUMIN/GLOB SERPL: 0.6 G/DL
ALP SERPL-CCNC: 139 U/L (ref 30–99)
ALT SERPL-CCNC: 24 U/L (ref 2–50)
ANION GAP SERPL CALC-SCNC: 4 MMOL/L (ref 7–16)
AST SERPL-CCNC: 32 U/L (ref 12–45)
BILIRUB SERPL-MCNC: 0.3 MG/DL (ref 0.1–1.5)
BUN SERPL-MCNC: 20 MG/DL (ref 8–22)
CALCIUM ALBUM COR SERPL-MCNC: 9.9 MG/DL (ref 8.5–10.5)
CALCIUM SERPL-MCNC: 7.9 MG/DL (ref 8.5–10.5)
CHLORIDE SERPL-SCNC: 110 MMOL/L (ref 96–112)
CO2 SERPL-SCNC: 24 MMOL/L (ref 20–33)
CREAT SERPL-MCNC: 0.95 MG/DL (ref 0.5–1.4)
ERYTHROCYTE [DISTWIDTH] IN BLOOD BY AUTOMATED COUNT: 65.8 FL (ref 35.9–50)
GFR SERPLBLD CREATININE-BSD FMLA CKD-EPI: 77 ML/MIN/1.73 M 2
GLOBULIN SER CALC-MCNC: 2.6 G/DL (ref 1.9–3.5)
GLUCOSE SERPL-MCNC: 79 MG/DL (ref 65–99)
HCT VFR BLD AUTO: 25.6 % (ref 42–52)
HGB BLD-MCNC: 7.9 G/DL (ref 14–18)
MCH RBC QN AUTO: 28.8 PG (ref 27–33)
MCHC RBC AUTO-ENTMCNC: 30.9 G/DL (ref 32.3–36.5)
MCV RBC AUTO: 93.4 FL (ref 81.4–97.8)
PLATELET # BLD AUTO: 49 K/UL (ref 164–446)
PLATELETS.RETICULATED NFR BLD AUTO: 5 % (ref 0.6–13.1)
PMV BLD AUTO: 12.7 FL (ref 9–12.9)
POTASSIUM SERPL-SCNC: 3.6 MMOL/L (ref 3.6–5.5)
PROT SERPL-MCNC: 4.1 G/DL (ref 6–8.2)
RBC # BLD AUTO: 2.74 M/UL (ref 4.7–6.1)
SODIUM SERPL-SCNC: 138 MMOL/L (ref 135–145)
WBC # BLD AUTO: 5.4 K/UL (ref 4.8–10.8)

## 2023-10-21 PROCEDURE — 80053 COMPREHEN METABOLIC PANEL: CPT

## 2023-10-21 PROCEDURE — 700102 HCHG RX REV CODE 250 W/ 637 OVERRIDE(OP)

## 2023-10-21 PROCEDURE — 770001 HCHG ROOM/CARE - MED/SURG/GYN PRIV*

## 2023-10-21 PROCEDURE — 85027 COMPLETE CBC AUTOMATED: CPT

## 2023-10-21 PROCEDURE — 99232 SBSQ HOSP IP/OBS MODERATE 35: CPT | Mod: GC | Performed by: FAMILY MEDICINE

## 2023-10-21 PROCEDURE — 700105 HCHG RX REV CODE 258

## 2023-10-21 PROCEDURE — A9270 NON-COVERED ITEM OR SERVICE: HCPCS

## 2023-10-21 PROCEDURE — 85055 RETICULATED PLATELET ASSAY: CPT

## 2023-10-21 PROCEDURE — 36415 COLL VENOUS BLD VENIPUNCTURE: CPT

## 2023-10-21 PROCEDURE — A9270 NON-COVERED ITEM OR SERVICE: HCPCS | Performed by: BEHAVIOR ANALYST

## 2023-10-21 PROCEDURE — 700102 HCHG RX REV CODE 250 W/ 637 OVERRIDE(OP): Performed by: BEHAVIOR ANALYST

## 2023-10-21 RX ADMIN — MEMANTINE HYDROCHLORIDE 5 MG: 10 TABLET ORAL at 05:57

## 2023-10-21 RX ADMIN — MINOCYCLINE HYDROCHLORIDE 100 MG: 50 CAPSULE ORAL at 05:57

## 2023-10-21 RX ADMIN — TAMSULOSIN HYDROCHLORIDE 0.4 MG: 0.4 CAPSULE ORAL at 17:58

## 2023-10-21 RX ADMIN — SODIUM CHLORIDE, POTASSIUM CHLORIDE, SODIUM LACTATE AND CALCIUM CHLORIDE: 600; 310; 30; 20 INJECTION, SOLUTION INTRAVENOUS at 00:08

## 2023-10-21 RX ADMIN — SODIUM CHLORIDE, POTASSIUM CHLORIDE, SODIUM LACTATE AND CALCIUM CHLORIDE: 600; 310; 30; 20 INJECTION, SOLUTION INTRAVENOUS at 11:03

## 2023-10-21 RX ADMIN — POTASSIUM CHLORIDE 10 MEQ: 1500 TABLET, EXTENDED RELEASE ORAL at 05:56

## 2023-10-21 RX ADMIN — OMEPRAZOLE 20 MG: 20 CAPSULE, DELAYED RELEASE ORAL at 05:56

## 2023-10-21 RX ADMIN — MINOCYCLINE HYDROCHLORIDE 100 MG: 50 CAPSULE ORAL at 17:59

## 2023-10-21 RX ADMIN — MICONAZOLE NITRATE: 20 CREAM TOPICAL at 05:59

## 2023-10-21 ASSESSMENT — PAIN DESCRIPTION - PAIN TYPE
TYPE: ACUTE PAIN
TYPE: ACUTE PAIN

## 2023-10-21 ASSESSMENT — FIBROSIS 4 INDEX: FIB4 SCORE: 11.73

## 2023-10-21 NOTE — CARE PLAN
The patient is Stable - Low risk of patient condition declining or worsening    Shift Goals  Clinical Goals: Q2 turns, Skin integrity, Nutrition  Patient Goals: Comfort, Rest  Family Goals: MARIVEL    Progress made toward(s) clinical / shift goals:      Problem: Knowledge Deficit - Standard  Goal: Patient and family/care givers will demonstrate understanding of plan of care, disease process/condition, diagnostic tests and medications  Outcome: Progressing     Problem: Fluid Volume  Goal: Fluid volume balance will be maintained  Outcome: Progressing     Problem: Pain - Standard  Goal: Alleviation of pain or a reduction in pain to the patient’s comfort goal  Outcome: Progressing     Problem: Nutrition  Goal: Patient's nutritional and fluid intake will be adequate or improve  Outcome: Progressing  Flowsheets (Taken 10/20/2023 2211)  Oral Nutrition Supplement:   Boost   Between 50-75% Consumed  Oral Nutrition:   Dinner   Less than 25% Consumed

## 2023-10-21 NOTE — PROGRESS NOTES
Bedside report received from night RN, pt care assumed, assessment completed. Pt is A&O1, pain 0/10, pt is medical. Updated on POC, questions answered. Bed in lowest, locked position, treaded socks on, call light and belongings within reach.

## 2023-10-21 NOTE — PROGRESS NOTES
Patient blood pressure 76/60, sat patient up and rechecked BP 92/68. Doctor notified. No new orders at this time.

## 2023-10-21 NOTE — CARE PLAN
The patient is Watcher - Medium risk of patient condition declining or worsening    Shift Goals  Clinical Goals: Q2 turns, nutrition  Patient Goals: comfort  Family Goals: n/a    Progress made toward(s) clinical / shift goals:    Problem: Skin Integrity  Goal: Skin integrity is maintained or improved  Outcome: Progressing  Note: Wound care done Qshift and PRN, wound vac in place for sacral wound, pt on Q2 turns and low air loss bed     Problem: Pain - Standard  Goal: Alleviation of pain or a reduction in pain to the patient’s comfort goal  Outcome: Progressing  Note: Assess and monitor for pain. Provide pharmacological and non-pharmacological interventions as appropriate. Re-evaluate and continue to monitor.        Problem: Nutrition  Goal: Patient's nutritional and fluid intake will be adequate or improve  Outcome: Progressing  Note: Pt oral intake increased this shift along with added boost supplements       Patient is not progressing towards the following goals:

## 2023-10-21 NOTE — PROGRESS NOTES
[facility] MEDICINE PROGRESS NOTE     Attending:   Jeannie Barrera M.d.    Resident:   Samantha Will MD    PATIENT:   Trev Carl; [id number]; 1935    ID:   88 y.o. male admitted for possible UTI, sacral wound.    SUBJECTIVE:   No acute events overnight. Patient had sacral wound debridement on 10/19/23. He continues to be alert and oriented only to person. He denies pain anywhere. Wound vac in place per wound care.     OBJECTIVE:  Vitals:    10/20/23 1951 10/21/23 0445 10/21/23 0446 10/21/23 0458   BP: 93/69  (!) 76/60 92/68   Pulse: 70 68     Resp: 18 16     Temp: 36.4 °C (97.5 °F) 36.3 °C (97.3 °F)     TempSrc: Temporal Temporal     SpO2: 93%      Weight:  94.4 kg (208 lb 1.8 oz)     Height:             Intake/Output Summary (Last 24 hours) at 10/21/2023 1344  Last data filed at 10/21/2023 1216  Gross per 24 hour   Intake --   Output 1300 ml   Net -1300 ml           PHYSICAL EXAM:  General: No acute distress, afebrile, resting comfortably, conversational   HEENT: NC/AT. EOMI.   Cardiovascular: regularly irregular without murmurs, rubs, heaves. Normal capillary refill   Respiratory: CTAB, no tachypnea or retractions   Abdomen: normal bowel sounds, soft, nontender, nondistended, no masses, no organomegaly   EXT: Patient has a very large and deep chronic sacral wound, wound vac in place   Skin: No erythema/lesions   Neuro: Non-focal, alert and orientated x1, moves all extremities     LABS:  Recent Labs     10/19/23  0212 10/20/23  0920 10/21/23  0104   WBC 5.6 5.0 5.4   RBC 2.86* 2.73* 2.74*   HEMOGLOBIN 8.4* 8.0* 7.9*   HEMATOCRIT 25.7* 24.5* 25.6*   MCV 89.9 89.7 93.4   MCH 29.4 29.3 28.8   RDW 63.3* 62.4* 65.8*   PLATELETCT 67* 52* 49*   MPV 10.7 10.9 12.7   NEUTSPOLYS 56.70 56.50  --    LYMPHOCYTES 29.40 35.10  --    MONOCYTES 7.50 6.20  --    EOSINOPHILS 4.80 0.80  --    BASOPHILS 0.70 0.40  --      Recent Labs     10/19/23  0212 10/20/23  0920 10/21/23  0104   SODIUM 141 138 138   POTASSIUM 3.5* 3.8 3.6  "  CHLORIDE 111 109 110   CO2 22 21 24   BUN 22 23* 20   CREATININE 1.10 1.06 0.95   CALCIUM 7.9* 7.7* 7.9*   ALBUMIN 1.4* 1.7* 1.5*     Estimated GFR/CRCL = Estimated Creatinine Clearance: 62 mL/min (by C-G formula based on SCr of 0.95 mg/dL).  Recent Labs     10/19/23  0212 10/20/23  0920 10/21/23  0104   GLUCOSE 84 108* 79     Recent Labs     10/19/23  0212 10/20/23  0920 10/21/23  0104   ASTSGOT 24 24 32   ALTSGPT 17 22 24   TBILIRUBIN 0.3 0.3 0.3   ALKPHOSPHAT 140* 130* 139*   GLOBULIN see below 2.4 2.6             No results for input(s): \"INR\", \"APTT\", \"FIBRINOGEN\" in the last 72 hours.    Invalid input(s): \"DIMER\"      IMAGING:  DX-CHEST-PORTABLE (1 VIEW)   Final Result      No radiographic evidence of acute cardiopulmonary disease.          MEDS:  Current Facility-Administered Medications   Medication Last Admin    dakins 0.125% (1/4 strength) topical soln      miconazole (Micotin) 2 % cream Given at 10/21/23 0559    minocycline (Minocin) capsule 100 mg 100 mg at 10/21/23 0557    polyethylene glycol/lytes (Miralax) PACKET 1 Packet      And    magnesium hydroxide (Milk Of Magnesia) suspension 30 mL      And    bisacodyl (Dulcolax) suppository 10 mg      lactated ringers infusion New Bag at 10/21/23 1103    labetalol (Normodyne/Trandate) injection 10 mg      acetaminophen (Tylenol) tablet 650 mg 650 mg at 10/18/23 1215    melatonin tablet 3 mg 3 mg at 10/20/23 2039    memantine (Namenda) tablet 5 mg 5 mg at 10/21/23 0557    omeprazole (PriLOSEC) capsule 20 mg 20 mg at 10/21/23 0556    potassium chloride SA (Kdur) tablet 10 mEq 10 mEq at 10/21/23 0556    senna-docusate (Pericolace Or Senokot S) 8.6-50 MG per tablet 1 Tablet 1 Tablet at 10/20/23 2038    tamsulosin (Flomax) capsule 0.4 mg 0.4 mg at 10/20/23 2167       ASSESSMENT/PLAN:  88-year-old male past medical history of chronic sacral wound and potential UTI given AMS.    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  No leukocytosis, patient does not appear septic.  Lactic acid was elevated at 2.5, repeat was 2.7.  He does have indwelling Granger catheter and frequent UTIs.  Last month, patient had Klebsiella ESBL UTI and bacteremia.  Lactic stable, HD stable, no leukocytosis, Urine culture grew klebsiella similar to prior hospitalization. 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    -IV maintenance fluids  - abx were discontinued as UTI is unlikely given no leukocytosis, ID pharm agreed     Sacral decubitus ulcer  Assessment & Plan  Patient has history of sacral decubitus ulcer.  He resides in skilled nursing facility.  Wound large and deep, altho does not have infectious picture at this time  -agree w/ ID pharm discussion on d/c abx at this time and restarting if s/s of infection arise  -s/p OR for debridement, recommendations of wound vac and wound care per surgery   - Wound care     AMS (altered mental status)  Assessment & Plan  Patient has altered mental status reported at admission, however per chart review patient is often AxOx2 or AxOx1. He is currently not showing signs of infection.     - Continue to monitor  - Will discuss with grandson further options for outpatient care   - Palliative consult placed, likely will not see until Monday    Atrial fibrillation (HCC)- (present on admission)  Assessment & Plan  Patient has history of A-fib, anticoagulated.  We will continue his home Xarelto.  -Continue home Xarelto  -Telemetry monitoring    Anemia- (present on admission)  Assessment & Plan  Normocytic.  Patient has chronic anemia, currently at his baseline at 10.  Likely anemia of chronic disease or combined picture.  -Iron/TIBC were low       Code Status: dnr/dni    Dispo: Medically cleared, pending placement and palliative consult, wound vac is in place

## 2023-10-22 PROBLEM — R60.9 EDEMA: Status: ACTIVE | Noted: 2023-10-22

## 2023-10-22 LAB
ANION GAP SERPL CALC-SCNC: 7 MMOL/L (ref 7–16)
BACTERIA BLD CULT: NORMAL
BACTERIA BLD CULT: NORMAL
BUN SERPL-MCNC: 19 MG/DL (ref 8–22)
CALCIUM SERPL-MCNC: 7.9 MG/DL (ref 8.5–10.5)
CHLORIDE SERPL-SCNC: 108 MMOL/L (ref 96–112)
CO2 SERPL-SCNC: 23 MMOL/L (ref 20–33)
CREAT SERPL-MCNC: 0.92 MG/DL (ref 0.5–1.4)
ERYTHROCYTE [DISTWIDTH] IN BLOOD BY AUTOMATED COUNT: 63.2 FL (ref 35.9–50)
GFR SERPLBLD CREATININE-BSD FMLA CKD-EPI: 80 ML/MIN/1.73 M 2
GLUCOSE SERPL-MCNC: 77 MG/DL (ref 65–99)
HCT VFR BLD AUTO: 27.2 % (ref 42–52)
HGB BLD-MCNC: 8.5 G/DL (ref 14–18)
MCH RBC QN AUTO: 28.4 PG (ref 27–33)
MCHC RBC AUTO-ENTMCNC: 31.3 G/DL (ref 32.3–36.5)
MCV RBC AUTO: 91 FL (ref 81.4–97.8)
PLATELET # BLD AUTO: 56 K/UL (ref 164–446)
PLATELETS.RETICULATED NFR BLD AUTO: 6.5 % (ref 0.6–13.1)
PMV BLD AUTO: 11.5 FL (ref 9–12.9)
POTASSIUM SERPL-SCNC: 3.5 MMOL/L (ref 3.6–5.5)
RBC # BLD AUTO: 2.99 M/UL (ref 4.7–6.1)
SIGNIFICANT IND 70042: NORMAL
SIGNIFICANT IND 70042: NORMAL
SITE SITE: NORMAL
SITE SITE: NORMAL
SODIUM SERPL-SCNC: 138 MMOL/L (ref 135–145)
SOURCE SOURCE: NORMAL
SOURCE SOURCE: NORMAL
WBC # BLD AUTO: 7.9 K/UL (ref 4.8–10.8)

## 2023-10-22 PROCEDURE — 700102 HCHG RX REV CODE 250 W/ 637 OVERRIDE(OP): Performed by: BEHAVIOR ANALYST

## 2023-10-22 PROCEDURE — 97605 NEG PRS WND THER DME<=50SQCM: CPT

## 2023-10-22 PROCEDURE — 36415 COLL VENOUS BLD VENIPUNCTURE: CPT

## 2023-10-22 PROCEDURE — 306591 TRAY SUTURE REMOVAL DISP: Performed by: FAMILY MEDICINE

## 2023-10-22 PROCEDURE — A9270 NON-COVERED ITEM OR SERVICE: HCPCS | Performed by: BEHAVIOR ANALYST

## 2023-10-22 PROCEDURE — 700102 HCHG RX REV CODE 250 W/ 637 OVERRIDE(OP)

## 2023-10-22 PROCEDURE — 97602 WOUND(S) CARE NON-SELECTIVE: CPT

## 2023-10-22 PROCEDURE — 302098 PASTE RING (FLAT): Performed by: FAMILY MEDICINE

## 2023-10-22 PROCEDURE — A9270 NON-COVERED ITEM OR SERVICE: HCPCS

## 2023-10-22 PROCEDURE — 700105 HCHG RX REV CODE 258

## 2023-10-22 PROCEDURE — 770001 HCHG ROOM/CARE - MED/SURG/GYN PRIV*

## 2023-10-22 PROCEDURE — 99232 SBSQ HOSP IP/OBS MODERATE 35: CPT | Mod: GC | Performed by: FAMILY MEDICINE

## 2023-10-22 PROCEDURE — 85027 COMPLETE CBC AUTOMATED: CPT

## 2023-10-22 PROCEDURE — 80048 BASIC METABOLIC PNL TOTAL CA: CPT

## 2023-10-22 PROCEDURE — 85055 RETICULATED PLATELET ASSAY: CPT

## 2023-10-22 RX ORDER — FUROSEMIDE 20 MG/1
20 TABLET ORAL ONCE
Status: COMPLETED | OUTPATIENT
Start: 2023-10-22 | End: 2023-10-22

## 2023-10-22 RX ADMIN — POTASSIUM CHLORIDE 10 MEQ: 1500 TABLET, EXTENDED RELEASE ORAL at 05:48

## 2023-10-22 RX ADMIN — MINOCYCLINE HYDROCHLORIDE 100 MG: 50 CAPSULE ORAL at 05:50

## 2023-10-22 RX ADMIN — Medication 3 MG: at 21:01

## 2023-10-22 RX ADMIN — MICONAZOLE NITRATE: 20 CREAM TOPICAL at 17:38

## 2023-10-22 RX ADMIN — MINOCYCLINE HYDROCHLORIDE 100 MG: 50 CAPSULE ORAL at 17:38

## 2023-10-22 RX ADMIN — SENNOSIDES AND DOCUSATE SODIUM 1 TABLET: 50; 8.6 TABLET ORAL at 05:48

## 2023-10-22 RX ADMIN — SODIUM CHLORIDE, POTASSIUM CHLORIDE, SODIUM LACTATE AND CALCIUM CHLORIDE: 600; 310; 30; 20 INJECTION, SOLUTION INTRAVENOUS at 06:30

## 2023-10-22 RX ADMIN — FUROSEMIDE 20 MG: 20 TABLET ORAL at 08:19

## 2023-10-22 RX ADMIN — MEMANTINE HYDROCHLORIDE 5 MG: 10 TABLET ORAL at 05:49

## 2023-10-22 RX ADMIN — OMEPRAZOLE 20 MG: 20 CAPSULE, DELAYED RELEASE ORAL at 05:48

## 2023-10-22 RX ADMIN — SENNOSIDES AND DOCUSATE SODIUM 1 TABLET: 50; 8.6 TABLET ORAL at 21:01

## 2023-10-22 RX ADMIN — TAMSULOSIN HYDROCHLORIDE 0.4 MG: 0.4 CAPSULE ORAL at 17:38

## 2023-10-22 RX ADMIN — MICONAZOLE NITRATE: 20 CREAM TOPICAL at 05:51

## 2023-10-22 ASSESSMENT — FIBROSIS 4 INDEX: FIB4 SCORE: 10.26

## 2023-10-22 ASSESSMENT — PAIN DESCRIPTION - PAIN TYPE
TYPE: ACUTE PAIN
TYPE: ACUTE PAIN

## 2023-10-22 NOTE — PROGRESS NOTES
[facility] [place]     Attending:   Jeannie Barrera M.d.    Resident:   Samantha Will MD    PATIENT:   Trev Carl; [id number]; 1935    ID:   88 y.o. male admitted for possible UTI, sacral wound.    SUBJECTIVE:   Acute events overnight patient had sacral decubitus ulcer debridement on 10/19.  He continues to be alert and oriented only to person.  He denies any pain.  Wound VAC is in place per wound care.  This morning patient did have significant swelling of lower extremities.    OBJECTIVE:  Vitals:    10/21/23 1519 10/21/23 2334 10/22/23 0343 10/22/23 0657   BP: 103/60 118/53  108/70   Pulse: 88 84  99   Resp: 18 16  18   Temp: 36.2 °C (97.2 °F) 36.4 °C (97.5 °F)  36.4 °C (97.5 °F)   TempSrc: Temporal Temporal  Temporal   SpO2: 92% 95%  94%   Weight:   93.7 kg (206 lb 9.1 oz)    Height:             Intake/Output Summary (Last 24 hours) at 10/22/2023 0847  Last data filed at 10/21/2023 1216  Gross per 24 hour   Intake --   Output 1300 ml   Net -1300 ml       PHYSICAL EXAM:  General: No acute distress, afebrile, resting comfortably, conversational   HEENT: NC/AT. EOMI.   Cardiovascular: regularly irregular without murmurs, rubs, heaves. Normal capillary refill   Respiratory: CTAB, no tachypnea or retractions   Abdomen: normal bowel sounds, soft, nontender, nondistended, no masses, no organomegaly   EXT: Patient has a very large and deep chronic sacral wound, wound vac in place 2+ pitting edema bilateral lower extremities, pitting edema in right forearm  Skin: No erythema/lesions   Neuro: Non-focal, alert and orientated x1, moves all extremities     LABS:  Recent Labs     10/20/23  0920 10/21/23  0104 10/22/23  0120   WBC 5.0 5.4 7.9   RBC 2.73* 2.74* 2.99*   HEMOGLOBIN 8.0* 7.9* 8.5*   HEMATOCRIT 24.5* 25.6* 27.2*   MCV 89.7 93.4 91.0   MCH 29.3 28.8 28.4   RDW 62.4* 65.8* 63.2*   PLATELETCT 52* 49* 56*   MPV 10.9 12.7 11.5   NEUTSPOLYS 56.50  --   --    LYMPHOCYTES 35.10  --   --    MONOCYTES 6.20   "--   --    EOSINOPHILS 0.80  --   --    BASOPHILS 0.40  --   --      Recent Labs     10/20/23  0920 10/21/23  0104 10/22/23  0120   SODIUM 138 138 138   POTASSIUM 3.8 3.6 3.5*   CHLORIDE 109 110 108   CO2 21 24 23   BUN 23* 20 19   CREATININE 1.06 0.95 0.92   CALCIUM 7.7* 7.9* 7.9*   ALBUMIN 1.7* 1.5*  --      Estimated GFR/CRCL = Estimated Creatinine Clearance: 63.8 mL/min (by C-G formula based on SCr of 0.92 mg/dL).  Recent Labs     10/20/23  0920 10/21/23  0104 10/22/23  0120   GLUCOSE 108* 79 77     Recent Labs     10/20/23  0920 10/21/23  0104   ASTSGOT 24 32   ALTSGPT 22 24   TBILIRUBIN 0.3 0.3   ALKPHOSPHAT 130* 139*   GLOBULIN 2.4 2.6             No results for input(s): \"INR\", \"APTT\", \"FIBRINOGEN\" in the last 72 hours.    Invalid input(s): \"DIMER\"      IMAGING:  DX-CHEST-PORTABLE (1 VIEW)   Final Result      No radiographic evidence of acute cardiopulmonary disease.          MEDS:  Current Facility-Administered Medications   Medication Last Admin    dakins 0.125% (1/4 strength) topical soln      miconazole (Micotin) 2 % cream Given at 10/22/23 0551    minocycline (Minocin) capsule 100 mg 100 mg at 10/22/23 0550    polyethylene glycol/lytes (Miralax) PACKET 1 Packet      And    magnesium hydroxide (Milk Of Magnesia) suspension 30 mL      And    bisacodyl (Dulcolax) suppository 10 mg      labetalol (Normodyne/Trandate) injection 10 mg      acetaminophen (Tylenol) tablet 650 mg 650 mg at 10/18/23 1215    melatonin tablet 3 mg 3 mg at 10/20/23 2039    memantine (Namenda) tablet 5 mg 5 mg at 10/22/23 0549    omeprazole (PriLOSEC) capsule 20 mg 20 mg at 10/22/23 0548    potassium chloride SA (Kdur) tablet 10 mEq 10 mEq at 10/22/23 0548    senna-docusate (Pericolace Or Senokot S) 8.6-50 MG per tablet 1 Tablet 1 Tablet at 10/22/23 0548    tamsulosin (Flomax) capsule 0.4 mg 0.4 mg at 10/21/23 9840       ASSESSMENT/PLAN:  88-year-old male past medical history of chronic sacral wound and potential UTI given AMS.    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  No leukocytosis, patient does not appear septic.  Lactic acid was elevated at 2.5, repeat was 2.7.  He does have indwelling Granger catheter and frequent UTIs.  Last month, patient had Klebsiella ESBL UTI and bacteremia.  Lactic stable, HD stable, no leukocytosis, Urine culture grew klebsiella similar to prior hospitalization. 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    -IV maintenance fluids  - abx were discontinued as UTI is unlikely given no leukocytosis, ID pharm agreed     Edema  Assessment & Plan  Patient on 10/22 had pitting edema in his lower extremities and some edema in his upper extremities.  Lungs were still clear to auscultation.    - Discontinued maintenance fluids  - Lasix 20 mg p.o. once, will continue to monitor I's and O's and reassess for additional doses    Sacral decubitus ulcer  Assessment & Plan  Patient has history of sacral decubitus ulcer.  He resides in skilled nursing facility. Wound large and deep, although does not have infectious picture at this time    -agree w/ ID pharm discussion on d/c abx at this time and restarting if s/s of infection arise  -s/p OR for debridement, recommendations of wound vac and wound care per surgery   - Wound care with wound VAC in place    AMS (altered mental status)  Assessment & Plan  Patient has altered mental status reported at admission, however per chart review patient is often AxOx2 or AxOx1. He is currently not showing signs of infection.  Patient has been a times O x1 since admission without any fluctuations in his mental status.    - Continue to monitor  -We will attempt to reach grandson again for discussion of further options of outpatient care  - Palliative consult placed, likely will not see until Monday    Atrial fibrillation (HCC)- (present on  admission)  Assessment & Plan  Patient has history of A-fib, anticoagulated.  We will continue his home Xarelto.  -Continue home Xarelto  -Telemetry monitoring    Anemia- (present on admission)  Assessment & Plan  Normocytic.  Patient has chronic anemia, currently at his baseline at 10.  Likely anemia of chronic disease or combined picture.  -Iron/TIBC were low       Code Status: dnr/dni    Dispo: Medically cleared, pending placement and palliative consult, wound vac is in place

## 2023-10-22 NOTE — CARE PLAN
The patient is Watcher - Medium risk of patient condition declining or worsening    Shift Goals  Clinical Goals: Monitor Hgb, turns  Patient Goals: Sleep  Family Goals: n/a    Progress made toward(s) clinical / shift goals:      Patient is not progressing towards the following goals:Patient is unable to understand information provided.       Problem: Knowledge Deficit - Standard  Goal: Patient and family/care givers will demonstrate understanding of plan of care, disease process/condition, diagnostic tests and medications  Description: Target End Date:  1-3 days or as soon as patient condition allows    Document in Patient Education    1.  Patient and family/caregiver oriented to unit, equipment, visitation policy and means for communicating concern  2.  Complete/review Learning Assessment  3.  Assess knowledge level of disease process/condition, treatment plan, diagnostic tests and medications  4.  Explain disease process/condition, treatment plan, diagnostic tests and medications  Outcome: Not Progressing  Note: Patient unable to take in any teaching as he is currently confused.

## 2023-10-22 NOTE — WOUND TEAM
Assisted Shahida CHATMAN (Wound RN), with wound care, non-selective debridement performed using wound cleanser/NS and gauze. Please see Shahida CHATMAN (Wound RN) wound note for further wound care details.

## 2023-10-22 NOTE — ASSESSMENT & PLAN NOTE
Patient on 10/22 had pitting edema in his lower extremities and some edema in his upper extremities.  Lungs were still clear to auscultation.    - Discontinued maintenance fluids  - Lasix 20 mg p.o. once, will continue to monitor I's and O's and reassess for additional doses

## 2023-10-22 NOTE — WOUND TEAM
[facility]  Inpatient Services  Wound and Skin Care Follow-up    Admission Date: 10/17/2023     Last order of IP CONSULT TO WOUND CARE was found on 10/17/2023 from Hospital Encounter on 10/17/2023     HPI, PMH, SH: Reviewed    Past Surgical History:   Procedure Laterality Date    IRRIGATION & DEBRIDEMENT GENERAL N/A 10/19/2023    Procedure: IRRIGATION AND DEBRIDEMENT, SACRUM;  Surgeon: Amor Medrano M.D.;  Location: SURGERY [facility];  Service: Trauma     Social History     Tobacco Use    Smoking status: Never    Smokeless tobacco: Never   Substance Use Topics    Alcohol use: Not on file     Chief Complaint   Patient presents with    ALOC    Wound Infection     Diagnosis: Complicated UTI (urinary tract infection) [N39.0]    Unit where seen by Wound Team: [id number]/00     WOUND FOLLOW UP RELATED TO:  VAC change to sacrum        WOUND TEAM PLAN OF CARE - Frequency of Follow-up:   Nursing to follow dressing orders written for wound care. Contact wound team if area fails to progress, deteriorates or with any questions/concerns if something comes up before next scheduled follow up (See below as to whether wound is following and frequency of wound follow up)  Dressing changes by wound team:                   NPWT change 3 times weekly -      WOUND HISTORY:       88 y.o. male admitted from [facility] for AMS. Patient has a chronic graham. Was previously seen by wound team on 9/21 and noted to have a POA unstageable pressure injury to sacrum, although wound is much worse now when comparing clinical media images. Patient with spiraled Hydrofera blue dressing in place at time of initial assessment.       WOUND ASSESSMENT/LDA    Wound 09/20/23 Pressure Injury Sacrum POA unstageable -->10/19 I&D with Dr. Medrano (Active)   Date First Assessed/Time First Assessed: 09/20/23 0000   Present on Original Admission: Yes  Hand Hygiene Completed: Yes  Primary Wound Type: Pressure Injury  Location: Sacrum  Wound Description  (Comments): POA unstageable -->10/19 I&D with Dr. Medrano      Assessments 10/22/2023 10:30 AM   Wound Image     Site Assessment Red;Yellow;Slough;Undermining   Periwound Assessment Fragile;Red;Satellite lesions   Margins Defined edges;Unattached edges   Closure Secondary intention   Drainage Amount Small   Drainage Description Serosanguineous   Treatments Cleansed;Site care;Offloading   Offloading/DME Heel float boot   Wound Cleansing Approved Wound Cleanser   Periwound Protectant Hydrocolloid;No-sting Skin Prep;Paste Ring   Dressing Status Clean;Dry;Intact   Dressing Changed Changed   Dressing Cleansing/Solutions 1/4 Strength Dakin's Solution   Dressing Options Wound Vac;Offloading Dressing - Sacral   Dressing Change/Treatment Frequency Monday, Wednesday, Friday, and As Needed   NEXT Dressing Change/Treatment Date 10/25/23   NEXT Weekly Photo (Inpatient Only) 10/27/23   Wound Team Following 3x Weekly   Pressure Injury Stage Stage 4   Shape irregular   Wound Odor None   WOUND NURSE ONLY - Time Spent with Patient (mins) 45       Negative Pressure Wound Therapy 10/20/23 Surgical;Pressure injury: stage 4 Sacrum (Active)   Placement Date/Time: 10/20/23 1647   Wound Type: Surgical;Pressure injury: stage 4  Location: Sacrum      Assessments 10/22/2023 10:30 AM   NPWT Pump Mode / Pressure Setting Ulta;Intermittent;125 mmHg   Dressing Type Medium;Black Foam (Veraflo)   Number of Foam Pieces Used 1   Canister Changed No   NEXT Dressing Change/Treatment Date 10/25/23   VAC VeraFlo Irrigant 1/4 Strength Dakins   VAC VeraFlo Soak Time (mins) 10   VAC VeraFlo Instill Volume (ml) 36   VAC VeraFlo - Therapy Time (hrs) 2   VAC VeraFlo Pressure (mm/Hg) Intermittent;125 mmHg   WOUND NURSE ONLY - Time Spent with Patient (mins) 45        Vascular:    FIONA:   No results found.    Lab Values:    Lab Results   Component Value Date/Time    WBC 7.9 10/22/2023 01:20 AM    RBC 2.99 (L) 10/22/2023 01:20 AM    HEMOGLOBIN 8.5 (L) 10/22/2023 01:20  AM    HEMATOCRIT 27.2 (L) 10/22/2023 01:20 AM    SEDRATEWES 59 (H) 10/17/2023 02:13 PM         Culture Results show:  No results found for this or any previous visit (from the past 720 hour(s)).    Pain Level/Medicated:   None today, however patient now more oriented than previous VAC changes and may need pain medicine in the future.         INTERVENTIONS BY WOUND TEAM:  Chart and images reviewed. Discussed with bedside RN. All areas of concern (based on picture review, LDA review and discussion with bedside RN) have been thoroughly assessed. Documentation of areas based on significant findings. This RN in to assess patient. Performed standard wound care which includes appropriate positioning, dressing removal and non-selective debridement. Pictures and measurements obtained weekly if/when required.    Wound:  Sacrum   Preparation for Dressing removal: Dressing soaked with wound cleanser  Cleansed/Non-selectively Debrided with:  Wound cleanser and Gauze  Sarah wound: Cleansed with Wound cleanser and Gauze, Prepped with No Sting, Paste Rings, Drape, and Hydrocolloid  Primary Dressin pc VF spiral used to fill in wound space, then secured with drape.   Secondary (Outer) Dressing: Hole cut, trac pad, sacral allevyn dressing x3    Advanced Wound Care Discharge Planning  Number of Clinicians necessary to complete wound care: 2 for turning   Is patient requiring IV pain medications for dressing changes:  No   Length of time for dressing change 40 min. (This does not include chart review, pre-medication time, set up, clean up or time spent charting.)    Interdisciplinary consultation: Patient, Bedside RN, Garry LIRIANO (Wound RN).  Pressure injury and staging reviewed with N/A.    EVALUATION / RATIONALE FOR TREATMENT:     Date:  10/22/23  Wound Status:  No change in wound     Wound bed remains obscured with slough and non-viable tissue. Instillation fluid switched from NS to Dakins to encourage further debridement of wound  bed.     Date:  10/20/23  Wound Status:  Wound progressing as expected    S/p I&D with Dr. Medrano, patient now with large open surgical wound to sacrum. Wound bed still with stringy, adherent slough at the wound space and cavernous undermining. Bone palpable beneath a layer of slough. NPWT applied to assist with wound closure by secondary intention, management of bio-burden and exudate through mechanical debridement, and increase oxygenation and granulation tissue production to wound bed. VF with NS instillation at this time, however two bottles of Dakins at bedside should wound and amount of slough be unchanged at next assessment. Please bring a Dakins adaptor.     Pt remains incontinent of stool which may compromise VAC seal. If this remains to be a problem, he may benefit from an ostomy creation in the future.     Date:  10/18/23  Wound Status:  Initial evaluation    POA unstageable pressure injury to sacrum. Upon first assessment, wound noted to have liquefying muscle, adipose, and stringy slough within wound bed. Bedside CSWD was done as much as patient could tolerate, however even with bedside CSWD, wound bed is still 100%. Surrounding skin noted to have hyperpigmented fungal-appearing rash. Miconazole cream utilized on this area.  Currently this wound has too much slough for a Wound Vac to be effective, patient would benefit from surgical debridement to reveal viable wound bed in which afterwards Vac therapy could be initiated. Patient is incontinent and may benefit from ostomy creation in the future if fecal incontinence continuously compromises Vac seal.  Dakins wet to dry applied to initiate chemical debridement of nonviable tissue, decrease bioburden and odor. Dr. Quinteros updated of recommendations.    R thigh & R posterior calf with POA sDTI, source of pressure may be patient's chronic indwelling catheter. Hydrocolloid applied to provide moist, occlusive environment for wound healing.  L Heel with POA  sDTI, area already covered with offloading adhesive foam, recommend continuation of this in addition to moon boots.         Goals: Steady decrease in wound area and depth weekly.    NURSING PLAN OF CARE ORDERS:  No new orders this visit    NUTRITION RECOMMENDATIONS   Wound Team Recommendations:  N/A     DIET ORDERS (From admission to next 24h)       Start     Ordered    10/20/23 1418  Supplements  ALL MEALS        Comments: Also, Rick per prior order.   Question:  Which Supplement  Answer:  BOOST PLUS  Comment:  and/or Ensure Plus    10/20/23 1419    10/19/23 1624  Diet Order Diet: Cardiac  ALL MEALS        Question:  Diet:  Answer:  Cardiac    10/19/23 1624    10/18/23 1500  Supplements  BETWEEN MEALS        Question:  Which Supplement  Answer:  OTHER (see comments)  Comment:  Rick    10/18/23 1402                    PREVENTATIVE INTERVENTIONS:   Q shift Lawrence - performed per nursing policy  Q shift pressure point assessments - performed per nursing policy    Surface/Positioning  Low Airloss - Currently in Place  Reposition q 2 hours - Currently in Place  TAPs Turning system - Currently in Place    Offloading/Redistribution  Sacral offloading dressing (Silicone dressing) - Applied this Visit  Heel float boots (Prevalon boot) - Currently in Place    Anticipated discharge plans:  TBD        Vac Discharge Needs:  Vac Discharge plan is purely a recommendation from wound team and not a requirement for discharge unless otherwise stated by physician.  Veraflo Vac while inpatient, will need to remain on Veraflo Vac upon discharge

## 2023-10-22 NOTE — CARE PLAN
The patient is Watcher - Medium risk of patient condition declining or worsening    Shift Goals  Clinical Goals: Q2 turns, strict I/Os  Patient Goals: comfort, drink water  Family Goals: n/a    Progress made toward(s) clinical / shift goals:    Problem: Skin Integrity  Goal: Skin integrity is maintained or improved  Outcome: Progressing  Note: Assess skin and monitor for skin breakdown. Alleviate pressure to bony prominences and provide assistance with Q 2 turns. Use of barrier cream, extra pillows and mepilex as needed. Wound vac in place for sacral wound, seen by wound care team. Continue to monitor.         Problem: Pain - Standard  Goal: Alleviation of pain or a reduction in pain to the patient’s comfort goal  Outcome: Progressing  Note: Assess and monitor for pain. Provide pharmacological and non-pharmacological interventions as appropriate. Re-evaluate and continue to monitor.          Patient is not progressing towards the following goals:      Problem: Mobility  Goal: Patient's capacity to carry out activities will improve  Outcome: Not Progressing  Note: Pt unable to mobilize due to increased weakness. Passive ROM performed

## 2023-10-22 NOTE — PROGRESS NOTES
Assumed care of patient at bedside report from NOC RN. Updated on POC. Patient currently A & O x 1; on RA; pt is bed bound ; without complaints of acute pain. Call light within reach. Whiteboard updated. Fall precautions in place. Bed locked and in lowest position. All questions answered. No other needs indicated at this time.   Patient laying in bed alert x1 at this time.

## 2023-10-23 LAB
ANION GAP SERPL CALC-SCNC: 8 MMOL/L (ref 7–16)
BUN SERPL-MCNC: 23 MG/DL (ref 8–22)
CALCIUM SERPL-MCNC: 7.9 MG/DL (ref 8.5–10.5)
CHLORIDE SERPL-SCNC: 107 MMOL/L (ref 96–112)
CO2 SERPL-SCNC: 22 MMOL/L (ref 20–33)
CREAT SERPL-MCNC: 0.89 MG/DL (ref 0.5–1.4)
GFR SERPLBLD CREATININE-BSD FMLA CKD-EPI: 82 ML/MIN/1.73 M 2
GLUCOSE SERPL-MCNC: 93 MG/DL (ref 65–99)
POTASSIUM SERPL-SCNC: 3.7 MMOL/L (ref 3.6–5.5)
SODIUM SERPL-SCNC: 137 MMOL/L (ref 135–145)

## 2023-10-23 PROCEDURE — 36415 COLL VENOUS BLD VENIPUNCTURE: CPT

## 2023-10-23 PROCEDURE — 700102 HCHG RX REV CODE 250 W/ 637 OVERRIDE(OP): Performed by: BEHAVIOR ANALYST

## 2023-10-23 PROCEDURE — A9270 NON-COVERED ITEM OR SERVICE: HCPCS

## 2023-10-23 PROCEDURE — 99231 SBSQ HOSP IP/OBS SF/LOW 25: CPT | Mod: GC | Performed by: FAMILY MEDICINE

## 2023-10-23 PROCEDURE — A9270 NON-COVERED ITEM OR SERVICE: HCPCS | Performed by: BEHAVIOR ANALYST

## 2023-10-23 PROCEDURE — 770001 HCHG ROOM/CARE - MED/SURG/GYN PRIV*

## 2023-10-23 PROCEDURE — 700102 HCHG RX REV CODE 250 W/ 637 OVERRIDE(OP)

## 2023-10-23 PROCEDURE — 80048 BASIC METABOLIC PNL TOTAL CA: CPT

## 2023-10-23 RX ORDER — FUROSEMIDE 20 MG/1
20 TABLET ORAL ONCE
Status: COMPLETED | OUTPATIENT
Start: 2023-10-23 | End: 2023-10-23

## 2023-10-23 RX ADMIN — MINOCYCLINE HYDROCHLORIDE 100 MG: 50 CAPSULE ORAL at 05:50

## 2023-10-23 RX ADMIN — RIVAROXABAN 10 MG: 10 TABLET, FILM COATED ORAL at 18:11

## 2023-10-23 RX ADMIN — MEMANTINE HYDROCHLORIDE 5 MG: 10 TABLET ORAL at 05:49

## 2023-10-23 RX ADMIN — FUROSEMIDE 20 MG: 20 TABLET ORAL at 18:11

## 2023-10-23 RX ADMIN — SENNOSIDES AND DOCUSATE SODIUM 1 TABLET: 50; 8.6 TABLET ORAL at 05:59

## 2023-10-23 RX ADMIN — TAMSULOSIN HYDROCHLORIDE 0.4 MG: 0.4 CAPSULE ORAL at 16:27

## 2023-10-23 RX ADMIN — OMEPRAZOLE 20 MG: 20 CAPSULE, DELAYED RELEASE ORAL at 05:50

## 2023-10-23 RX ADMIN — SENNOSIDES AND DOCUSATE SODIUM 1 TABLET: 50; 8.6 TABLET ORAL at 18:11

## 2023-10-23 RX ADMIN — MINOCYCLINE HYDROCHLORIDE 100 MG: 50 CAPSULE ORAL at 16:26

## 2023-10-23 RX ADMIN — Medication 3 MG: at 21:12

## 2023-10-23 RX ADMIN — POTASSIUM CHLORIDE 10 MEQ: 1500 TABLET, EXTENDED RELEASE ORAL at 05:49

## 2023-10-23 ASSESSMENT — LIFESTYLE VARIABLES
ON A TYPICAL DAY WHEN YOU DRINK ALCOHOL HOW MANY DRINKS DO YOU HAVE: 0
TOTAL SCORE: 0
EVER HAD A DRINK FIRST THING IN THE MORNING TO STEADY YOUR NERVES TO GET RID OF A HANGOVER: NO
EVER FELT BAD OR GUILTY ABOUT YOUR DRINKING: NO
HAVE PEOPLE ANNOYED YOU BY CRITICIZING YOUR DRINKING: NO
TOTAL SCORE: 0
TOTAL SCORE: 0
HAVE YOU EVER FELT YOU SHOULD CUT DOWN ON YOUR DRINKING: NO
AVERAGE NUMBER OF DAYS PER WEEK YOU HAVE A DRINK CONTAINING ALCOHOL: 0
HOW MANY TIMES IN THE PAST YEAR HAVE YOU HAD 5 OR MORE DRINKS IN A DAY: 0
CONSUMPTION TOTAL: NEGATIVE
ALCOHOL_USE: NO

## 2023-10-23 ASSESSMENT — FIBROSIS 4 INDEX: FIB4 SCORE: 10.26

## 2023-10-23 ASSESSMENT — PAIN DESCRIPTION - PAIN TYPE: TYPE: ACUTE PAIN

## 2023-10-23 NOTE — NON-PROVIDER
FAMILY MEDICINE MEDICAL STUDENT PROGRESS NOTE          PATIENT ID:  NAME:  Trev Carl  MRN:               [id number]  YOB: 1935    Date of Admission: 10/17/2023     Attending: Traci Mix M.d.     STUDENT: Héctor Stinson [facility]    Primary Care Physician:  DAVID Leslie    HPI: Trev Carl is a 88 y.o. male admitted for AMS, possible UTI and sacral wound on hospital day 6. Patient presented to the ED on 10/17/2023 due to altered mental status.     Patient was recently hospitalized for Klebsiella pneumonia and ESBL bacteremia, with urosepsis, as he has a chronic graham catheter. Placed in ICU briefly and treated with pressors, meropenem and had improvement, then discharged back to SNF. Worsening sacral wound since that time.    Past medical history of atrial fibrillation, indwelling graham catheter, ESBL UTI/bacteremia. Previous urology work up for bilateral hydronephrosis included considerations for neurogenic bladder due to small capacity bladder with elevated residuals. Patient also received bilateral ureteral stents in 8/2023.     SUBJECTIVE:   No acute events overnight, patient denies any symptoms at first exam but endorses some back pain near sacral wound at second exam. Patient is alert and oriented to himself only, does not know date/month/year or location. Denies any other symptoms, including fever, chills, chest pain or shortness of breath.     OBJECTIVE:  Temp:  [36.5 °C (97.7 °F)-36.9 °C (98.4 °F)] 36.5 °C (97.7 °F)  Pulse:  [76-96] 76  Resp:  [16-17] 17  BP: ()/(49-67) 98/51  SpO2:  [92 %-95 %] 95 %      Intake/Output Summary (Last 24 hours) at 10/23/2023 1337  Last data filed at 10/23/2023 0600  Gross per 24 hour   Intake --   Output 2200 ml   Net -2200 ml       PHYSICAL EXAM:  Physical Exam  Constitutional:       General: He is not in acute distress.  Cardiovascular:      Rate and Rhythm: Normal rate. Rhythm irregular.      Comments: Heart sounds difficult to  "auscultate.  Pulmonary:      Effort: Pulmonary effort is normal.      Breath sounds: Normal breath sounds.   Abdominal:      General: Abdomen is flat.      Palpations: Abdomen is soft.   Musculoskeletal:      Comments: Sacral decubitus wound with wound vac. Wound vac contents are red/brown and thick.   Neurological:      General: No focal deficit present.      Mental Status: He is alert. He is disoriented.   Psychiatric:         Mood and Affect: Mood normal.         Behavior: Behavior normal.         LABS:  Recent Labs     10/21/23  0104 10/22/23  0120   WBC 5.4 7.9   RBC 2.74* 2.99*   HEMOGLOBIN 7.9* 8.5*   HEMATOCRIT 25.6* 27.2*   MCV 93.4 91.0   MCH 28.8 28.4   RDW 65.8* 63.2*   PLATELETCT 49* 56*   MPV 12.7 11.5     Recent Labs     10/21/23  0104 10/22/23  0120 10/23/23  0117   SODIUM 138 138 137   POTASSIUM 3.6 3.5* 3.7   CHLORIDE 110 108 107   CO2 24 23 22   BUN 20 19 23*   CREATININE 0.95 0.92 0.89   CALCIUM 7.9* 7.9* 7.9*   ALBUMIN 1.5*  --   --      Estimated GFR/CRCL = Estimated Creatinine Clearance: 65.7 mL/min (by C-G formula based on SCr of 0.89 mg/dL).  Recent Labs     10/21/23  0104 10/22/23  0120 10/23/23  0117   GLUCOSE 79 77 93     Recent Labs     10/21/23  0104   ASTSGOT 32   ALTSGPT 24   TBILIRUBIN 0.3   ALKPHOSPHAT 139*   GLOBULIN 2.6         IMAGING:  DX-CHEST-PORTABLE (1 VIEW)   Final Result      No radiographic evidence of acute cardiopulmonary disease.          CULTURES:   Results       Procedure Component Value Units Date/Time    Blood Culture [[id number]] Collected: 10/17/23 1440    Order Status: Completed Specimen: Blood from Peripheral Updated: 10/22/23 1701     Significant Indicator NEG     Source BLD     Site PERIPHERAL     Culture Result No growth after 5 days of incubation.    Narrative:      1 of 2 for Blood Culture x 2 sites order. Per Hospital  Policy: Only change Specimen Src: to \"Line\" if specified by  physician order.  Release to patient->Immediate  Right AC    Blood Culture " "[[id number]] Collected: 10/17/23 1413    Order Status: Completed Specimen: Blood from Peripheral Updated: 10/22/23 1500     Significant Indicator NEG     Source BLD     Site PERIPHERAL     Culture Result No growth after 5 days of incubation.    Narrative:      2 of 2 blood culture x2  Sites order. Per Hospital Policy:  Only change Specimen Src: to \"Line\" if specified by physician  order.  Release to patient->Immediate  No site indicated    URINE CULTURE(NEW) [[id number]]  (Abnormal)  (Susceptibility) Collected: 10/17/23 1559    Order Status: Completed Specimen: Urine, Clean Catch Updated: 10/19/23 1110     Significant Indicator POS     Source UR     Site URINE, CLEAN CATCH     Culture Result -      Klebsiella pneumoniae ESBL  >100,000 cfu/mL  Extended Spectrum Beta-lactamase (ESBL) isolated.  ESBL's may be clinically resistant to therapy with  Penicillins,Cephalosporins or Aztreonam despite  apparent in vitro susceptibility to some of these agents.  The patient requires contact isolation.  Please contact pharmacy or an Infectious Disease Specialist  if you have any questions about appropriate therapy.      Narrative:      Indication for culture:->Acute unexplained altered mental  status ONLY after ruling out other recognized cause  Release to patient->Immediate  Indication for culture:->Acute unexplained altered mental    Susceptibility       Klebsiella pneumoniae esbl (1)       Antibiotic Interpretation Microscan   Method Status    Amoxicillin/CA Intermediate 16/8 mcg/mL LUCIANO Final    Ceftriaxone Resistant >32 mcg/mL LUCIANO Final    Cefazolin Resistant >16 mcg/mL LUCIANO Final     Breakpoints when Cefazolin is used for therapy of infections  other than uncomplicated UTIs due to Enterobacterales are as  follows:  LUCIANO and Interpretation:  <=2 S  4 I  >=8 R         Ciprofloxacin Resistant >2 mcg/mL LUCIANO Final    Cefepime Resistant >16 mcg/mL LUCIANO Final    Cefuroxime Resistant >16 mcg/mL LUCIANO Final    Ampicillin/sulbactam Resistant " >16/8 mcg/mL LUCIANO Final    Tobramycin Resistant >8 mcg/mL LUCIANO Final    Nitrofurantoin Resistant >64 mcg/mL LUCIANO Final    Gentamicin Resistant >8 mcg/mL LUCIANO Final    Levofloxacin Sensitive <=0.5 mcg/mL LUCIANO Final    Minocycline Resistant >8 mcg/mL LUCIANO Final    Pip/Tazobactam Sensitive 16 mcg/mL LUCIANO Final    Trimeth/Sulfa Resistant >2/38 mcg/mL LUCIANO Final    Tigecycline Sensitive <=2 mcg/mL LUCIANO Final                       URINALYSIS [[id number]]  (Abnormal) Collected: 10/17/23 1554    Order Status: Completed Specimen: Urine Updated: 10/17/23 1651     Color DK Yellow     Character Turbid     Specific Gravity 1.019     Ph 6.0     Glucose Negative mg/dL      Ketones Trace mg/dL      Protein 300 mg/dL      Bilirubin Small     Urobilinogen, Urine 1.0     Nitrite Positive     Leukocyte Esterase Large     Occult Blood Large     Micro Urine Req Microscopic    Narrative:      Indication for culture:->Acute unexplained altered mental  status ONLY after ruling out other recognized cause  Release to patient->Immediate            MEDS:  Current Facility-Administered Medications   Medication Last Admin    dakins 0.125% (1/4 strength) topical soln      miconazole (Micotin) 2 % cream Given at 10/22/23 1738    minocycline (Minocin) capsule 100 mg 100 mg at 10/23/23 0550    polyethylene glycol/lytes (Miralax) PACKET 1 Packet      And    magnesium hydroxide (Milk Of Magnesia) suspension 30 mL      And    bisacodyl (Dulcolax) suppository 10 mg      labetalol (Normodyne/Trandate) injection 10 mg      acetaminophen (Tylenol) tablet 650 mg 650 mg at 10/18/23 1215    melatonin tablet 3 mg 3 mg at 10/22/23 2101    memantine (Namenda) tablet 5 mg 5 mg at 10/23/23 0549    omeprazole (PriLOSEC) capsule 20 mg 20 mg at 10/23/23 0550    potassium chloride SA (Kdur) tablet 10 mEq 10 mEq at 10/23/23 0549    senna-docusate (Pericolace Or Senokot S) 8.6-50 MG per tablet 1 Tablet 1 Tablet at 10/23/23 0559    tamsulosin (Flomax) capsule 0.4 mg 0.4 mg at  10/22/23 4512       ASSESSMENT/PLAN:  88 y.o. male admitted for AMS, possible UTI and sacral wound.     * Complicated UTI (urinary tract infection), likely colonized indwelling Granger - (present on admission)  Assessment & Plan  Patient presents with altered mental status and UA positive for UTI.  No leukocytosis, patient does not appear septic.  Lactic acid was elevated at 2.5, repeat was 2.7.  He does have indwelling Granger catheter and frequent UTIs.  Last month, patient had Klebsiella ESBL UTI and bacteremia.  Lactic stable, HD stable, no leukocytosis, Urine culture grew klebsiella similar to prior hospitalization. 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     - abx were discontinued as UTI is unlikely given no leukocytosis, ID pharm agreed      Edema  Assessment & Plan  Patient on 10/22 had pitting edema in his lower extremities and some edema in his upper extremities.  Lungs were still clear to auscultation.  - Discontinued maintenance fluids  - Lasix 20 mg p.o. once, will continue to monitor I's and O's and reassess for additional doses     Sacral decubitus ulcer  Assessment & Plan  Patient has history of sacral decubitus ulcer.  He resides in skilled nursing facility. Wound large and deep, although does not have infectious picture at this time  -agree w/ ID pharm discussion on d/c abx at this time and restarting if s/s of infection arise  -s/p OR for debridement, recommendations of wound vac and wound care per surgery   - Wound care with wound VAC in place     AMS (altered mental status)  Assessment & Plan  Patient has altered mental status reported at admission, however per chart review patient is often AxOx2 or AxOx1. He is currently not showing signs of infection.  Patient has been a times O x1 since admission without any fluctuations in his mental status.  - Continue to monitor  - Palliative consult planned for today, they will reach out to grandson     Atrial fibrillation (HCC)- (present on  admission)  Assessment & Plan  Patient has history of A-fib, anticoagulated.    - Restart rivaroxaban, which was stopped during hospitalization  - Telemetry monitoring     Anemia- (present on admission)  Assessment & Plan  Normocytic.  Patient has chronic anemia, currently at his baseline at 10.  Likely anemia of chronic disease or combined picture.  - Iron/TIBC were low   - Order serum ferritin to assess if iron supplements will be helpful      CODE Status: DNAR/DNI      Disposition  Patient is inpatient, awaiting palliative consult and conversation with family to discuss.      Héctor Stinson, MSIII  [employer]

## 2023-10-23 NOTE — DISCHARGE PLANNING
note:  Received a message from [facility] that [facility] are not able to accept pts with woundvac so woundcare would need to transition to a dressing which will be managed by hospice.     Manually faxed referral as [facility] is unable to see referral in [facility].

## 2023-10-23 NOTE — CONSULTS
PALLIATIVE CARE SOCIAL WORK CONSULT NOTE    Patient: Trev Carl  Age: 88  Gender: Male  MRN: [id number]  Insurance: [employer]  Date Admitted: 10/17/23  Date of Service: 1023/23  Medical History: a-fib, sacral decubitus ulcer    History of Present illness: Patient was transferred to ER on 10/17 from [facility] for ALOC. He was found to have a UTI. His wound was debrided on 10/19. He currently has a wound vac.     LMSW met with patient. LMSW introduced self and explained role of palliative care. Patient was able to confirm his name but did not know where he was. He denied current pain. He was not able to answer questions. Will call mare/Bro FARAH.    LMSW called Bro and introduced self. He reports patient has been in and out of the hospital and SNF for about 20 weeks. He is paying for [employer], [facility], although patient has only resided there for one week before his medical decline. We discussed patient's advance directive and grandson feels transitioning to comfort focused care while in the hospital will align with patient's wishes. He has spoken with patient's long time friends and feels comfortable with this difficult decision. LMSW provided emotional support throughout phone call. We discussed option of community hospice in the group home. Grandson was agreeable. Updated care team. LMSW requested referrals for comfort care and hospice from primary team. Will assist with POLST completion.     Fina Becker LMSW  Palliative Care

## 2023-10-23 NOTE — DISCHARGE PLANNING
Case Management Discharge Planning    Admission Date: 10/17/2023  GMLOS: 2.9  ALOS: 6    6-Clicks ADL Score: 11  6-Clicks Mobility Score: 9        Anticipated Discharge Dispo: Discharge Disposition:HOSPICE CARE    DME Needed: none at this time.     Action(s) Taken: Received a message from Fina Becker Palliative Care SW to get hospice choice. Message sent to resident to place hospice referral order.     CM called mare Crabtree and he does not have a preference for the hospice company. Any that would accept pt's insurance. Ever works with [facility]. And CM confirmed they accept [employer] Medicare so choice made and faxed to [facility].     Escalations Completed: n/a    Medically Clear: yes for hospice and back to     Next Steps: follow up with hospice and .     Barriers to Discharge: pending hospice acceptance.     Is the patient up for discharge tomorrow: tomorrow.

## 2023-10-23 NOTE — PROGRESS NOTES
Assumed care of patient at bedside report from daytime RN. Updated on POC. Patient currently A & O x 1; on RA; bed bound; without complaints of acute pain. Call light within reach. Whiteboard updated. Fall precautions in place. Bed locked and in lowest position. All questions answered. No other needs indicated at this time.   Patient in bed laying I left side.

## 2023-10-23 NOTE — DIETARY
Nutrition Update:    Day 6 of admit.  Trev Carl is an 88 y.o. male with admitting DX of Complicated UTI (urinary tract infection)    Patient being followed to optimize nutrition.    Current Diet: Cardiac with oral nutrition supplements    Problem: Nutritional:  Goal: Achieve adequate nutritional intake  Description: Patient will consume 50% of meals  Outcome: Not progressing     RD following pt for adequate nutrition.  RD visited pt at bedside 10/18 to discuss adequate nutrition for optimal wound healing.  PO per ADL flow sheet has been <25% of recent meals and ~50% of oral nutrition supplement.   Pt is s/p debridement of skin, subcutaneous tissue and muscle 10/19.  Pt continues to be only oriented to person.  Pt with 2+ edema to right and left upper extremities; 2+ pitting edema to right and left lower extremities.  Pt to continue to receive Rick BID and Boost Plus TID to bolster nutrition.   Palliative consult pending. Current Advance Directive on file states pt does not desire artificial nutrition.     At this time, pt meets criteria for severe, acute malnutrition related to diminished PO intake 2' altered mental status, with increased metabolic needs from chronic wound, AEB <50% of estimated energy requirements >5 days and moderate fluid accumulation of all extremities listed above.     RD will continue to follow.

## 2023-10-23 NOTE — PROGRESS NOTES
[facility]        Attending:   Traci Mix MD    Resident:   Ariana Garsia D.O.    PATIENT:   Trev Carl; [id number]; 1935    ID:   88 y.o. male admitted 10/17 for AMS with pertinent PMHx of Klebsiella ESBL 1 month prior.  Currently oriented back to baseline.  Has concurrent sacral wound, waiting for placement.      INTERVAL: Pt remains at baseline orientation of AAOx1-2.  Palliative care met with pt and spoke with grandson - decision was made to transition to hospice care.      SUBJECTIVE:    Pt not able to answer what his name is or where he is but is able to state that his right hip hurts.      OBJECTIVE:  Vitals:    10/22/23 0343 10/22/23 0657 10/22/23 1458 10/22/23 2349   BP:  108/70 91/67 100/49   Pulse:  99 78 96   Resp:  18 17 16   Temp:  36.4 °C (97.5 °F) 36.9 °C (98.4 °F) 36.6 °C (97.9 °F)   TempSrc:  Temporal Temporal Temporal   SpO2:  94% 92% 94%   Weight: 93.7 kg (206 lb 9.1 oz)      Height:           Intake/Output Summary (Last 24 hours) at 10/23/2023 0520  Last data filed at 10/22/2023 1400  Gross per 24 hour   Intake --   Output 1950 ml   Net -1950 ml       PHYSICAL EXAM:  General: No acute distress, afebrile, resting comfortably.  AAOx2  HEENT: NC/AT. EOMI.   Cardiovascular: RRR without murmurs. Normal capillary refill   Respiratory: CTAB  Abdomen: soft, nontender, midly distended, no masses  EXT:  OLVERA, no edema, R hip mildly tender to palpation.   Skin: wound vac over sacral wound.    : urinary catheter in place, no infection around insertion, draining yellow urine with minimal sediment.   Neuro: Non-focal.  Moves UEs.      LABS:  Recent Labs     10/20/23  0920 10/21/23  0104 10/22/23  0120   WBC 5.0 5.4 7.9   RBC 2.73* 2.74* 2.99*   HEMOGLOBIN 8.0* 7.9* 8.5*   HEMATOCRIT 24.5* 25.6* 27.2*   MCV 89.7 93.4 91.0   MCH 29.3 28.8 28.4   RDW 62.4* 65.8* 63.2*   PLATELETCT 52* 49* 56*   MPV 10.9 12.7 11.5   NEUTSPOLYS 56.50  --   --    LYMPHOCYTES 35.10  --   --   "  MONOCYTES 6.20  --   --    EOSINOPHILS 0.80  --   --    BASOPHILS 0.40  --   --      Recent Labs     10/20/23  0920 10/21/23  0104 10/22/23  0120 10/23/23  0117   SODIUM 138 138 138 137   POTASSIUM 3.8 3.6 3.5* 3.7   CHLORIDE 109 110 108 107   CO2 21 24 23 22   BUN 23* 20 19 23*   CREATININE 1.06 0.95 0.92 0.89   CALCIUM 7.7* 7.9* 7.9* 7.9*   ALBUMIN 1.7* 1.5*  --   --      Estimated GFR/CRCL = Estimated Creatinine Clearance: 66 mL/min (by C-G formula based on SCr of 0.89 mg/dL).  Recent Labs     10/21/23  0104 10/22/23  0120 10/23/23  0117   GLUCOSE 79 77 93     Recent Labs     10/20/23  0920 10/21/23  0104   ASTSGOT 24 32   ALTSGPT 22 24   TBILIRUBIN 0.3 0.3   ALKPHOSPHAT 130* 139*   GLOBULIN 2.4 2.6             No results for input(s): \"INR\", \"APTT\", \"FIBRINOGEN\" in the last 72 hours.    Invalid input(s): \"DIMER\"      IMAGING:  DX-CHEST-PORTABLE (1 VIEW)   Final Result      No radiographic evidence of acute cardiopulmonary disease.          MEDS:  Current Facility-Administered Medications   Medication Last Admin    dakins 0.125% (1/4 strength) topical soln      miconazole (Micotin) 2 % cream Given at 10/22/23 1738    minocycline (Minocin) capsule 100 mg 100 mg at 10/22/23 1738    polyethylene glycol/lytes (Miralax) PACKET 1 Packet      And    magnesium hydroxide (Milk Of Magnesia) suspension 30 mL      And    bisacodyl (Dulcolax) suppository 10 mg      labetalol (Normodyne/Trandate) injection 10 mg      acetaminophen (Tylenol) tablet 650 mg 650 mg at 10/18/23 1215    melatonin tablet 3 mg 3 mg at 10/22/23 2101    memantine (Namenda) tablet 5 mg 5 mg at 10/22/23 0549    omeprazole (PriLOSEC) capsule 20 mg 20 mg at 10/22/23 0548    potassium chloride SA (Kdur) tablet 10 mEq 10 mEq at 10/22/23 0548    senna-docusate (Pericolace Or Senokot S) 8.6-50 MG per tablet 1 Tablet 1 Tablet at 10/22/23 2101    tamsulosin (Flomax) capsule 0.4 mg 0.4 mg at 10/22/23 0766       ASSESSMENT/PLAN:  88M with PMHx of Afib (anticoag), " indwelling graham, and ESBL UTI/bacteremia last month, admitted 10/17 with AMS.  Patient determined to likely be colonized with ESBL Klebsiella, this was reverted back to prophylactic minocycline.  Patient put on hospice on 10/23.    * Complicated UTI (urinary tract infection), likely colonized indwelling Graham - (present on admission)  Assessment & Plan  Patient presented with altered mental status and UA positive for UTI.  Patient did not appear septic. Urine culture grew klebsiella similar to prior hospitalization. Likely colonization of chronic indwelling Graham.  -Currently on prophylactic minocycline, will address tomorrow with hospice if this should be continued or not.     Edema  Assessment & Plan  Patient on 10/22 had pitting edema in his lower extremities and some edema in his upper extremities.  Lungs were still clear to auscultation. Discontinued maintenance fluids  - Lasix 20 mg p.o. again, will continue to monitor I's and O's and reassess for additional doses     Sacral decubitus ulcer  Assessment & Plan  Patient has history of sacral decubitus ulcer.  He came from skilled nursing facility. Wound large and deep, although does not have infectious picture at this time  -s/p OR for debridement  - Wound care with wound VAC in place     AMS (altered mental status)  Assessment & Plan  Patient had altered mental status reported at admission, however per chart review patient is often AxOx2 or AxOx1. He is currently not showing signs of infection.  Patient has been a times O x1 since admission without any fluctuations in his mental status.  - Continue to monitor  -Decision to put on hospice 10/23 with grandson who was in contact with other close family members.     Atrial fibrillation (HCC)- (present on admission)  Assessment & Plan  Patient has history of A-fib, anticoagulated.  We will continue his home Xarelto.  -Continue home Xarelto-we will address continuation with hospice tomorrow  -Telemetry monitoring      Anemia- (present on admission)  Assessment & Plan  Normocytic.  Patient has chronic anemia, currently at his baseline at 10.  Likely anemia of chronic disease or combined picture.  -Iron/TIBC were low       Core Measures:  Fluids: Held  Lines: PIV, in-dwelling graham   Abx: minocycline PO and mupirocin topical  Diet: Cardiac  PPX: Xarelto      CODE STATUS: DNR/DNI    Dispo: Plan to DC back to SNF on hospice.    Ariana Garsia D.O.  PGY-2  [facility]

## 2023-10-23 NOTE — DISCHARGE PLANNING
Case Management Discharge Planning    Admission Date: 10/17/2023  GMLOS: 2.9  ALOS: 6    6-Clicks ADL Score: 11  6-Clicks Mobility Score: 9  PT and/or OT Eval ordered: No  Post-acute Referrals Ordered: Yes  Post-acute Choice Obtained: No  Has referral(s) been sent to post-acute provider:  Yes      Anticipated Discharge Dispo: Discharge Disposition: D/T to SNF with Medicare cert in anticipation of skilled care (03)    DME Needed: No    Action(s) Taken: Updated Provider/Nurse on Discharge Plan, Patient Conference, and Referral(s) sent  CM reviewed chart and attended IDT rounds.  Pt will not be returning to [place] and it was requested that we send more referrals.  This was done by CM    Escalations Completed: None    Medically Clear: No    Next Steps: Wound care; pending palliative consult; SNF acceptance    Barriers to Discharge: Medical clearance and Pending Placement

## 2023-10-24 VITALS
TEMPERATURE: 97 F | DIASTOLIC BLOOD PRESSURE: 57 MMHG | OXYGEN SATURATION: 97 % | HEART RATE: 70 BPM | RESPIRATION RATE: 16 BRPM | BODY MASS INDEX: 29.51 KG/M2 | HEIGHT: 70 IN | WEIGHT: 206.13 LBS | SYSTOLIC BLOOD PRESSURE: 99 MMHG

## 2023-10-24 PROCEDURE — 700102 HCHG RX REV CODE 250 W/ 637 OVERRIDE(OP): Performed by: BEHAVIOR ANALYST

## 2023-10-24 PROCEDURE — 700102 HCHG RX REV CODE 250 W/ 637 OVERRIDE(OP)

## 2023-10-24 PROCEDURE — 99238 HOSP IP/OBS DSCHRG MGMT 30/<: CPT | Mod: GC | Performed by: FAMILY MEDICINE

## 2023-10-24 PROCEDURE — A9270 NON-COVERED ITEM OR SERVICE: HCPCS

## 2023-10-24 PROCEDURE — A9270 NON-COVERED ITEM OR SERVICE: HCPCS | Performed by: BEHAVIOR ANALYST

## 2023-10-24 RX ORDER — GLYCOPYRROLATE 0.2 MG/ML
0.2 INJECTION INTRAMUSCULAR; INTRAVENOUS 3 TIMES DAILY PRN
Status: DISCONTINUED | OUTPATIENT
Start: 2023-10-24 | End: 2023-10-24 | Stop reason: HOSPADM

## 2023-10-24 RX ORDER — GLYCOPYRROLATE 1 MG/1
1 TABLET ORAL 3 TIMES DAILY PRN
Qty: 90 TABLET | Refills: 0 | Status: SHIPPED | OUTPATIENT
Start: 2023-10-24

## 2023-10-24 RX ORDER — POLYETHYLENE GLYCOL 3350 17 G/17G
17 POWDER, FOR SOLUTION ORAL
Refills: 3 | Status: CANCELLED | COMMUNITY
Start: 2023-10-24

## 2023-10-24 RX ORDER — ACETAMINOPHEN 325 MG/1
650 TABLET ORAL EVERY 4 HOURS PRN
Qty: 30 TABLET | Refills: 0 | COMMUNITY
Start: 2023-10-24

## 2023-10-24 RX ORDER — MORPHINE SULFATE 100 MG/5ML
10 SOLUTION ORAL
Qty: 30 ML | Refills: 0 | Status: SHIPPED | OUTPATIENT
Start: 2023-10-24 | End: 2023-10-31

## 2023-10-24 RX ORDER — ONDANSETRON 2 MG/ML
8 INJECTION INTRAMUSCULAR; INTRAVENOUS EVERY 8 HOURS PRN
Status: DISCONTINUED | OUTPATIENT
Start: 2023-10-24 | End: 2023-10-24 | Stop reason: HOSPADM

## 2023-10-24 RX ORDER — BISACODYL 10 MG
10 SUPPOSITORY, RECTAL RECTAL
Qty: 10 SUPPOSITORY | Refills: 0 | Status: SHIPPED | OUTPATIENT
Start: 2023-10-24

## 2023-10-24 RX ORDER — CARBOXYMETHYLCELLULOSE SODIUM 5 MG/ML
1 SOLUTION/ DROPS OPHTHALMIC PRN
Qty: 15 ML | Refills: 2 | Status: SHIPPED | OUTPATIENT
Start: 2023-10-24

## 2023-10-24 RX ORDER — ACETAMINOPHEN 325 MG/1
650 TABLET ORAL EVERY 6 HOURS PRN
Qty: 30 TABLET | Refills: 0 | Status: CANCELLED | COMMUNITY
Start: 2023-10-24

## 2023-10-24 RX ORDER — ONDANSETRON 8 MG/1
8 TABLET, ORALLY DISINTEGRATING ORAL EVERY 8 HOURS PRN
Qty: 15 TABLET | Refills: 0 | Status: SHIPPED | OUTPATIENT
Start: 2023-10-24

## 2023-10-24 RX ORDER — LORAZEPAM 2 MG/ML
1 CONCENTRATE ORAL
Status: DISCONTINUED | OUTPATIENT
Start: 2023-10-24 | End: 2023-10-24 | Stop reason: HOSPADM

## 2023-10-24 RX ORDER — ONDANSETRON 8 MG/1
8 TABLET, ORALLY DISINTEGRATING ORAL EVERY 8 HOURS PRN
Status: DISCONTINUED | OUTPATIENT
Start: 2023-10-24 | End: 2023-10-24 | Stop reason: HOSPADM

## 2023-10-24 RX ORDER — MORPHINE SULFATE 100 MG/5ML
10 SOLUTION ORAL
Status: DISCONTINUED | OUTPATIENT
Start: 2023-10-24 | End: 2023-10-24 | Stop reason: HOSPADM

## 2023-10-24 RX ORDER — MORPHINE SULFATE 100 MG/5ML
20 SOLUTION ORAL
Status: DISCONTINUED | OUTPATIENT
Start: 2023-10-24 | End: 2023-10-24 | Stop reason: HOSPADM

## 2023-10-24 RX ORDER — BISACODYL 10 MG
10 SUPPOSITORY, RECTAL RECTAL
Status: DISCONTINUED | OUTPATIENT
Start: 2023-10-24 | End: 2023-10-24 | Stop reason: HOSPADM

## 2023-10-24 RX ORDER — GLYCOPYRROLATE 1 MG/1
1 TABLET ORAL 3 TIMES DAILY PRN
Status: DISCONTINUED | OUTPATIENT
Start: 2023-10-24 | End: 2023-10-24 | Stop reason: HOSPADM

## 2023-10-24 RX ORDER — ACETAMINOPHEN 650 MG/1
650 SUPPOSITORY RECTAL EVERY 4 HOURS PRN
Status: DISCONTINUED | OUTPATIENT
Start: 2023-10-24 | End: 2023-10-24 | Stop reason: HOSPADM

## 2023-10-24 RX ORDER — LORAZEPAM 2 MG/ML
1 INJECTION INTRAMUSCULAR
Status: DISCONTINUED | OUTPATIENT
Start: 2023-10-24 | End: 2023-10-24 | Stop reason: HOSPADM

## 2023-10-24 RX ORDER — CARBOXYMETHYLCELLULOSE SODIUM 5 MG/ML
1 SOLUTION/ DROPS OPHTHALMIC PRN
Status: DISCONTINUED | OUTPATIENT
Start: 2023-10-24 | End: 2023-10-24 | Stop reason: HOSPADM

## 2023-10-24 RX ORDER — LACTULOSE 20 G/30ML
10 SOLUTION ORAL
Status: DISCONTINUED | OUTPATIENT
Start: 2023-10-24 | End: 2023-10-24 | Stop reason: HOSPADM

## 2023-10-24 RX ORDER — ACETAMINOPHEN 325 MG/1
650 TABLET ORAL EVERY 4 HOURS PRN
Status: DISCONTINUED | OUTPATIENT
Start: 2023-10-24 | End: 2023-10-24 | Stop reason: HOSPADM

## 2023-10-24 RX ORDER — LORAZEPAM 2 MG/ML
1 CONCENTRATE ORAL
Qty: 30 ML | Refills: 0 | Status: SHIPPED | OUTPATIENT
Start: 2023-10-24 | End: 2023-11-23

## 2023-10-24 RX ORDER — ATROPINE SULFATE 10 MG/ML
2 SOLUTION/ DROPS OPHTHALMIC EVERY 4 HOURS PRN
Status: DISCONTINUED | OUTPATIENT
Start: 2023-10-24 | End: 2023-10-24 | Stop reason: HOSPADM

## 2023-10-24 RX ORDER — ATROPINE SULFATE 10 MG/ML
2 SOLUTION/ DROPS OPHTHALMIC EVERY 4 HOURS PRN
Qty: 15 ML | Refills: 2 | Status: SHIPPED | OUTPATIENT
Start: 2023-10-24

## 2023-10-24 RX ORDER — BISACODYL 10 MG
10 SUPPOSITORY, RECTAL RECTAL
Refills: 0 | Status: CANCELLED | OUTPATIENT
Start: 2023-10-24

## 2023-10-24 RX ADMIN — MINOCYCLINE HYDROCHLORIDE 100 MG: 50 CAPSULE ORAL at 04:45

## 2023-10-24 RX ADMIN — POTASSIUM CHLORIDE 10 MEQ: 1500 TABLET, EXTENDED RELEASE ORAL at 04:45

## 2023-10-24 RX ADMIN — SENNOSIDES AND DOCUSATE SODIUM 1 TABLET: 50; 8.6 TABLET ORAL at 05:54

## 2023-10-24 RX ADMIN — MORPHINE SULFATE 20 MG: 100 SOLUTION ORAL at 15:57

## 2023-10-24 RX ADMIN — MEMANTINE HYDROCHLORIDE 5 MG: 10 TABLET ORAL at 04:45

## 2023-10-24 RX ADMIN — MORPHINE SULFATE 10 MG: 100 SOLUTION ORAL at 12:30

## 2023-10-24 RX ADMIN — OMEPRAZOLE 20 MG: 20 CAPSULE, DELAYED RELEASE ORAL at 04:45

## 2023-10-24 ASSESSMENT — PAIN DESCRIPTION - PAIN TYPE
TYPE: ACUTE PAIN
TYPE: ACUTE PAIN

## 2023-10-24 ASSESSMENT — FIBROSIS 4 INDEX: FIB4 SCORE: 10.26

## 2023-10-24 NOTE — DOCUMENTATION QUERY
[facility]                                                                       Query Response Note      PATIENT:               TOOTIE DOWNING  ACCT #:                  [id number]  MRN:                     [id number]  :                      1935  ADMIT DATE:       10/17/2023 1:42 PM  DISCH DATE:          RESPONDING  PROVIDER #:        [id number]           QUERY TEXT:    Per Registered Dietician Assessment findings patient meets ASPEN (presence of at least two) for severe, acute malnutrition.      -  diminished PO intake 2' altered mental status, with increased metabolic needs from chronic wound, AEB <50% of estimated energy requirements >5 days and moderate fluid accumulation  of all extremities   - PO per ADL flow sheet has been <25% of recent meals and ~50% of oral nutrition supplement.  - Pt with 2+ edema to right and left upper extremities; 2+ pitting edema to right and left lower extremities    Please clarify the nutritional status based upon the clinical indicators and treatments      The patient's Clinical Indicators include:  - Findings:  Registered Dietician evaluation 10/23:  At this time, pt meets criteria for severe, acute malnutrition related to diminished PO intake 2' altered mental status, with increased metabolic needs from chronic wound, AEB <50% of estimated energy requirements >5 days and moderate fluid accumulation  of all extremities  - Wt: 80.1 kg via bed scale - 10/18/23    - Treatments:  regular diet, double protein portions with all meals, LAWSON and Rick BID, monitor weight, palliative consult     - Risk factors:  UTI with chronic indwelling urinary catheter, altered mental status, sacral pressure ulcer stage 4, edema    Thank You,  Charley Wong, RN  Clinical Documentation   [email or web address]  Connect via [email or web address]  Options provided:   -- Malnutrition, Severe Protein Calorie   -- Malnutrition,  Moderate Protein Calorie   -- Malnutrition, Mild Protein Calorie   -- Insignificant Findings   -- Other explanation, (please specify other explanation)      Query created by: Charley Wong on 10/24/2023 6:22 AM    RESPONSE TEXT:    Malnutrition, Severe Protein Calorie          Electronically signed by:  FRANCISCO WHEAT MD 10/24/2023 8:54 AM

## 2023-10-24 NOTE — CARE PLAN
The patient is Stable - Low risk of patient condition declining or worsening    Shift Goals  Clinical Goals: maintain wound vac with dakins, q2 turns, palliative consult  Patient Goals: MARIVEL  Family Goals: n/a    Progress made toward(s) clinical / shift goals:  wound vac with dakins continued, q2 turns maintained, palliative consult complete, plan to discharge back to  with hospice    Patient is not progressing towards the following goals:      Problem: Knowledge Deficit - Standard  Goal: Patient and family/care givers will demonstrate understanding of plan of care, disease process/condition, diagnostic tests and medications  Outcome: Progressing  Note: Family updated on POC, goal is to discharge back to  with hospice     Problem: Pain - Standard  Goal: Alleviation of pain or a reduction in pain to the patient’s comfort goal  Outcome: Progressing     Problem: Fall Risk  Goal: Patient will remain free from falls  Outcome: Progressing  Note: Patient remains free from falls

## 2023-10-24 NOTE — NON-PROVIDER
FAMILY MEDICINE MEDICAL STUDENT PROGRESS NOTE          PATIENT ID:  NAME:  Trev Carl  MRN:               [id number]  YOB: 1935    Date of Admission: 10/17/2023     Attending: Traci Mix M.d.     STUDENT: Héctor Stinson [facility]    Primary Care Physician:  DAVID Leslie    HPI: Trev Carl is a 88 y.o. male admitted for AMS, possible UTI and sacral wound  on hospital day 7.    Patient was recently hospitalized for Klebsiella pneumonia and ESBL bacteremia, with urosepsis, as he has a chronic graham catheter. Placed in ICU briefly and treated with pressors, meropenem and had improvement, then discharged back to SNF. Worsening sacral wound since that time.     Past medical history of atrial fibrillation, indwelling graham catheter, ESBL UTI/bacteremia. Previous urology work up for bilateral hydronephrosis included considerations for neurogenic bladder due to small capacity bladder with elevated residuals. Patient also received bilateral ureteral stents in 8/2023.     SUBJECTIVE:   No acute events overnight, patient denies any new symptoms. Patient is alert and oriented to himself only, does not know date/month/year or location. Denies any other symptoms, including fever, chills, chest pain or shortness of breath.     Palliative care met with patient and spoke with grandson, decision made to transition to hospice care.    OBJECTIVE:  Temp:  [36.2 °C (97.2 °F)-36.7 °C (98 °F)] 36.6 °C (97.9 °F)  Pulse:  [72-92] 72  Resp:  [15-16] 16  BP: ()/(56-68) 106/68  SpO2:  [96 %-97 %] 96 %      Intake/Output Summary (Last 24 hours) at 10/24/2023 0807  Last data filed at 10/24/2023 0400  Gross per 24 hour   Intake --   Output 1100 ml   Net -1100 ml       PHYSICAL EXAM:  Physical Exam  Constitutional:       General: He is not in acute distress.  Cardiovascular:      Rate and Rhythm: Normal rate. Rhythm irregular.   Pulmonary:      Effort: Pulmonary effort is normal. No respiratory distress.       "Breath sounds: Normal breath sounds.   Abdominal:      General: Abdomen is flat.      Palpations: Abdomen is soft.   Musculoskeletal:      Comments: Sacral decubitus wound with wound vac. Wound vac contents are red/brown.   Neurological:      General: No focal deficit present.      Mental Status: He is disoriented.   Psychiatric:         Mood and Affect: Mood normal.         LABS:  Recent Labs     10/22/23  0120   WBC 7.9   RBC 2.99*   HEMOGLOBIN 8.5*   HEMATOCRIT 27.2*   MCV 91.0   MCH 28.4   RDW 63.2*   PLATELETCT 56*   MPV 11.5     Recent Labs     10/22/23  0120 10/23/23  0117   SODIUM 138 137   POTASSIUM 3.5* 3.7   CHLORIDE 108 107   CO2 23 22   BUN 19 23*   CREATININE 0.92 0.89   CALCIUM 7.9* 7.9*     Estimated GFR/CRCL = Estimated Creatinine Clearance: 65.9 mL/min (by C-G formula based on SCr of 0.89 mg/dL).  Recent Labs     10/22/23  0120 10/23/23  0117   GLUCOSE 77 93                 No results for input(s): \"INR\", \"APTT\", \"FIBRINOGEN\" in the last 72 hours.    Invalid input(s): \"DIMER\"      IMAGING:  DX-CHEST-PORTABLE (1 VIEW)   Final Result      No radiographic evidence of acute cardiopulmonary disease.          CULTURES:   Results       Procedure Component Value Units Date/Time    Blood Culture [[id number]] Collected: 10/17/23 1440    Order Status: Completed Specimen: Blood from Peripheral Updated: 10/22/23 1701     Significant Indicator NEG     Source BLD     Site PERIPHERAL     Culture Result No growth after 5 days of incubation.    Narrative:      1 of 2 for Blood Culture x 2 sites order. Per Hospital  Policy: Only change Specimen Src: to \"Line\" if specified by  physician order.  Release to patient->Immediate  Right AC    Blood Culture [[id number]] Collected: 10/17/23 1413    Order Status: Completed Specimen: Blood from Peripheral Updated: 10/22/23 1500     Significant Indicator NEG     Source BLD     Site PERIPHERAL     Culture Result No growth after 5 days of incubation.    Narrative:      2 of 2 blood " "culture x2  Sites order. Per Hospital Policy:  Only change Specimen Src: to \"Line\" if specified by physician  order.  Release to patient->Immediate  No site indicated    URINE CULTURE(NEW) [[id number]]  (Abnormal)  (Susceptibility) Collected: 10/17/23 1559    Order Status: Completed Specimen: Urine, Clean Catch Updated: 10/19/23 1110     Significant Indicator POS     Source UR     Site URINE, CLEAN CATCH     Culture Result -      Klebsiella pneumoniae ESBL  >100,000 cfu/mL  Extended Spectrum Beta-lactamase (ESBL) isolated.  ESBL's may be clinically resistant to therapy with  Penicillins,Cephalosporins or Aztreonam despite  apparent in vitro susceptibility to some of these agents.  The patient requires contact isolation.  Please contact pharmacy or an Infectious Disease Specialist  if you have any questions about appropriate therapy.      Narrative:      Indication for culture:->Acute unexplained altered mental  status ONLY after ruling out other recognized cause  Release to patient->Immediate  Indication for culture:->Acute unexplained altered mental    Susceptibility       Klebsiella pneumoniae esbl (1)       Antibiotic Interpretation Microscan   Method Status    Amoxicillin/CA Intermediate 16/8 mcg/mL LUCIANO Final    Ceftriaxone Resistant >32 mcg/mL LUCIANO Final    Cefazolin Resistant >16 mcg/mL LUCIANO Final     Breakpoints when Cefazolin is used for therapy of infections  other than uncomplicated UTIs due to Enterobacterales are as  follows:  LUCIANO and Interpretation:  <=2 S  4 I  >=8 R         Ciprofloxacin Resistant >2 mcg/mL LUCIANO Final    Cefepime Resistant >16 mcg/mL LUCIANO Final    Cefuroxime Resistant >16 mcg/mL LUCIANO Final    Ampicillin/sulbactam Resistant >16/8 mcg/mL LUCIANO Final    Tobramycin Resistant >8 mcg/mL LUCIANO Final    Nitrofurantoin Resistant >64 mcg/mL LUCIANO Final    Gentamicin Resistant >8 mcg/mL LUCIANO Final    Levofloxacin Sensitive <=0.5 mcg/mL ULCIANO Final    Minocycline Resistant >8 mcg/mL LUCIANO Final    Pip/Tazobactam " Sensitive 16 mcg/mL LUCIANO Final    Trimeth/Sulfa Resistant >2/38 mcg/mL LUCIANO Final    Tigecycline Sensitive <=2 mcg/mL LUCIANO Final                       URINALYSIS [[id number]]  (Abnormal) Collected: 10/17/23 1559    Order Status: Completed Specimen: Urine Updated: 10/17/23 1651     Color DK Yellow     Character Turbid     Specific Gravity 1.019     Ph 6.0     Glucose Negative mg/dL      Ketones Trace mg/dL      Protein 300 mg/dL      Bilirubin Small     Urobilinogen, Urine 1.0     Nitrite Positive     Leukocyte Esterase Large     Occult Blood Large     Micro Urine Req Microscopic    Narrative:      Indication for culture:->Acute unexplained altered mental  status ONLY after ruling out other recognized cause  Release to patient->Immediate            MEDS:  Current Facility-Administered Medications   Medication Last Admin    rivaroxaban (Xarelto) tablet 10 mg 10 mg at 10/23/23 1811    dakins 0.125% (1/4 strength) topical soln      miconazole (Micotin) 2 % cream Given at 10/22/23 1738    minocycline (Minocin) capsule 100 mg 100 mg at 10/24/23 0445    polyethylene glycol/lytes (Miralax) PACKET 1 Packet      And    magnesium hydroxide (Milk Of Magnesia) suspension 30 mL      And    bisacodyl (Dulcolax) suppository 10 mg      labetalol (Normodyne/Trandate) injection 10 mg      acetaminophen (Tylenol) tablet 650 mg 650 mg at 10/18/23 1215    melatonin tablet 3 mg 3 mg at 10/23/23 2112    memantine (Namenda) tablet 5 mg 5 mg at 10/24/23 0445    omeprazole (PriLOSEC) capsule 20 mg 20 mg at 10/24/23 0445    potassium chloride SA (Kdur) tablet 10 mEq 10 mEq at 10/24/23 0445    senna-docusate (Pericolace Or Senokot S) 8.6-50 MG per tablet 1 Tablet 1 Tablet at 10/24/23 0554    tamsulosin (Flomax) capsule 0.4 mg 0.4 mg at 10/23/23 1627       ASSESSMENT/PLAN:  88 y.o. male admitted for AMS, possible UTI and sacral wound. Palliative care met with patient and spoke with grandson, decision made to transition to hospice care.     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  No leukocytosis, patient does not appear septic.  Lactic acid was elevated at 2.5, repeat was 2.7.  He does have indwelling Granger catheter and frequent UTIs.  Last month, patient had Klebsiella ESBL UTI and bacteremia.  Lactic stable, HD stable, no leukocytosis, Urine culture grew klebsiella similar to prior hospitalization. 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  - Receiving prophylactic minocycline, discussion with hospice if should be continued beyond discharge.     Edema  Assessment & Plan  Patient on 10/22 had pitting edema in his lower extremities and some edema in his upper extremities.  Lungs were still clear to auscultation.  - Discontinued maintenance fluids  - Lasix 20 mg p.o. again, will continue to monitor I's and O's and reassess for additional doses     Sacral decubitus ulcer  Assessment & Plan  Patient has history of sacral decubitus ulcer.  He resides in skilled nursing facility. Wound large and deep, although does not have infectious picture at this time  -s/p OR for debridement, recommendations of wound vac and wound care per surgery   - Wound care with wound VAC in place     AMS (altered mental status)  Assessment & Plan  Patient has altered mental status reported at admission, however per chart review patient is often AxOx2 or AxOx1. He is currently not showing signs of infection.  Patient has been a times O x1 since admission without any fluctuations in his mental status.  - Continue to monitor  - Palliative care met with patient and spoke with grandson, who is in contact with other family. Decision made to transition to hospice.     Atrial fibrillation (HCC)- (present on admission)  Assessment & Plan  Patient has history of A-fib, anticoagulated.    - Restart rivaroxaban, which was stopped  during hospitalization  - Telemetry monitoring     Anemia- (present on admission)  Assessment & Plan  Normocytic.  Patient has chronic anemia, currently at his baseline at 10.  Likely anemia of chronic disease or combined picture.  - Iron/TIBC were low     Core Measures:  Fluids: Held  Lines: PIV, indwelling graham  Abx: minocycline PO, mupirocin topical cream  Diet: cardiac  PPX: Xarelto 10 mg daily as prophylaxis    CODE Status: DNAR/DNI      Disposition  Patient is medically cleared for discharge.   Anticipate discharge to to hospice.      Héctor Stinson, MSIII  [employer]

## 2023-10-24 NOTE — DISCHARGE PLANNING
Case Management Discharge Planning    Admission Date: 10/17/2023  GMLOS: 9  ALOS: 7      Anticipated Discharge Dispo: Discharge Disposition: [facility] [address] [facility]    DME Needed: [facility] will provide the DME    Action(s) Taken: Met with Chyna from [facility] and she is working on accepting pt to their hospice service.    Cm called Jocelyn at [facility] at [phone]. Jocelyn is the owner of [facility] and she confirmed that they have talked to [facility] and also are able to accept pt back to the  once hospice is set up. She confirmed that they are not able to accept pt back with a woundvac so she wants the wound to have a regular dressing. Also she wants the graham to remain .     CM left message for grandson to call back to discuss discharge planning and for hospice to get consents.     Escalations Completed: n/a    Medically Clear: yes for hospice care    Next Steps: follow up with [facility]. Update [facility] re: time of discharge.    Barriers to Discharge: pending grandson calling back so that hospice can get hospice consent.     Is the patient up for discharge tomorrow: today.

## 2023-10-24 NOTE — DISCHARGE PLANNING
note:  Jeannie Restrepo from [facility] said she has tried to get [facility] or [facility] to  sooner but they are fully booked.    Notified Jocelyn at [facility]. (LVM)  Notified [facility]. ( Text Message)

## 2023-10-24 NOTE — DISCHARGE PLANNING
DC Transport Scheduled    Received request at: 10/24/2023 at 1206    Transport Company Scheduled:  [facility]  Spoke with Wu at [facility] to schedule transport.    Scheduled Date: 10/24/2023  Scheduled Time: 1730    Destination: [facility] at [address] [facility] [address] [place]     Notified care team of scheduled transport via Voalte.     If there are any changes needed to the DC transportation scheduled, please contact [facility] at ext. [phone] between the hours of 2231-1609 Mon-Fri. If outside those hours, contact the ED Case Manager at ext. [phone].

## 2023-10-24 NOTE — DISCHARGE SUMMARY
"[facility] MEDICINE DISCHARGE SUMMARY     PATIENT ID:  Name:             Trev Carl   YOB: 1935  Age:                 88 y.o.  male   MRN:               [id number]  Address:         [address] [place] [address]  [place] [id number]  Phone:            [phone] (home)    ADMISSION DATE:   10/17/2023    DISCHARGE DATE:   10/24/2023    DISCHARGE DIAGNOSES:   Primary Diagnoses:  Altered mental status-improved to baseline    Secondary Diagnoses:   Colonization with ESBL Klebsiella  Chronic indwelling Granger  Edema  Unstageable sacral decubitus ulcer  A-fib  Chronic anemia    ATTENDING PHYSICIAN:   Traci Mix MD    RESIDENT:   DO Rhianna Reyna DO    CONSULTANTS:    Palliative care-Placed patient on hospice  General surgery-s/p debridement on 10/19    PROCEDURES:    Surgical debridement of unstageable sacral decubitus ulcer on 10/19    IMAGING:   DX-CHEST-PORTABLE (1 VIEW)   Final Result      No radiographic evidence of acute cardiopulmonary disease.          PHYSICAL EXAM:   General: No acute distress, afebrile, resting comfortably, AAOx2/disoriented, short answers to questions, often not understandable (baseline)  HEENT: NC/AT. EOMI.   Cardiovascular: Irregular rhythm, distant heart sounds without murmur. Normal capillary refill   Respiratory: CTAB, no tachypnea or retractions  Abdomen: soft, nontender, nondistended, no masses  EXT:  OLVERA, no edema  Skin: Sacral decubitus wound with wound vac. Wound vac contents are red/brown.   Neuro: Non-focal    LABS:  Recent Labs     10/22/23  0120   WBC 7.9   RBC 2.99*   HEMOGLOBIN 8.5*   HEMATOCRIT 27.2*   MCV 91.0   MCH 28.4   RDW 63.2*   PLATELETCT 56*   MPV 11.5     Recent Labs     10/22/23  0120 10/23/23  0117   SODIUM 138 137   POTASSIUM 3.5* 3.7   CHLORIDE 108 107   CO2 23 22   GLUCOSE 77 93   BUN 19 23*     No results found for: \"CHOLSTRLTOT\", \"LDL\", \"HDL\", \"TRIGLYCERIDE\"    No results found for: \"TROPONINI\", \"CKMB\"  No results " "found for: \"TROPONINI\", \"CKMB\"         HOSPITAL COURSE:   Trev Carl is a 88 y.o. male  who presented with altered mental status on 10/17/23.  He was sent here from skilled nursing facility.  Patient was AAO x1-oriented to self but not place or time, patient is usually not fully oriented at baseline.  In the ER, Patient was hemodynamically stable, A-fib was noted on monitor, HR going in and out of RVR, QTc on EKG was 43.  LVEF in September 2023-60%.   Labs are pertinent for normocytic anemia with hemoglobin of 10.3, ESR 59, GFR 66, BUN 25 creatinine 1.8.  Lactic acid elevated to 2.5, .  WBCs WNL.  UA showed UTI (turbid, 300 protein, positive nitrate, large leukocyte esterase, large occult blood).  Chest x-ray reassuring.  Cefepime started while waiting for cultures.  Patient given IV fluids.  Home Xarelto continued for A-fib.  Patient was full code at this time.  Admitted to telemetry.  Iron studies ordered and showed low levels of iron, although ferritin not ordered.  On 10/18 urine culture grew Klebsiella, similar to prior hospitalization, ID recommended to discontinue cefepime.  Patient continued home chronic minocycline for UTIs.  Repeat lactic acid was 2.7.  CODE STATUS was changed to DNR/DNI.  Patient sacral ulcer was surgically debrided on 10/19.  Nutritionist followed patient in the hospital.  Wound VAC applied to sacral wound on 10/20, wound care continued to follow.  LE swelling noted 10/22, patient given dose of Lasix,   Given again on 10/23.  Palliative care met with patient and spoke with grandson (Mulugeta) on 10/23, decision with family was made to put patient on hospice care.  Patient was discharged at baseline condition to group home with hospice care on 10/24.    DISCHARGE CONDITION:    Stable    DISPOSITION:   SNF on hospice    DISCHARGE MEDICATIONS:      Medication List        START taking these medications        Instructions   acetaminophen 325 MG Tabs  Commonly known as: Tylenol   Take 2 " Tablets by mouth every four hours as needed for Mild Pain.  Dose: 650 mg     atropine 1 % Soln   Administer 2 Drops into both eyes every four hours as needed (for excessive secretions).  Dose: 2 Drop     bisacodyl 10 MG Supp  Commonly known as: Dulcolax   Insert 1 Suppository into the rectum 1 time a day as needed (constipation).  Dose: 10 mg     carboxymethylcellulose 0.5 % Soln  Commonly known as: Refresh Tears   Administer 1 Drop into both eyes as needed (dry eyes).  Dose: 1 Drop     glycopyrrolate 1 MG Tabs  Commonly known as: Robinul   Take 1 Tablet by mouth 3 times a day as needed (oral secretions).  Dose: 1 mg     LORazepam 2 MG/ML Conc  Commonly known as: Ativan   Place 0.5 mL under the tongue every 1 hour as needed (Anxiety/Restlessness) for up to 30 days.  Dose: 1 mg     miconazole 2 % Crea  Commonly known as: Micotin   Apply 1 Application topically 2 times a day.  Dose: 1 Application     morphine 20 MG/ML Soln  Commonly known as: Roxanol   Take 0.5 mL by mouth every 1 hour as needed (Moderate/severe pain) for up to 7 days.  Dose: 10 mg     ondansetron 8 MG Tbdp  Commonly known as: Zofran ODT   Take 1 Tablet by mouth every 8 hours as needed for Nausea.  Dose: 8 mg            CONTINUE taking these medications        Instructions   melatonin 3 MG Tabs   Take 3 mg by mouth at bedtime.  Dose: 3 mg     memantine 5 MG Tabs  Commonly known as: Namenda   Take 5 mg by mouth every day.  Dose: 5 mg     minocycline 100 MG Caps  Commonly known as: Minocin   Take 1 Capsule by mouth every 12 hours.  Dose: 100 mg     nystatin powder  Commonly known as: Mycostatin   Apply 1 Application topically 2 times a day. Apply to groin. 14 day course (10/1/2023 - 10/14/2023).  Dose: 1 Application     omeprazole 20 MG delayed-release capsule  Commonly known as: PriLOSEC   Take 20 mg by mouth every day.  Dose: 20 mg     Os-Mj Calcium + D3 500-5 MG-MCG Tabs  Generic drug: calcium/vitamin D   Take 1 Tablet by mouth every day.  Dose: 1  Tablet     oxybutynin SR 5 MG Tb24  Commonly known as: Ditropan-XL   Take 5 mg by mouth every day.  Dose: 5 mg     potassium chloride SA 10 MEQ Tbcr  Commonly known as: K-Dur   Take 10 mEq by mouth every day.  Dose: 10 mEq     Pro-Stat Liqd   Take 30 mL by mouth every day.  Dose: 30 mL     senna-docusate 8.6-50 MG Tabs  Commonly known as: Pericolace Or Senokot S   Take 1 Tablet by mouth 2 times a day.  Dose: 1 Tablet     tamsulosin 0.4 MG capsule  Commonly known as: Flomax   Take 0.4 mg by mouth every evening.  Dose: 0.4 mg            STOP taking these medications      Xarelto 10 MG Tabs tablet  Generic drug: rivaroxaban                ACTIVITY:   Normal Activity as Tolerated.  If patient remains bedbound, frequent turning.  Nursing staff should be vigilant to patient's safety and comfort.    DIET:   Healthy    DISCHARGE INSTRUCTIONS AND FOLLOW UP:  Patient is medically stable for discharge and will be discharged to group home on hospice.  Patient and family should follow-up with hospice services.  Patient may follow-up with PCP if he desires, if she will visit group home.    **Granger catheter will remain in, routine care and managed at group home  **Wound VAC not compatible with group home/hospice care thus wound VAC will be removed and dressing applied, dressing changes every day with padded dressings.    Follow Up: Hospice    Discharge Instructions:   Patient is now on hospice, family should contact hospice with any concerns.  Do not take patient to ER if he is remaining on hospice.      CC:   DAVID Leslie

## 2023-10-24 NOTE — DISCHARGE PLANNING
Transport set up for 1330 by PAT carr.  Granger will be left in place and a regular dressing is to be on wound, remove wound vac.  Clare Dalal notified by Ling Del Valle RN, CM and he is agreeable to transfer.  Ling Del Valle RN, CM also called [facility], owner of [facility] and [place] at [facility].

## 2023-10-24 NOTE — PROGRESS NOTES
PALLIATIVE CARE SOCIAL WORK FOLLOW UP NOTE    Patient: Trev Carl  Age: 88  Gender: Male  MRN: [id number]  Date Admitted: 10/17/23  Date of Service: 10/24/23    LMSW called patient's grandson/POA. Patient will be discharged to [facility] with [facility]. They sent admission packet and POLST via email for him to sign and return today. LMSW offered assistance with completion, if needed.     Palliative care will sign off now. Please reconsult if additional needs arise.    Fina Becker LMSW  Palliative Care

## 2023-10-24 NOTE — PROGRESS NOTES
Assumed care of patient at bedside report from daytime RN. Updated on POC. Patient currently A & O x 1; on RA; patient is bed bound; without complaints of acute pain. Call light within reach. Whiteboard updated. Fall precautions in place. Bed locked and in lowest position. All questions answered. No other needs indicated at this time.   At time of report patient patient laying in bed. Patient was adjusted and repositioned.

## 2023-10-24 NOTE — CARE PLAN
Problem: Knowledge Deficit - Standard  Goal: Patient and family/care givers will demonstrate understanding of plan of care, disease process/condition, diagnostic tests and medications  Outcome: Met     Problem: Fluid Volume  Goal: Fluid volume balance will be maintained  Outcome: Met     Problem: Mobility  Goal: Patient's capacity to carry out activities will improve  Outcome: Met     Problem: Nutrition  Goal: Patient's nutritional and fluid intake will be adequate or improve  Outcome: Met     Problem: Skin Integrity  Goal: Skin integrity is maintained or improved  Outcome: Met     Problem: Pain - Standard  Goal: Alleviation of pain or a reduction in pain to the patient’s comfort goal  Outcome: Met     Problem: Fall Risk  Goal: Patient will remain free from falls  Outcome: Met   The patient is Stable - Low risk of patient condition declining or worsening    Shift Goals  Clinical Goals: comfort  Patient Goals: comfort  Family Goals: n/a    Progress made toward(s) clinical / shift goals:      Patient is not progressing towards the following goals:

## 2023-10-24 NOTE — PROGRESS NOTES
Transfer orders to [facility] [id number], report called to receiving LUCY Sousa.

## 2023-10-24 NOTE — DISCHARGE PLANNING
note:  Received a message from [facility] stating that they were able to obtain transport for 4-430pm by GMT. Jeannie at [facility] will cancel [facility]. Hospice will reach out to unit SW/CM.

## 2023-10-25 NOTE — DOCUMENTATION QUERY
Replaced by [facility]                                                                       Query Response Note      PATIENT:               TOOTIE DOWNING  ACCT #:                  [id number]  MRN:                     [id number]  :                      1935  ADMIT DATE:       10/17/2023 1:42 PM  DISCH DATE:        10/24/2023 4:33 PM  RESPONDING  PROVIDER #:        [id number]           QUERY TEXT:    Documentation in the wound team assessment indicates that this patient is being treated for ulcers of the following site(s):     Deep tissue pressure ulcers to Right thigh, posterior calf and Left heel     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      The patient's Clinical Indicators include:  - Findings:  wound team noted 10/18 - 10/22:  Pressure injury right thigh and posterior calf and pressure injury left heel, DTI, POA     - Treatments:  wound team assessment, normal saline irrigation, Dakin's solution, hydrocolloid thin, offloading     - Risk factors:  indwelling urinary catheter, edema, anemia, advanced age, UTI, encephalopathy, malnutrition    Thank You,  Charley Wong RN  Clinical Documentation   [email or web address]  Connect via [employer]  Options provided:   -- Pressure injury Right thigh and posterior calf and pressure injury Left heel, DTI present on admission   -- Pressure injury Right thigh and posterior calf and pressure injury Left heel, DTI  not present on admission   -- Wound is not a Pressure Ulcer/DTI, (please specify type of wound)   -- Other explanation, (please specify other explanation)      Query created by: Charley Wong on 10/25/2023 7:07 AM    RESPONSE TEXT:    Pressure injury Right thigh and  posterior calf and pressure injury Left heel, DTI present on admission          Electronically signed by:  FRANCISCO WHEAT MD 10/25/2023 12:24 PM

## 2023-10-25 NOTE — PROGRESS NOTES
Pt DC to group home with medical transportation. Granger in place. Wound vac removed, wet to dry dressing applied. Pt family aware about pt transport.
